# Patient Record
Sex: FEMALE | Race: WHITE | NOT HISPANIC OR LATINO | Employment: OTHER
[De-identification: names, ages, dates, MRNs, and addresses within clinical notes are randomized per-mention and may not be internally consistent; named-entity substitution may affect disease eponyms.]

---

## 2017-08-31 ENCOUNTER — RECORDS - HEALTHEAST (OUTPATIENT)
Dept: ADMINISTRATIVE | Facility: OTHER | Age: 72
End: 2017-08-31

## 2017-09-08 ENCOUNTER — RECORDS - HEALTHEAST (OUTPATIENT)
Dept: ADMINISTRATIVE | Facility: OTHER | Age: 72
End: 2017-09-08

## 2017-10-02 ENCOUNTER — HOSPITAL ENCOUNTER (OUTPATIENT)
Dept: MAMMOGRAPHY | Facility: HOSPITAL | Age: 72
Discharge: HOME | End: 2017-10-02
Attending: INTERNAL MEDICINE

## 2017-10-02 ENCOUNTER — COMMUNICATION - HEALTHEAST (OUTPATIENT)
Dept: TELEHEALTH | Facility: CLINIC | Age: 72
End: 2017-10-02

## 2017-10-02 ENCOUNTER — RECORDS - HEALTHEAST (OUTPATIENT)
Dept: BONE DENSITY | Facility: CLINIC | Age: 72
End: 2017-10-02

## 2017-10-02 ENCOUNTER — RECORDS - HEALTHEAST (OUTPATIENT)
Dept: ADMINISTRATIVE | Facility: OTHER | Age: 72
End: 2017-10-02

## 2017-10-02 DIAGNOSIS — Z78.0 ASYMPTOMATIC MENOPAUSAL STATE: ICD-10-CM

## 2017-10-02 DIAGNOSIS — Z12.31 VISIT FOR SCREENING MAMMOGRAM: ICD-10-CM

## 2017-10-02 DIAGNOSIS — C50.919 MALIGNANT NEOPLASM OF UNSPECIFIED SITE OF UNSPECIFIED FEMALE BREAST (H): ICD-10-CM

## 2018-10-30 ENCOUNTER — HOSPITAL ENCOUNTER (OUTPATIENT)
Dept: MAMMOGRAPHY | Facility: CLINIC | Age: 73
Discharge: HOME | End: 2018-10-30
Attending: INTERNAL MEDICINE

## 2018-10-30 DIAGNOSIS — Z12.31 VISIT FOR SCREENING MAMMOGRAM: ICD-10-CM

## 2019-09-09 ENCOUNTER — COMMUNICATION - HEALTHEAST (OUTPATIENT)
Dept: PHARMACY | Facility: CLINIC | Age: 74
End: 2019-09-09

## 2019-11-05 ENCOUNTER — HOSPITAL ENCOUNTER (OUTPATIENT)
Dept: MAMMOGRAPHY | Facility: CLINIC | Age: 74
Discharge: HOME | End: 2019-11-05
Attending: INTERNAL MEDICINE

## 2019-11-05 ENCOUNTER — COMMUNICATION - HEALTHEAST (OUTPATIENT)
Dept: TELEHEALTH | Facility: CLINIC | Age: 74
End: 2019-11-05

## 2019-11-05 DIAGNOSIS — Z12.31 VISIT FOR SCREENING MAMMOGRAM: ICD-10-CM

## 2020-09-13 ENCOUNTER — COMMUNICATION - HEALTHEAST (OUTPATIENT)
Dept: SCHEDULING | Facility: CLINIC | Age: 75
End: 2020-09-13

## 2020-09-14 ENCOUNTER — SURGERY - HEALTHEAST (OUTPATIENT)
Dept: CARDIOLOGY | Facility: CLINIC | Age: 75
End: 2020-09-14

## 2020-09-14 ASSESSMENT — MIFFLIN-ST. JEOR: SCORE: 1258.81

## 2020-09-15 ENCOUNTER — COMMUNICATION - HEALTHEAST (OUTPATIENT)
Dept: CARDIOLOGY | Facility: CLINIC | Age: 75
End: 2020-09-15

## 2020-09-15 ASSESSMENT — MIFFLIN-ST. JEOR: SCORE: 1260.17

## 2020-09-16 ENCOUNTER — AMBULATORY - HEALTHEAST (OUTPATIENT)
Dept: CARDIOLOGY | Facility: CLINIC | Age: 75
End: 2020-09-16

## 2020-09-16 DIAGNOSIS — I48.0 PAF (PAROXYSMAL ATRIAL FIBRILLATION) (H): ICD-10-CM

## 2020-09-16 ASSESSMENT — MIFFLIN-ST. JEOR: SCORE: 1257.44

## 2020-09-17 LAB
ATRIAL RATE - MUSE: 71 BPM
DIASTOLIC BLOOD PRESSURE - MUSE: NORMAL
INTERPRETATION ECG - MUSE: NORMAL
P AXIS - MUSE: 88 DEGREES
PR INTERVAL - MUSE: 160 MS
QRS DURATION - MUSE: 86 MS
QT - MUSE: 410 MS
QTC - MUSE: 445 MS
R AXIS - MUSE: 69 DEGREES
SYSTOLIC BLOOD PRESSURE - MUSE: NORMAL
T AXIS - MUSE: 45 DEGREES
VENTRICULAR RATE- MUSE: 71 BPM

## 2020-09-21 ENCOUNTER — AMBULATORY - HEALTHEAST (OUTPATIENT)
Dept: CARDIOLOGY | Facility: CLINIC | Age: 75
End: 2020-09-21

## 2020-09-21 DIAGNOSIS — I48.0 PAF (PAROXYSMAL ATRIAL FIBRILLATION) (H): ICD-10-CM

## 2020-09-21 DIAGNOSIS — Z95.0 CARDIAC PACEMAKER IN SITU: ICD-10-CM

## 2020-09-22 ENCOUNTER — OFFICE VISIT - HEALTHEAST (OUTPATIENT)
Dept: CARDIOLOGY | Facility: CLINIC | Age: 75
End: 2020-09-22

## 2020-09-22 DIAGNOSIS — I48.0 PAF (PAROXYSMAL ATRIAL FIBRILLATION) (H): ICD-10-CM

## 2020-09-22 DIAGNOSIS — Z95.0 CARDIAC PACEMAKER IN SITU: ICD-10-CM

## 2020-09-24 LAB
HCC DEVICE COMMENTS: NORMAL
HCC DEVICE IMPLANTING PROVIDER: NORMAL
HCC DEVICE MANUFACTURE: NORMAL
HCC DEVICE MODEL: NORMAL
HCC DEVICE SERIAL NUMBER: NORMAL
HCC DEVICE TYPE: NORMAL

## 2020-10-07 ENCOUNTER — AMBULATORY - HEALTHEAST (OUTPATIENT)
Dept: CARDIOLOGY | Facility: CLINIC | Age: 75
End: 2020-10-07

## 2020-10-07 DIAGNOSIS — I48.0 PAF (PAROXYSMAL ATRIAL FIBRILLATION) (H): ICD-10-CM

## 2020-10-07 DIAGNOSIS — Z95.0 CARDIAC PACEMAKER IN SITU: ICD-10-CM

## 2020-10-08 ENCOUNTER — OFFICE VISIT - HEALTHEAST (OUTPATIENT)
Dept: CARDIOLOGY | Facility: CLINIC | Age: 75
End: 2020-10-08

## 2020-10-08 DIAGNOSIS — I10 BENIGN ESSENTIAL HYPERTENSION: ICD-10-CM

## 2020-10-08 DIAGNOSIS — Z95.0 CARDIAC PACEMAKER IN SITU: ICD-10-CM

## 2020-10-08 DIAGNOSIS — R09.89 LABILE HYPERTENSION: ICD-10-CM

## 2020-10-08 DIAGNOSIS — I48.0 PAF (PAROXYSMAL ATRIAL FIBRILLATION) (H): ICD-10-CM

## 2020-10-08 RX ORDER — SOTALOL HYDROCHLORIDE 80 MG/1
80 TABLET ORAL EVERY 12 HOURS
Qty: 180 TABLET | Refills: 3 | Status: SHIPPED | OUTPATIENT
Start: 2020-10-08 | End: 2021-09-27

## 2020-10-08 RX ORDER — LISINOPRIL 2.5 MG/1
2.5 TABLET ORAL 2 TIMES DAILY
Qty: 180 TABLET | Refills: 3 | Status: SHIPPED | OUTPATIENT
Start: 2020-10-08 | End: 2021-12-23

## 2020-10-21 ENCOUNTER — COMMUNICATION - HEALTHEAST (OUTPATIENT)
Dept: CARDIOLOGY | Facility: CLINIC | Age: 75
End: 2020-10-21

## 2020-10-26 ENCOUNTER — COMMUNICATION - HEALTHEAST (OUTPATIENT)
Dept: CARDIOLOGY | Facility: CLINIC | Age: 75
End: 2020-10-26

## 2020-11-04 ENCOUNTER — COMMUNICATION - HEALTHEAST (OUTPATIENT)
Dept: CARDIOLOGY | Facility: CLINIC | Age: 75
End: 2020-11-04

## 2020-12-15 ENCOUNTER — AMBULATORY - HEALTHEAST (OUTPATIENT)
Dept: CARDIOLOGY | Facility: CLINIC | Age: 75
End: 2020-12-15

## 2020-12-15 DIAGNOSIS — Z95.0 CARDIAC PACEMAKER IN SITU: ICD-10-CM

## 2020-12-15 DIAGNOSIS — I49.5 SICK SINUS SYNDROME (H): ICD-10-CM

## 2020-12-28 ENCOUNTER — COMMUNICATION - HEALTHEAST (OUTPATIENT)
Dept: CARDIOLOGY | Facility: CLINIC | Age: 75
End: 2020-12-28

## 2020-12-28 DIAGNOSIS — E78.5 HYPERLIPIDEMIA: ICD-10-CM

## 2021-03-15 ENCOUNTER — AMBULATORY - HEALTHEAST (OUTPATIENT)
Dept: CARDIOLOGY | Facility: CLINIC | Age: 76
End: 2021-03-15

## 2021-03-15 DIAGNOSIS — I49.5 SICK SINUS SYNDROME (H): ICD-10-CM

## 2021-03-15 DIAGNOSIS — Z95.0 CARDIAC PACEMAKER IN SITU: ICD-10-CM

## 2021-04-19 ENCOUNTER — RECORDS - HEALTHEAST (OUTPATIENT)
Dept: ADMINISTRATIVE | Facility: OTHER | Age: 76
End: 2021-04-19

## 2021-04-22 ENCOUNTER — AMBULATORY - HEALTHEAST (OUTPATIENT)
Dept: CARDIOLOGY | Facility: CLINIC | Age: 76
End: 2021-04-22

## 2021-04-22 DIAGNOSIS — Z95.0 CARDIAC PACEMAKER IN SITU: ICD-10-CM

## 2021-04-22 DIAGNOSIS — I48.0 PAF (PAROXYSMAL ATRIAL FIBRILLATION) (H): ICD-10-CM

## 2021-04-22 DIAGNOSIS — I49.5 SICK SINUS SYNDROME (H): ICD-10-CM

## 2021-04-22 DIAGNOSIS — I10 BENIGN ESSENTIAL HYPERTENSION: ICD-10-CM

## 2021-04-22 ASSESSMENT — MIFFLIN-ST. JEOR: SCORE: 1285.57

## 2021-06-01 ENCOUNTER — RECORDS - HEALTHEAST (OUTPATIENT)
Dept: ADMINISTRATIVE | Facility: CLINIC | Age: 76
End: 2021-06-01

## 2021-06-05 ENCOUNTER — RECORDS - HEALTHEAST (OUTPATIENT)
Dept: SCHEDULING | Facility: CLINIC | Age: 76
End: 2021-06-05

## 2021-06-05 VITALS
HEIGHT: 64 IN | SYSTOLIC BLOOD PRESSURE: 120 MMHG | RESPIRATION RATE: 16 BRPM | HEART RATE: 64 BPM | WEIGHT: 179.8 LBS | DIASTOLIC BLOOD PRESSURE: 82 MMHG | BODY MASS INDEX: 30.7 KG/M2

## 2021-06-05 VITALS — WEIGHT: 174.2 LBS | BODY MASS INDEX: 29.74 KG/M2 | HEIGHT: 64 IN

## 2021-06-05 VITALS
HEART RATE: 81 BPM | RESPIRATION RATE: 18 BRPM | OXYGEN SATURATION: 98 % | BODY MASS INDEX: 30.21 KG/M2 | WEIGHT: 176 LBS | SYSTOLIC BLOOD PRESSURE: 120 MMHG | DIASTOLIC BLOOD PRESSURE: 66 MMHG

## 2021-06-05 VITALS
BODY MASS INDEX: 29.64 KG/M2 | WEIGHT: 173.6 LBS | HEART RATE: 70 BPM | SYSTOLIC BLOOD PRESSURE: 92 MMHG | DIASTOLIC BLOOD PRESSURE: 60 MMHG | HEIGHT: 64 IN | RESPIRATION RATE: 16 BRPM

## 2021-06-08 ENCOUNTER — HOSPITAL ENCOUNTER (OUTPATIENT)
Dept: MAMMOGRAPHY | Facility: CLINIC | Age: 76
Discharge: HOME | End: 2021-06-08
Attending: INTERNAL MEDICINE

## 2021-06-08 DIAGNOSIS — Z12.31 VISIT FOR SCREENING MAMMOGRAM: ICD-10-CM

## 2021-06-11 NOTE — PROGRESS NOTES
Pt comes in to clinic for ekg evaluation after starting Sotalol 80 mg bid during her admission to [facility].    EKG shows paced rhythm @ 71,  QTc 445.    Pt states she is tolerating this medication well.  She is feeling well, no dizziness or lightheadedness, no shortness of breath or chest pain.  She has a few medication questions and these are all answered.  She is scheduled for a follow up with the Device clinic on 9-22-20.  Reviewed contact information.

## 2021-06-11 NOTE — PROGRESS NOTES
DEVICE CLINIC RN POST-OP NOTE    Reason for visit: 1 week post op pacemaker wound and wound check.      Device: Medtronic [id number] [place] XT DR MRI  Procedure date: 9/14/2020  Implant Diagnosis: Sick sinus syndrome, Paroxysmal atrial fibrillation  Implanting Physician: Dr. Cl Massey      Assessment  Subjective: Pt is using tylenol and ice for pain relief. Pain is more prevalent at night.     Incision/device pocket: The steri-strips were removed from the incision. The incision is clean, dry and intact. There are no signs of infection present. The incision is well healed.  There is moderate ecchymosis that is resolving.     Device Findings  Interrogation of device reveals normal sensing and capture thresholds  See the Paceart Report for a full summary of the device information.        Patient Education    [facility]'s Partnership Agreement for Device Patients and Post-operative Checklist were presented and reviewed with patient at today's appointment.    Signs and symptoms of infection, poor wound healing, and device function were reviewed. Range of motion and weight restrictions for the RIGHT side are for four weeks. She was issued a Carelink remote monitor and instructed on its set-up and use for remote monitoring by the [employer] representative prior to hospital discharge. These instructions were reviewed with the patient at today s visit.       Plan  In clinic 1 month on 10/8/2020 in tandem with Mario Alberto Styles RN CNP.    In clinic device check in 3 months on 12/15/2020        Jolanta De La Cruz RN BSN PHN  Device Nurse   [facility]

## 2021-06-11 NOTE — PATIENT INSTRUCTIONS - HE
Pacemaker Post-operative Checklist      The Device Registered Nurse (RN) reviewed the pacemaker function.      The Device RN did a wound assessment and wound care teaching.    Please call the Device Nurses with any signs of infection or questions regarding wound healing. Device Nurse Line: [phone], Option #3      The Device RN demonstrated and displayed the specific remote monitoring system for your pacemaker.      The Device RN reviewed the Partnership Agreement Form.    Patient Instructions    Do not lift your right arm above the shoulder height, perform any vigorous arm movements such as swimming, golfing, washing windows, shoveling show, vacuuming or lifting greater than 10-15lbs with the affected arm for four weeks from the date of implant.  The last day of restrictions is 10/12/2020.    To reduce the risk of infection, try to avoid any dental procedures for the first 6 weeks after your pacemaker implant. If you have an emergent or urgent dental need during this time, contact the device clinic for a prescription for an antibiotic.       You will receive a device identification (ID) card in the mail from the device  within 6 weeks to replace the temporary ID card you were given in the hospital.      You may travel by any mode of transportation; just show your pacemaker ID card. You may be subject to a hand search or use of a handheld wand, but official should not keep the wand over the implant site for greater than 5-10 seconds.      For any surgery, let your doctor know you have a pacemaker.       Your pacemaker is MRI safe       Most household appliances, including a microwave, will not interfere with your pacemaker function. If you suspect interference, simply move away from the source. Cell phones do not cause a problem. Please refer to the device booklet from the  or their website under the section on electromagnetic interference (GREG) for further guidelines on things that may  interfere with your pacemaker.       Device Clinic Contact Information  Device Nurses: [phone], Option #3    Device Remote Specialists: [phone], Option #2. For questions about your Remote Transmission or Transmission Schedule  Device Schedulers: [phone], Option #1

## 2021-06-11 NOTE — TELEPHONE ENCOUNTER
Dr. Massey called EP RN line after implanting pts dual PPM.  Pt was started on Sotolol yesterday, per DND she will need EKG scheduled for tomorrow to check QTc.    Dr. Massey also requests that pt be scheduled to see Mario Alberto Styles NP in Device Clinic in 3 weeks for post op.    Will forward over to Katie to get these appointments scheduled.    Katie- let me know if you have any questions! DND said he ordered the EKG already.    Thank you!    Erika

## 2021-06-12 NOTE — PROGRESS NOTES
Review of systems done with patient over the phone. positive for daytime sleepiness and dizziness. Her medications were wrong and she changed them back and she feels better.

## 2021-06-12 NOTE — PATIENT INSTRUCTIONS - HE
Abiola Hallman,    It was a pleasure to see you today at the [facility].     My recommendations after this visit include:    Please call if symptoms of atrial fibrillation frequent.      To followup with Dr. Massey in 6 months with device check before visit..      My contact information:  Mario Alberto Styles CNP  After Hours or Scheduling  [phone]  My Nurse---Cary Zhang [phone]

## 2021-06-12 NOTE — TELEPHONE ENCOUNTER
Abiola takes hydrochlorothiazide in the morning.  She is convinced that this is making her get up frequently at night to go the bathroom.  Bladder infection has been ruled out in primary clinic.  I discussed that hctz does not work that long.  There is no other medication that is likely causing urinary frequency at night and with bladder infection ruled out, she will do a trial off of hydrochlorothiazide to see if this makes a difference.  To call me next week after she is off the hydrochlorothiazide for about a week and let me know if this has resolved nocturia.  She should take her blood pressure once or twice a day every other day and to report readings when she calls in as she may need increase in lisinopril dose with if she stays off of hydrochlorothiazide.  She has been known to have labile blood pressure in the past.  She is agreeable to this plan.

## 2021-06-12 NOTE — TELEPHONE ENCOUNTER
"    Patient called with complaints of frequent urination through the night. States she is up about 10 times a night and only getting 2-3 hours of sleep, wonders if the hydrochlorothiazide is causing this. States she spoke with Mario Alberto HAJI CNP last week about medications changes, now would like to talk to her  about this medication. States she takes it in the morning and limits her fluid intake after 5 pm, yet is still \"up all night.\"    Marivel Bautista RN    "

## 2021-06-12 NOTE — TELEPHONE ENCOUNTER
"Patient called back today, states she did go see PMD, they took a urine sample and \"ruled out a bladder infection so it is not that.\" States she is still convinced this is medication related and would like to speak to Mario Alberto HAJI CNP about her urinary frequency at night and medications.     Marivel Bautista RN    "

## 2021-06-12 NOTE — TELEPHONE ENCOUNTER
"Ms. Hallman is calling to speak with Mario Alberto Styles RN, CNP regarding how she is feeling \"after a medication change last week.\"  Ms. Styles's nurse is with another patient; therefore, I took a message and Cary Zhang LPN will call Ms. Hallman back.  I thanked her for her call. Routed to Cary Zhang for follow-up.  Oliva Llanos RN, MA  Device Nurse  [facility]    "

## 2021-06-16 PROBLEM — Z95.0 CARDIAC PACEMAKER IN SITU: Status: ACTIVE | Noted: 2020-09-21

## 2021-06-16 PROBLEM — I49.5 SICK SINUS SYNDROME (H): Status: ACTIVE | Noted: 2020-09-15

## 2021-06-16 PROBLEM — G45.8 ACUTE ANTERIOR CIRCULATION TIA: Chronic | Status: ACTIVE | Noted: 2019-09-05

## 2021-06-16 PROBLEM — I10 BENIGN ESSENTIAL HYPERTENSION: Chronic | Status: ACTIVE | Noted: 2019-09-06

## 2021-06-16 PROBLEM — T50.905A: Chronic | Status: ACTIVE | Noted: 2019-09-08

## 2021-06-16 NOTE — PATIENT INSTRUCTIONS - HE
Abiola Hallman    It was a pleasure to see you today for a clinic visit.    You are doing well without further atrial fibrillation  Follow up appointment: Mario Alberto Styles CNP in 1 year    Contact EP Nurse Clinicians at [phone] with questions or concerns.    Cl Massey MD

## 2021-06-17 NOTE — TELEPHONE ENCOUNTER
Telephone Encounter by Marivel Bautista RN at 10/21/2020  1:02 PM     Author: Marivel Bautista RN Service: -- Author Type: Registered Nurse    Filed: 10/21/2020  1:10 PM Encounter Date: 10/21/2020 Status: Signed    : Marivel Bautista RN (Registered Nurse)       Meryl Styles CNP Gebel, Mandy L, RN   Caller: Unspecified (Today, 12:23 PM)             To call primary care provider as needs to have bladder infection ruled out.  Not caused by hydrochlorothiazide.   Mario Alberto      Above noted and reviewed with patient, agrees to see PMD.   Marivel Bautista RN

## 2021-06-17 NOTE — PROGRESS NOTES
In clinic device check with Dr. Massey.  Please see link for full device report.  Patient was informed of results and next follow up during today's visit.

## 2021-06-20 NOTE — LETTER
Letter by Elba Osborn RDCS at      Author: Elba Osborn RDCS Service: -- Author Type: --    Filed:  Encounter Date: 9/21/2020 Status: (Other)         Abiola Hallman  [address] [place] [address]      September 21, 2020      Dear Ms. Hallman,    RE: Remote Results    We are writing to you regarding your recent Remote Pacemaker check from home. Your transmission was received successfully. Battery status is satisfactory at this time.     Your results are showing no significant changes.    Your next device appointment will be a clinic visit on October 8, 2020 at 8:20 at our  [place] location, [address] [place] [address].    To schedule or reschedule, please call [phone] and press 1.    NOTE: If you would like to do an extra transmission, please call [phone] and press 3 to speak to a nurse BEFORE transmitting. This ensures that the Device Clinic staff is aware of the reason you are sending a transmission, and can follow-up with you after it has been reviewed.    We will be checking your implanted device from home (remotely) every three months unless otherwise instructed. We will need to see you in the clinic at least once a year. You may need to be seen in the clinic sooner depending on the results of your check.    Please be aware:    The follow-up schedule is like a Physician prescription.    Your remote monitor is paired to your specific implanted device.      Sincerely,    [facility]

## 2021-06-20 NOTE — LETTER
Letter by Adeline Rodriguez at      Author: Adeline Rodriguez Service: -- Author Type: --    Filed:  Encounter Date: 10/7/2020 Status: (Other)         Abiola Hallman  [address] [place] [address]      October 7, 2020      Dear Ms. Hallman,    RE: Remote Results    We are writing to you regarding your recent Remote Pacemaker check from home. Your transmission was received successfully. Battery status is satisfactory at this time.     Your results are within normal limits.    Your next device appointment will be a clinic visit on December 15, 2020 at 9:20am at our  [facility] location, [address] [place] [address].    To schedule or reschedule, please call [phone] and press 1.    NOTE: If you would like to do an extra transmission, please call [phone] and press 3 to speak to a nurse BEFORE transmitting. This ensures that the Device Clinic staff is aware of the reason you are sending a transmission, and can follow-up with you after it has been reviewed.    We will be checking your implanted device from home (remotely) every three months unless otherwise instructed. We will need to see you in the clinic at least once a year. You may need to be seen in the clinic sooner depending on the results of your check.    Please be aware:    The follow-up schedule is like a Physician prescription.    Your remote monitor is paired to your specific implanted device.      Sincerely,    [facility] Clinic

## 2021-06-23 ENCOUNTER — COMMUNICATION - HEALTHEAST (OUTPATIENT)
Dept: CARDIOLOGY | Facility: CLINIC | Age: 76
End: 2021-06-23

## 2021-06-26 NOTE — TELEPHONE ENCOUNTER
"Mario Alberto-  Pt is reaching out wondering and wondering if it was ok to take Prednisone PO and polymyxin sulfate eye drops for 7-10 days s/p eye procedure she had  \"I take a lot of medication and am very concerned it is ok to take with all my other medications\"  Please advise  Thank you,  So  "

## 2021-06-26 NOTE — TELEPHONE ENCOUNTER
Attempted to contact pt, detailed message discussing the below with contact information was left per pt request.  6/24/2021 9:31 AM  Little Medrano RN

## 2021-06-29 NOTE — PROGRESS NOTES
"Progress Notes by Meryl Styles CNP at 10/8/2020  9:10 AM     Author: Meryl Styles CNP Service: -- Author Type: Nurse Practitioner    Filed: 10/8/2020 12:17 PM Encounter Date: 10/8/2020 Status: Signed    : Meryl Styles CNP (Nurse Practitioner)            The patient has been notified of following:     \"This telephone visit will be conducted via a call between you and your physician/provider. We have found that certain health care needs can be provided without the need for a physical exam.  This service lets us provide the care you need with a phone conversation.  If a prescription is necessary we can send it directly to your pharmacy.  If lab work is needed we can place an order for that and you can then stop by our lab to have the test done at a later time. If during the course of the call the physician/provider feels a telephone visit is not appropriate, you will not be charged for this service.\"         HEART CARE PHONE ENCOUNTER        Appointment is being conducted as a telephone visit, to reduce risk of exposure given the current status of Coronovirus in our community. This telephone visit is being conducted via a call between the patient and physician/provider. Health care needs are being provided without a physical exam.     Assessment/Recommendations   Assessment & PLAN:     Diagnoses and all orders for this visit:    PAF (paroxysmal atrial fibrillation) (H) and unclear if she is symptomatic with atrial fib.  No side effects on sotalol.  Therefore I will continue antiarrhythmic for now.  -     sotaloL (BETAPACE) 80 MG tablet; Take 1 tablet (80 mg total) by mouth every 12 (twelve) hours.  Dispense: 180 tablet; Refill: 3    Labile hypertension  -     Discontinue: lisinopriL (PRINIVIL,ZESTRIL) 2.5 MG tablet; Take 1 tablet (2.5 mg total) by mouth 2 (two) times a day.  Dispense: 180 tablet; Refill: 3  -     lisinopriL (PRINIVIL,ZESTRIL) 2.5 MG tablet; Take 1 tablet (2.5 mg total) " by mouth 2 (two) times a day.  Dispense: 180 tablet; Refill: 3    Cardiac pacemaker in situ and device functioning appropriately.    Benign essential hypertension and no BP for evaluation due to phone visit.  BP meds refilled.  To continue lisinopril 2.5 mg p.o. twice daily.        [id number] score of 6 and on Eliquis which is affordable for her.  Discussed that it will go generic within the next year.  Follow up in clinic with Dr. Massey in 6 months with device check prior to visit.      Total time of call between patient and provider was 22 minutes .  Start [id number] and end time 0942.       History of Present Illness/Subjective    Abiola Hallman is a 74 y.o. female who is being evaluated via a billable telephone visit.    Abiola has a long history of sinus bradycardia and sick sinus syndrome and sinus node dysfunction and got a permanent pacemaker on 9/14/2020 with Dr. Massey.  She denies any complications post pacemaker.  She feels better since her permanent pacemaker but reports that she had significant fatigue for the first 2 weeks until she switched taking her lisinopril from 5 mg in the morning to 2.5 mg p.o. twice daily.  Overnight fatigue resolved.  I discussed that this is not a usual side effect of lisinopril as a good 24-hour medication.  Abiola's medical history is also significant for hypertension, paroxysmal atrial fib and breast cancer with left mastectomy.  The device was inserted on the right side.  She reports that it is well-healed.  Her medical history is also significant for a TIA and  had expressive aphasia for about 10 minutes about a year ago.  It is unclear to me if she knows when she is in atrial fib.  She has had no significant episodes since permanent pacemaker placement.  She denies any cardiac symptoms on questioning.  She is quite talkative.    I have reviewed and updated the patient's Past Medical History, Social History, Family History and Medication List.   Cardiographics reviewed and  prep for this visit are as follows:  Remote device check from yesterday shows 90% atrial pacing and less than 1% RV pacing.  No arrhythmias seen.  Leads stable and battery okay.  Physical Examination not perform given phone encounter Review of Systems                                                Medical History  Surgical History Family History Social History   Past Medical History:   Diagnosis Date   ? Breast cancer (H) 2007   ? Hypertension    ? Stroke (H)     Past Surgical History:   Procedure Laterality Date   ? ABDOMINAL SURGERY     ? BACK SURGERY     ? BREAST BIOPSY Right 2008 2011, 2012   ? BREAST BIOPSY Left 2007   ? BREAST LUMPECTOMY Left 2007   ? EP PACEMAKER INSERT N/A 9/14/2020    Procedure: EP Pacemaker Insertion;  Surgeon: Cl Massey MD;  Location: [facility] Lab;  Service: Cardiology   ? MASTECTOMY Left 2007    Family History   Problem Relation Age of Onset   ? Breast cancer Mother 80    Social History     Socioeconomic History   ? Marital status:      Spouse name: Not on file   ? Number of children: Not on file   ? Years of education: Not on file   ? Highest education level: Not on file   Occupational History   ? Not on file   Social Needs   ? Financial resource strain: Not on file   ? Food insecurity     Worry: Not on file     Inability: Not on file   ? Transportation needs     Medical: Not on file     Non-medical: Not on file   Tobacco Use   ? Smoking status: Never Smoker   ? Smokeless tobacco: Never Used   Substance and Sexual Activity   ? Alcohol use: Never     Frequency: Never   ? Drug use: Never   ? Sexual activity: Not on file   Lifestyle   ? Physical activity     Days per week: Not on file     Minutes per session: Not on file   ? Stress: Not on file   Relationships   ? Social connections     Talks on phone: Not on file     Gets together: Not on file     Attends Rastafarian service: Not on file     Active member of club or organization: Not on file     Attends meetings of  clubs or organizations: Not on file     Relationship status: Not on file   ? Intimate partner violence     Fear of current or ex partner: Not on file     Emotionally abused: Not on file     Physically abused: Not on file     Forced sexual activity: Not on file   Other Topics Concern   ? Not on file   Social History Narrative   ? Not on file          Medications  Allergies   Current Outpatient Medications   Medication Sig Dispense Refill   ? acetaminophen (TYLENOL) 325 MG tablet Take 1 tablet (325 mg total) by mouth every 4 (four) hours as needed.  0   ? apixaban ANTICOAGULANT (ELIQUIS) 5 mg Tab tablet Take 5 mg by mouth 2 (two) times a day.     ? aspirin 81 MG EC tablet Take 1 tablet (81 mg total) by mouth daily.  0   ? atorvastatin (LIPITOR) 10 MG tablet Take 10 mg by mouth at bedtime.     ? famotidine (PEPCID) 20 MG tablet Take 1 tablet (20 mg total) by mouth daily. (for stomach acid)  0   ? hydroCHLOROthiazide (MICROZIDE) 12.5 mg capsule Take 1 capsule (12.5 mg total) by mouth daily. (mild water pill for blood pressure) 30 capsule 0   ? lisinopriL (PRINIVIL,ZESTRIL) 2.5 MG tablet Take 1 tablet (2.5 mg total) by mouth 2 (two) times a day. 180 tablet 3   ? multivitamin Chew Chew 2 tablets daily.      ? sertraline (ZOLOFT) 25 MG tablet Take 25 mg by mouth daily.     ? sotaloL (BETAPACE) 80 MG tablet Take 1 tablet (80 mg total) by mouth every 12 (twelve) hours. 180 tablet 3     No current facility-administered medications for this visit.     Allergies   Allergen Reactions   ? Essential Oils Shortness Of Breath, Cough and Headache   ? Hydralazine Nausea And Vomiting and Headache     Possibly. 12 hrs of Nausea and headache after one 10 mg dose   ? Adhesive Tape-Silicones      Bandaids   ? Codeine    ? Darvon [Propoxyphene]    ? Latex    ? Monistat 1 (Tioconazole) [Tioconazole]    ? Neurontin [Gabapentin]    ? Oxycodone    ? Paxil [Paroxetine Hcl]    ? Vicodin [Hydrocodone-Acetaminophen]    ? Citalopram Anxiety   ?  Paroxetine Anxiety   ? Sertraline Anxiety     States the generic causes anxiety, is able to take Zoloft band name         Lab Results    Chemistry/lipid CBC Cardiac Enzymes/BNP/TSH/INR   Lab Results   Component Value Date    CHOL 171 09/06/2019    HDL 45 (L) 09/06/2019    LDLCALC 96 09/06/2019    TRIG 149 09/06/2019    CREATININE 0.84 09/12/2020    BUN 13 09/12/2020    K 3.7 09/12/2020     09/12/2020     09/12/2020    CO2 26 09/12/2020    Lab Results   Component Value Date    WBC 8.6 09/12/2020    HGB 12.5 09/12/2020    HCT 37.4 09/12/2020    MCV 90 09/12/2020     09/12/2020    Lab Results   Component Value Date    TROPONINI <0.01 09/11/2020    TSH 2.72 09/10/2020    INR 1.05 09/10/2020        Meryl Styles

## 2021-06-30 NOTE — PROGRESS NOTES
Progress Notes by Cl Massey MD at 4/22/2021  2:50 PM     Author: Cl Massey MD Service: -- Author Type: Physician    Filed: 4/22/2021  3:16 PM Encounter Date: 4/22/2021 Status: Signed    : Cl Massey MD (Physician)                ELECTROPHYSIOLOGY NOTE    Today I had the opportunity to see  Abiola Hallman for follow-up evaluation of tachycardia-bradycardia syndrome.      Assessment/Recommendations   Clinic Problem List:  No diagnosis found.    30 minutes spent on this encounter date for chart review, history, physical exam,documentation and activities detailed below.    Assessment:    Paroxysmal atrial fibrillation, completely suppressed with sotalol 80 mg twice daily, would favor continuing Eliquis however with past history of TIA and The patient's [id number] Score for stroke risk is 6  Sick sinus syndrome, normally functioning dual-chamber pacemaker.    Plan:   Continue current medications  Follow up appointment: Mario Alberto Styles CNP in 1 year short     History of Present Illness     Abiola Hallman is a 75 y.o. female presented with atrial fibrillation with rapid ventricular response on 9/10/2020.  She was started on metoprolol 25 mg daily and several days later returned with symptomatic bradycardia with heart rates of 40 bpm. A dual-chamber permanent pacemaker was placed 9/14/2020.  She was subsequently started on sotalol and remained on Eliquis.  Twelve-lead ECG 9/16/2020 showed an atrial paced rhythm rate 70 bpm intact AV conduction and normal QT interval.  Postoperative device checks in October December and March all showed no evidence for atrial fibrillation..  She has felt quite well since permanent pacemaker placement.  She denies palpitations rapid heart beating or excessive bleeding on Eliquis.  She has resumed her activity without limitations  Personally reviewed.  Pacemaker check today shows that she is 90% atrial paced with seldom ventricularly paced rhythm.  There is been no  atrial fibrillation.  Atrial and ventricular pacing and sensing thresholds are excellent.  Estimated battery longevity is 13 years.       Physical Examination Review of Systems   Vitals:    04/22/21 1416   BP: 120/82   Pulse: 64   Resp: 16     Body mass index is 30.86 kg/m .  Wt Readings from Last 3 Encounters:   04/22/21 179 lb 12.8 oz (81.6 kg)   12/15/20 176 lb (79.8 kg)   09/16/20 173 lb 9.6 oz (78.7 kg)        Appearance:   no distress,    HEENT:   no scleral icterus, normal conjunctivae    Neck: no carotid bruits or thyromegaly   Chest/Lungs:   lungs are clear to auscultation, no rales or wheezing, permanent pacemaker well-healed in the left subclavian pocket   Cardiovascular:   Jugular venous pressure 4 cm, Apical pulse is quite regular. Normal S1,S2 with no murmurs or gallops,   Abdomen:  no  Hepatosplenomegaly., nontender,  bowel sounds are present   Extremities: no cyanosis or clubbing, No edema   Skin: no xanthelasma, warm.    Neurologic: No gross focal neurologic deficits   Mood/Affect: Alert, cooperative    General: WNL  Eyes: WNL  Ears/Nose/Throat: WNL  Lungs: WNL  Heart: WNL  Stomach: WNL  Bladder: WNL  Muscle/Joints: WNL  Skin: WNL  Nervous System: WNL  Mental Health: WNL     Blood: WNL     Medical History  Surgical History Family History Social History   Past Medical History:   Diagnosis Date   ? Breast cancer (H) 2007   ? Hypertension    ? Stroke (H)     Past Surgical History:   Procedure Laterality Date   ? ABDOMINAL SURGERY     ? BACK SURGERY     ? BREAST BIOPSY Right 2008 2011, 2012   ? BREAST BIOPSY Left 2007   ? BREAST LUMPECTOMY Left 2007   ? EP PACEMAKER INSERT N/A 9/14/2020    Procedure: EP Pacemaker Insertion;  Surgeon: Cl Massey MD;  Location: [facility] Cath Lab;  Service: Cardiology   ? MASTECTOMY Left 2007    Family History   Problem Relation Age of Onset   ? Breast cancer Mother 80    Social History     Socioeconomic History   ? Marital status:      Spouse name: Not  on file   ? Number of children: Not on file   ? Years of education: Not on file   ? Highest education level: Not on file   Occupational History   ? Not on file   Social Needs   ? Financial resource strain: Not on file   ? Food insecurity     Worry: Not on file     Inability: Not on file   ? Transportation needs     Medical: Not on file     Non-medical: Not on file   Tobacco Use   ? Smoking status: Never Smoker   ? Smokeless tobacco: Never Used   Substance and Sexual Activity   ? Alcohol use: Never     Frequency: Never   ? Drug use: Never   ? Sexual activity: Not on file   Lifestyle   ? Physical activity     Days per week: Not on file     Minutes per session: Not on file   ? Stress: Not on file   Relationships   ? Social connections     Talks on phone: Not on file     Gets together: Not on file     Attends Yazidism service: Not on file     Active member of club or organization: Not on file     Attends meetings of clubs or organizations: Not on file     Relationship status: Not on file   ? Intimate partner violence     Fear of current or ex partner: Not on file     Emotionally abused: Not on file     Physically abused: Not on file     Forced sexual activity: Not on file   Other Topics Concern   ? Not on file   Social History Narrative   ? Not on file          Medications  Allergies   Current Outpatient Medications   Medication Sig Dispense Refill   ? acetaminophen (TYLENOL) 325 MG tablet Take 1 tablet (325 mg total) by mouth every 4 (four) hours as needed.  0   ? apixaban ANTICOAGULANT (ELIQUIS) 5 mg Tab tablet Take 1 tablet (5 mg total) by mouth 2 (two) times a day. 180 tablet 1   ? aspirin 81 MG EC tablet Take 1 tablet (81 mg total) by mouth daily.  0   ? atorvastatin (LIPITOR) 10 MG tablet Take 10 mg by mouth at bedtime.     ? famotidine (PEPCID) 20 MG tablet Take 1 tablet (20 mg total) by mouth daily. (for stomach acid)  0   ? lisinopriL (PRINIVIL,ZESTRIL) 2.5 MG tablet Take 1 tablet (2.5 mg total) by mouth 2  (two) times a day. 180 tablet 3   ? multivitamin Chew Chew 2 tablets daily.      ? sertraline (ZOLOFT) 25 MG tablet Take 25 mg by mouth daily.     ? sotaloL (BETAPACE) 80 MG tablet Take 1 tablet (80 mg total) by mouth every 12 (twelve) hours. 180 tablet 3     No current facility-administered medications for this visit.       Allergies   Allergen Reactions   ? Essential Oils Shortness Of Breath, Cough and Headache   ? Hydralazine Nausea And Vomiting and Headache     Possibly. 12 hrs of Nausea and headache after one 10 mg dose   ? Adhesive Tape-Silicones      Bandaids   ? Codeine    ? Darvon [Propoxyphene]    ? Latex    ? Monistat 1 (Tioconazole) [Tioconazole]    ? Neurontin [Gabapentin]    ? Oxycodone    ? Paxil [Paroxetine Hcl]    ? Vicodin [Hydrocodone-Acetaminophen]    ? Citalopram Anxiety   ? Paroxetine Anxiety

## 2021-07-03 NOTE — ADDENDUM NOTE
Addendum Note by Mreyl Vazquez CNP at 10/26/2020  4:14 PM     Author: Meryl Vazquez CNP Service: -- Author Type: Nurse Practitioner    Filed: 10/26/2020  4:14 PM Encounter Date: 10/21/2020 Status: Signed    : Meryl Vazquez CNP (Nurse Practitioner)    Addended by: MERYL VAZQUEZ on: 10/26/2020 04:14 PM        Modules accepted: Orders

## 2021-07-04 ENCOUNTER — HEALTH MAINTENANCE LETTER (OUTPATIENT)
Age: 76
End: 2021-07-04

## 2021-07-04 NOTE — TELEPHONE ENCOUNTER
Telephone Encounter by Little Medrano RN at 6/24/2021  8:00 AM     Author: Little Medrano RN Service: -- Author Type: Registered Nurse    Filed: 6/24/2021  8:01 AM Encounter Date: 6/23/2021 Status: Signed    : Little Medrano RN (Registered Nurse)       Attempted to contact pt X2 , unable to leave voicemail.   Phone picks up and then hangs up immediately.  6/24/2021 8:00 AM  CORA Malone Janet A., Little Pinedo RN   Caller: Unspecified (Yesterday,  1:37 PM)             Yes -no problem.   Mario Alberto

## 2021-07-14 PROBLEM — R00.1 SYMPTOMATIC BRADYCARDIA: Status: RESOLVED | Noted: 2020-09-12 | Resolved: 2020-10-08

## 2021-07-14 PROBLEM — R00.1 BRADYCARDIA: Status: RESOLVED | Noted: 2020-09-12 | Resolved: 2020-10-08

## 2021-07-14 PROBLEM — I49.5 SINUS NODE DYSFUNCTION (H): Status: RESOLVED | Noted: 2020-09-15 | Resolved: 2020-10-08

## 2021-07-23 ENCOUNTER — ANCILLARY PROCEDURE (OUTPATIENT)
Dept: CARDIOLOGY | Facility: CLINIC | Age: 76
End: 2021-07-23
Attending: INTERNAL MEDICINE
Payer: COMMERCIAL

## 2021-07-23 DIAGNOSIS — Z95.0 CARDIAC PACEMAKER IN SITU: ICD-10-CM

## 2021-07-23 LAB
MDC_IDC_EPISODE_DTM: NORMAL
MDC_IDC_EPISODE_DURATION: 0 S
MDC_IDC_EPISODE_ID: 1
MDC_IDC_EPISODE_TYPE: NORMAL
MDC_IDC_LEAD_IMPLANT_DT: NORMAL
MDC_IDC_LEAD_IMPLANT_DT: NORMAL
MDC_IDC_LEAD_LOCATION: NORMAL
MDC_IDC_LEAD_LOCATION: NORMAL
MDC_IDC_LEAD_LOCATION_DETAIL_1: NORMAL
MDC_IDC_LEAD_LOCATION_DETAIL_1: NORMAL
MDC_IDC_LEAD_MFG: NORMAL
MDC_IDC_LEAD_MFG: NORMAL
MDC_IDC_LEAD_MODEL: NORMAL
MDC_IDC_LEAD_MODEL: NORMAL
MDC_IDC_LEAD_POLARITY_TYPE: NORMAL
MDC_IDC_LEAD_POLARITY_TYPE: NORMAL
MDC_IDC_LEAD_SERIAL: NORMAL
MDC_IDC_LEAD_SERIAL: NORMAL
MDC_IDC_LEAD_SPECIAL_FUNCTION: NORMAL
MDC_IDC_LEAD_SPECIAL_FUNCTION: NORMAL
MDC_IDC_MSMT_BATTERY_DTM: NORMAL
MDC_IDC_MSMT_BATTERY_REMAINING_LONGEVITY: 154 MO
MDC_IDC_MSMT_BATTERY_RRT_TRIGGER: 2.62
MDC_IDC_MSMT_BATTERY_STATUS: NORMAL
MDC_IDC_MSMT_BATTERY_VOLTAGE: 3.09 V
MDC_IDC_MSMT_LEADCHNL_RA_IMPEDANCE_VALUE: 342 OHM
MDC_IDC_MSMT_LEADCHNL_RA_IMPEDANCE_VALUE: 513 OHM
MDC_IDC_MSMT_LEADCHNL_RA_PACING_THRESHOLD_AMPLITUDE: 0.62 V
MDC_IDC_MSMT_LEADCHNL_RA_PACING_THRESHOLD_PULSEWIDTH: 0.4 MS
MDC_IDC_MSMT_LEADCHNL_RA_SENSING_INTR_AMPL: 2.75 MV
MDC_IDC_MSMT_LEADCHNL_RA_SENSING_INTR_AMPL: 2.75 MV
MDC_IDC_MSMT_LEADCHNL_RV_IMPEDANCE_VALUE: 437 OHM
MDC_IDC_MSMT_LEADCHNL_RV_IMPEDANCE_VALUE: 589 OHM
MDC_IDC_MSMT_LEADCHNL_RV_PACING_THRESHOLD_AMPLITUDE: 0.62 V
MDC_IDC_MSMT_LEADCHNL_RV_PACING_THRESHOLD_PULSEWIDTH: 0.4 MS
MDC_IDC_MSMT_LEADCHNL_RV_SENSING_INTR_AMPL: 6.88 MV
MDC_IDC_MSMT_LEADCHNL_RV_SENSING_INTR_AMPL: 6.88 MV
MDC_IDC_PG_IMPLANT_DTM: NORMAL
MDC_IDC_PG_MFG: NORMAL
MDC_IDC_PG_MODEL: NORMAL
MDC_IDC_PG_SERIAL: NORMAL
MDC_IDC_PG_TYPE: NORMAL
MDC_IDC_SESS_CLINIC_NAME: NORMAL
MDC_IDC_SESS_DTM: NORMAL
MDC_IDC_SESS_TYPE: NORMAL
MDC_IDC_SET_BRADY_AT_MODE_SWITCH_RATE: 171 {BEATS}/MIN
MDC_IDC_SET_BRADY_HYSTRATE: NORMAL
MDC_IDC_SET_BRADY_LOWRATE: 70 {BEATS}/MIN
MDC_IDC_SET_BRADY_MAX_SENSOR_RATE: 130 {BEATS}/MIN
MDC_IDC_SET_BRADY_MAX_TRACKING_RATE: 130 {BEATS}/MIN
MDC_IDC_SET_BRADY_MODE: NORMAL
MDC_IDC_SET_BRADY_PAV_DELAY_LOW: 180 MS
MDC_IDC_SET_BRADY_SAV_DELAY_LOW: 150 MS
MDC_IDC_SET_LEADCHNL_RA_PACING_AMPLITUDE: 1.5 V
MDC_IDC_SET_LEADCHNL_RA_PACING_ANODE_ELECTRODE_1: NORMAL
MDC_IDC_SET_LEADCHNL_RA_PACING_ANODE_LOCATION_1: NORMAL
MDC_IDC_SET_LEADCHNL_RA_PACING_CAPTURE_MODE: NORMAL
MDC_IDC_SET_LEADCHNL_RA_PACING_CATHODE_ELECTRODE_1: NORMAL
MDC_IDC_SET_LEADCHNL_RA_PACING_CATHODE_LOCATION_1: NORMAL
MDC_IDC_SET_LEADCHNL_RA_PACING_POLARITY: NORMAL
MDC_IDC_SET_LEADCHNL_RA_PACING_PULSEWIDTH: 0.4 MS
MDC_IDC_SET_LEADCHNL_RA_SENSING_ANODE_ELECTRODE_1: NORMAL
MDC_IDC_SET_LEADCHNL_RA_SENSING_ANODE_LOCATION_1: NORMAL
MDC_IDC_SET_LEADCHNL_RA_SENSING_CATHODE_ELECTRODE_1: NORMAL
MDC_IDC_SET_LEADCHNL_RA_SENSING_CATHODE_LOCATION_1: NORMAL
MDC_IDC_SET_LEADCHNL_RA_SENSING_POLARITY: NORMAL
MDC_IDC_SET_LEADCHNL_RA_SENSING_SENSITIVITY: 0.45 MV
MDC_IDC_SET_LEADCHNL_RV_PACING_AMPLITUDE: 1.5 V
MDC_IDC_SET_LEADCHNL_RV_PACING_ANODE_ELECTRODE_1: NORMAL
MDC_IDC_SET_LEADCHNL_RV_PACING_ANODE_LOCATION_1: NORMAL
MDC_IDC_SET_LEADCHNL_RV_PACING_CAPTURE_MODE: NORMAL
MDC_IDC_SET_LEADCHNL_RV_PACING_CATHODE_ELECTRODE_1: NORMAL
MDC_IDC_SET_LEADCHNL_RV_PACING_CATHODE_LOCATION_1: NORMAL
MDC_IDC_SET_LEADCHNL_RV_PACING_POLARITY: NORMAL
MDC_IDC_SET_LEADCHNL_RV_PACING_PULSEWIDTH: 0.4 MS
MDC_IDC_SET_LEADCHNL_RV_SENSING_ANODE_ELECTRODE_1: NORMAL
MDC_IDC_SET_LEADCHNL_RV_SENSING_ANODE_LOCATION_1: NORMAL
MDC_IDC_SET_LEADCHNL_RV_SENSING_CATHODE_ELECTRODE_1: NORMAL
MDC_IDC_SET_LEADCHNL_RV_SENSING_CATHODE_LOCATION_1: NORMAL
MDC_IDC_SET_LEADCHNL_RV_SENSING_POLARITY: NORMAL
MDC_IDC_SET_LEADCHNL_RV_SENSING_SENSITIVITY: 0.9 MV
MDC_IDC_SET_ZONE_DETECTION_INTERVAL: 200 MS
MDC_IDC_SET_ZONE_DETECTION_INTERVAL: 350 MS
MDC_IDC_SET_ZONE_DETECTION_INTERVAL: 400 MS
MDC_IDC_SET_ZONE_TYPE: NORMAL
MDC_IDC_STAT_BRADY_AP_VP_PERCENT: 0.02 %
MDC_IDC_STAT_BRADY_AP_VS_PERCENT: 90.64 %
MDC_IDC_STAT_BRADY_AS_VP_PERCENT: 0 %
MDC_IDC_STAT_BRADY_AS_VS_PERCENT: 9.34 %
MDC_IDC_STAT_BRADY_DTM_END: NORMAL
MDC_IDC_STAT_BRADY_DTM_START: NORMAL
MDC_IDC_STAT_BRADY_RA_PERCENT_PACED: 89.63 %
MDC_IDC_STAT_BRADY_RV_PERCENT_PACED: 0.03 %
MDC_IDC_STAT_EPISODE_RECENT_COUNT: 0
MDC_IDC_STAT_EPISODE_RECENT_COUNT: 0
MDC_IDC_STAT_EPISODE_RECENT_COUNT: 1
MDC_IDC_STAT_EPISODE_RECENT_COUNT_DTM_END: NORMAL
MDC_IDC_STAT_EPISODE_RECENT_COUNT_DTM_START: NORMAL
MDC_IDC_STAT_EPISODE_TOTAL_COUNT: 0
MDC_IDC_STAT_EPISODE_TOTAL_COUNT: 0
MDC_IDC_STAT_EPISODE_TOTAL_COUNT: 1
MDC_IDC_STAT_EPISODE_TOTAL_COUNT_DTM_END: NORMAL
MDC_IDC_STAT_EPISODE_TOTAL_COUNT_DTM_START: NORMAL
MDC_IDC_STAT_EPISODE_TYPE: NORMAL

## 2021-07-23 PROCEDURE — 93294 REM INTERROG EVL PM/LDLS PM: CPT | Performed by: INTERNAL MEDICINE

## 2021-07-23 PROCEDURE — 93296 REM INTERROG EVL PM/IDS: CPT | Performed by: INTERNAL MEDICINE

## 2021-09-09 DIAGNOSIS — E78.5 HYPERLIPIDEMIA: Primary | ICD-10-CM

## 2021-09-09 RX ORDER — ATORVASTATIN CALCIUM 10 MG/1
10 TABLET, FILM COATED ORAL AT BEDTIME
Qty: 90 TABLET | Refills: 1 | Status: SHIPPED | OUTPATIENT
Start: 2021-09-09 | End: 2021-12-07

## 2021-09-27 DIAGNOSIS — I48.0 PAF (PAROXYSMAL ATRIAL FIBRILLATION) (H): ICD-10-CM

## 2021-09-27 RX ORDER — SOTALOL HYDROCHLORIDE 80 MG/1
TABLET ORAL
Qty: 180 TABLET | Refills: 0 | Status: SHIPPED | OUTPATIENT
Start: 2021-09-27 | End: 2021-12-23

## 2021-10-22 ENCOUNTER — ANCILLARY PROCEDURE (OUTPATIENT)
Dept: CARDIOLOGY | Facility: CLINIC | Age: 76
End: 2021-10-22
Attending: INTERNAL MEDICINE
Payer: COMMERCIAL

## 2021-10-22 DIAGNOSIS — I49.5 SSS (SICK SINUS SYNDROME) (H): ICD-10-CM

## 2021-10-22 DIAGNOSIS — Z95.0 PACEMAKER: ICD-10-CM

## 2021-10-22 PROCEDURE — 93296 REM INTERROG EVL PM/IDS: CPT | Performed by: INTERNAL MEDICINE

## 2021-10-22 PROCEDURE — 93294 REM INTERROG EVL PM/LDLS PM: CPT | Performed by: INTERNAL MEDICINE

## 2021-10-24 ENCOUNTER — HEALTH MAINTENANCE LETTER (OUTPATIENT)
Age: 76
End: 2021-10-24

## 2021-11-01 LAB
MDC_IDC_LEAD_IMPLANT_DT: NORMAL
MDC_IDC_LEAD_IMPLANT_DT: NORMAL
MDC_IDC_LEAD_LOCATION: NORMAL
MDC_IDC_LEAD_LOCATION: NORMAL
MDC_IDC_LEAD_LOCATION_DETAIL_1: NORMAL
MDC_IDC_LEAD_LOCATION_DETAIL_1: NORMAL
MDC_IDC_LEAD_MFG: NORMAL
MDC_IDC_LEAD_MFG: NORMAL
MDC_IDC_LEAD_MODEL: NORMAL
MDC_IDC_LEAD_MODEL: NORMAL
MDC_IDC_LEAD_POLARITY_TYPE: NORMAL
MDC_IDC_LEAD_POLARITY_TYPE: NORMAL
MDC_IDC_LEAD_SERIAL: NORMAL
MDC_IDC_LEAD_SERIAL: NORMAL
MDC_IDC_LEAD_SPECIAL_FUNCTION: NORMAL
MDC_IDC_LEAD_SPECIAL_FUNCTION: NORMAL
MDC_IDC_MSMT_BATTERY_DTM: NORMAL
MDC_IDC_MSMT_BATTERY_REMAINING_LONGEVITY: 152 MO
MDC_IDC_MSMT_BATTERY_RRT_TRIGGER: 2.62
MDC_IDC_MSMT_BATTERY_STATUS: NORMAL
MDC_IDC_MSMT_BATTERY_VOLTAGE: 3.06 V
MDC_IDC_MSMT_LEADCHNL_RA_IMPEDANCE_VALUE: 342 OHM
MDC_IDC_MSMT_LEADCHNL_RA_IMPEDANCE_VALUE: 551 OHM
MDC_IDC_MSMT_LEADCHNL_RA_PACING_THRESHOLD_AMPLITUDE: 0.62 V
MDC_IDC_MSMT_LEADCHNL_RA_PACING_THRESHOLD_PULSEWIDTH: 0.4 MS
MDC_IDC_MSMT_LEADCHNL_RA_SENSING_INTR_AMPL: 2.75 MV
MDC_IDC_MSMT_LEADCHNL_RA_SENSING_INTR_AMPL: 2.75 MV
MDC_IDC_MSMT_LEADCHNL_RV_IMPEDANCE_VALUE: 399 OHM
MDC_IDC_MSMT_LEADCHNL_RV_IMPEDANCE_VALUE: 494 OHM
MDC_IDC_MSMT_LEADCHNL_RV_PACING_THRESHOLD_AMPLITUDE: 0.62 V
MDC_IDC_MSMT_LEADCHNL_RV_PACING_THRESHOLD_PULSEWIDTH: 0.4 MS
MDC_IDC_MSMT_LEADCHNL_RV_SENSING_INTR_AMPL: 6.38 MV
MDC_IDC_MSMT_LEADCHNL_RV_SENSING_INTR_AMPL: 6.38 MV
MDC_IDC_PG_IMPLANT_DTM: NORMAL
MDC_IDC_PG_MFG: NORMAL
MDC_IDC_PG_MODEL: NORMAL
MDC_IDC_PG_SERIAL: NORMAL
MDC_IDC_PG_TYPE: NORMAL
MDC_IDC_SESS_CLINIC_NAME: NORMAL
MDC_IDC_SESS_DTM: NORMAL
MDC_IDC_SESS_TYPE: NORMAL
MDC_IDC_SET_BRADY_AT_MODE_SWITCH_RATE: 171 {BEATS}/MIN
MDC_IDC_SET_BRADY_HYSTRATE: NORMAL
MDC_IDC_SET_BRADY_LOWRATE: 70 {BEATS}/MIN
MDC_IDC_SET_BRADY_MAX_SENSOR_RATE: 130 {BEATS}/MIN
MDC_IDC_SET_BRADY_MAX_TRACKING_RATE: 130 {BEATS}/MIN
MDC_IDC_SET_BRADY_MODE: NORMAL
MDC_IDC_SET_BRADY_PAV_DELAY_LOW: 180 MS
MDC_IDC_SET_BRADY_SAV_DELAY_LOW: 150 MS
MDC_IDC_SET_LEADCHNL_RA_PACING_AMPLITUDE: 1.5 V
MDC_IDC_SET_LEADCHNL_RA_PACING_ANODE_ELECTRODE_1: NORMAL
MDC_IDC_SET_LEADCHNL_RA_PACING_ANODE_LOCATION_1: NORMAL
MDC_IDC_SET_LEADCHNL_RA_PACING_CAPTURE_MODE: NORMAL
MDC_IDC_SET_LEADCHNL_RA_PACING_CATHODE_ELECTRODE_1: NORMAL
MDC_IDC_SET_LEADCHNL_RA_PACING_CATHODE_LOCATION_1: NORMAL
MDC_IDC_SET_LEADCHNL_RA_PACING_POLARITY: NORMAL
MDC_IDC_SET_LEADCHNL_RA_PACING_PULSEWIDTH: 0.4 MS
MDC_IDC_SET_LEADCHNL_RA_SENSING_ANODE_ELECTRODE_1: NORMAL
MDC_IDC_SET_LEADCHNL_RA_SENSING_ANODE_LOCATION_1: NORMAL
MDC_IDC_SET_LEADCHNL_RA_SENSING_CATHODE_ELECTRODE_1: NORMAL
MDC_IDC_SET_LEADCHNL_RA_SENSING_CATHODE_LOCATION_1: NORMAL
MDC_IDC_SET_LEADCHNL_RA_SENSING_POLARITY: NORMAL
MDC_IDC_SET_LEADCHNL_RA_SENSING_SENSITIVITY: 0.45 MV
MDC_IDC_SET_LEADCHNL_RV_PACING_AMPLITUDE: 1.5 V
MDC_IDC_SET_LEADCHNL_RV_PACING_ANODE_ELECTRODE_1: NORMAL
MDC_IDC_SET_LEADCHNL_RV_PACING_ANODE_LOCATION_1: NORMAL
MDC_IDC_SET_LEADCHNL_RV_PACING_CAPTURE_MODE: NORMAL
MDC_IDC_SET_LEADCHNL_RV_PACING_CATHODE_ELECTRODE_1: NORMAL
MDC_IDC_SET_LEADCHNL_RV_PACING_CATHODE_LOCATION_1: NORMAL
MDC_IDC_SET_LEADCHNL_RV_PACING_POLARITY: NORMAL
MDC_IDC_SET_LEADCHNL_RV_PACING_PULSEWIDTH: 0.4 MS
MDC_IDC_SET_LEADCHNL_RV_SENSING_ANODE_ELECTRODE_1: NORMAL
MDC_IDC_SET_LEADCHNL_RV_SENSING_ANODE_LOCATION_1: NORMAL
MDC_IDC_SET_LEADCHNL_RV_SENSING_CATHODE_ELECTRODE_1: NORMAL
MDC_IDC_SET_LEADCHNL_RV_SENSING_CATHODE_LOCATION_1: NORMAL
MDC_IDC_SET_LEADCHNL_RV_SENSING_POLARITY: NORMAL
MDC_IDC_SET_LEADCHNL_RV_SENSING_SENSITIVITY: 0.9 MV
MDC_IDC_SET_ZONE_DETECTION_INTERVAL: 200 MS
MDC_IDC_SET_ZONE_DETECTION_INTERVAL: 350 MS
MDC_IDC_SET_ZONE_DETECTION_INTERVAL: 400 MS
MDC_IDC_SET_ZONE_TYPE: NORMAL
MDC_IDC_STAT_AT_BURDEN_PERCENT: 0 %
MDC_IDC_STAT_AT_DTM_END: NORMAL
MDC_IDC_STAT_AT_DTM_START: NORMAL
MDC_IDC_STAT_BRADY_AP_VP_PERCENT: 0.02 %
MDC_IDC_STAT_BRADY_AP_VS_PERCENT: 88.32 %
MDC_IDC_STAT_BRADY_AS_VP_PERCENT: 0 %
MDC_IDC_STAT_BRADY_AS_VS_PERCENT: 11.65 %
MDC_IDC_STAT_BRADY_DTM_END: NORMAL
MDC_IDC_STAT_BRADY_DTM_START: NORMAL
MDC_IDC_STAT_BRADY_RA_PERCENT_PACED: 87.07 %
MDC_IDC_STAT_BRADY_RV_PERCENT_PACED: 0.03 %
MDC_IDC_STAT_EPISODE_RECENT_COUNT: 0
MDC_IDC_STAT_EPISODE_RECENT_COUNT_DTM_END: NORMAL
MDC_IDC_STAT_EPISODE_RECENT_COUNT_DTM_START: NORMAL
MDC_IDC_STAT_EPISODE_TOTAL_COUNT: 0
MDC_IDC_STAT_EPISODE_TOTAL_COUNT: 1
MDC_IDC_STAT_EPISODE_TOTAL_COUNT_DTM_END: NORMAL
MDC_IDC_STAT_EPISODE_TOTAL_COUNT_DTM_START: NORMAL
MDC_IDC_STAT_EPISODE_TYPE: NORMAL

## 2021-11-02 NOTE — TELEPHONE ENCOUNTER
Pt was called back for Mario Ablerto see note from 10/21 from Mario Alberto  Pt is off hydrochlorothiazide and checking BP and HR morning and zi  11/2 /75 HR 91 zi 120/76 HR 95  11/3 /75 HR 81 zi  132/83 HR 89  11/4 /79 HR 82  Since off the hydrochlorothiazide only up once at night to void and this makes her very happy and is feeling good  Reviewed with Mario Alberto and no changes are being made pt is to call if any changes  Pt agrees to plan and has my direct number for contact    Detail Level: Generalized Instructions: This plan will send the code FBSE to the PM system.  DO NOT or CHANGE the price. Price (Do Not Change): 0.00 Xeljanz Counseling: I discussed with the patient the risks of Xeljanz therapy including increased risk of infection, liver issues, headache, diarrhea, or cold symptoms. Live vaccines should be avoided. They were instructed to call if they have any problems.

## 2021-12-07 DIAGNOSIS — E78.5 HYPERLIPIDEMIA: ICD-10-CM

## 2021-12-07 RX ORDER — ATORVASTATIN CALCIUM 10 MG/1
10 TABLET, FILM COATED ORAL AT BEDTIME
Qty: 90 TABLET | Refills: 2 | Status: SHIPPED | OUTPATIENT
Start: 2021-12-07 | End: 2022-02-28
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
## 2022-01-13 ENCOUNTER — OFFICE VISIT (OUTPATIENT)
Dept: INTERNAL MEDICINE | Facility: CLINIC | Age: 77
End: 2022-01-13
Payer: COMMERCIAL

## 2022-01-13 VITALS
BODY MASS INDEX: 31.41 KG/M2 | HEIGHT: 64 IN | WEIGHT: 184 LBS | HEART RATE: 73 BPM | DIASTOLIC BLOOD PRESSURE: 78 MMHG | OXYGEN SATURATION: 98 % | SYSTOLIC BLOOD PRESSURE: 122 MMHG

## 2022-01-13 DIAGNOSIS — Z95.0 CARDIAC PACEMAKER IN SITU: ICD-10-CM

## 2022-01-13 DIAGNOSIS — M41.9 SCOLIOSIS OF LUMBOSACRAL SPINE, UNSPECIFIED SCOLIOSIS TYPE: ICD-10-CM

## 2022-01-13 DIAGNOSIS — I48.0 PAF (PAROXYSMAL ATRIAL FIBRILLATION) (H): ICD-10-CM

## 2022-01-13 DIAGNOSIS — Z85.3 PERSONAL HISTORY OF MALIGNANT NEOPLASM OF BREAST: ICD-10-CM

## 2022-01-13 DIAGNOSIS — I10 ESSENTIAL HYPERTENSION, BENIGN: ICD-10-CM

## 2022-01-13 DIAGNOSIS — G25.0 BENIGN ESSENTIAL TREMOR: ICD-10-CM

## 2022-01-13 DIAGNOSIS — E78.2 MIXED DYSLIPIDEMIA: Primary | ICD-10-CM

## 2022-01-13 PROBLEM — F32.4 MAJOR DEPRESSIVE DISORDER IN PARTIAL REMISSION (H): Status: ACTIVE | Noted: 2022-01-13

## 2022-01-13 LAB
ALBUMIN SERPL-MCNC: 4 G/DL (ref 3.5–5)
ALP SERPL-CCNC: 68 U/L (ref 45–120)
ALT SERPL W P-5'-P-CCNC: 21 U/L (ref 0–45)
ANION GAP SERPL CALCULATED.3IONS-SCNC: 10 MMOL/L (ref 5–18)
AST SERPL W P-5'-P-CCNC: 27 U/L (ref 0–40)
BILIRUB SERPL-MCNC: 0.5 MG/DL (ref 0–1)
BUN SERPL-MCNC: 17 MG/DL (ref 8–28)
CALCIUM SERPL-MCNC: 9.7 MG/DL (ref 8.5–10.5)
CHLORIDE BLD-SCNC: 101 MMOL/L (ref 98–107)
CO2 SERPL-SCNC: 25 MMOL/L (ref 22–31)
CREAT SERPL-MCNC: 0.86 MG/DL (ref 0.6–1.1)
ERYTHROCYTE [DISTWIDTH] IN BLOOD BY AUTOMATED COUNT: 12.7 % (ref 10–15)
GFR SERPL CREATININE-BSD FRML MDRD: 69 ML/MIN/1.73M2
GLUCOSE BLD-MCNC: 87 MG/DL (ref 70–125)
HCT VFR BLD AUTO: 43.5 % (ref 35–47)
HGB BLD-MCNC: 13.9 G/DL (ref 11.7–15.7)
MCH RBC QN AUTO: 29.7 PG (ref 26.5–33)
MCHC RBC AUTO-ENTMCNC: 32 G/DL (ref 31.5–36.5)
MCV RBC AUTO: 93 FL (ref 78–100)
PLATELET # BLD AUTO: 277 10E3/UL (ref 150–450)
POTASSIUM BLD-SCNC: 4.8 MMOL/L (ref 3.5–5)
PROT SERPL-MCNC: 6.6 G/DL (ref 6–8)
RBC # BLD AUTO: 4.68 10E6/UL (ref 3.8–5.2)
SODIUM SERPL-SCNC: 136 MMOL/L (ref 136–145)
TSH SERPL DL<=0.005 MIU/L-ACNC: 1.09 UIU/ML (ref 0.3–5)
WBC # BLD AUTO: 7.6 10E3/UL (ref 4–11)

## 2022-01-13 PROCEDURE — 80053 COMPREHEN METABOLIC PANEL: CPT | Performed by: INTERNAL MEDICINE

## 2022-01-13 PROCEDURE — 36415 COLL VENOUS BLD VENIPUNCTURE: CPT | Performed by: INTERNAL MEDICINE

## 2022-01-13 PROCEDURE — 99204 OFFICE O/P NEW MOD 45 MIN: CPT | Performed by: INTERNAL MEDICINE

## 2022-01-13 PROCEDURE — 84443 ASSAY THYROID STIM HORMONE: CPT | Performed by: INTERNAL MEDICINE

## 2022-01-13 PROCEDURE — 85027 COMPLETE CBC AUTOMATED: CPT | Performed by: INTERNAL MEDICINE

## 2022-01-13 ASSESSMENT — MIFFLIN-ST. JEOR: SCORE: 1299.62
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
## 2022-02-17 ENCOUNTER — OFFICE VISIT (OUTPATIENT)
Dept: INTERNAL MEDICINE | Facility: CLINIC | Age: 77
End: 2022-02-17
Payer: COMMERCIAL

## 2022-02-17 VITALS
DIASTOLIC BLOOD PRESSURE: 86 MMHG | OXYGEN SATURATION: 99 % | WEIGHT: 185.3 LBS | BODY MASS INDEX: 31.81 KG/M2 | HEART RATE: 79 BPM | SYSTOLIC BLOOD PRESSURE: 120 MMHG | TEMPERATURE: 98.2 F

## 2022-02-17 DIAGNOSIS — F32.4 MAJOR DEPRESSIVE DISORDER IN PARTIAL REMISSION, UNSPECIFIED WHETHER RECURRENT (H): ICD-10-CM

## 2022-02-17 DIAGNOSIS — H25.013 CORTICAL AGE-RELATED CATARACT OF BOTH EYES: ICD-10-CM

## 2022-02-17 DIAGNOSIS — Z01.818 PREOP GENERAL PHYSICAL EXAM: Primary | ICD-10-CM

## 2022-02-17 PROCEDURE — 99214 OFFICE O/P EST MOD 30 MIN: CPT | Performed by: INTERNAL MEDICINE

## 2022-02-17 ASSESSMENT — ANXIETY QUESTIONNAIRES
2. NOT BEING ABLE TO STOP OR CONTROL WORRYING: SEVERAL DAYS
1. FEELING NERVOUS, ANXIOUS, OR ON EDGE: SEVERAL DAYS
5. BEING SO RESTLESS THAT IT IS HARD TO SIT STILL: NOT AT ALL
6. BECOMING EASILY ANNOYED OR IRRITABLE: SEVERAL DAYS
GAD7 TOTAL SCORE: 5
IF YOU CHECKED OFF ANY PROBLEMS ON THIS QUESTIONNAIRE, HOW DIFFICULT HAVE THESE PROBLEMS MADE IT FOR YOU TO DO YOUR WORK, TAKE CARE OF THINGS AT HOME, OR GET ALONG WITH OTHER PEOPLE: SOMEWHAT DIFFICULT
3. WORRYING TOO MUCH ABOUT DIFFERENT THINGS: SEVERAL DAYS
7. FEELING AFRAID AS IF SOMETHING AWFUL MIGHT HAPPEN: NOT AT ALL

## 2022-02-17 ASSESSMENT — PATIENT HEALTH QUESTIONNAIRE - PHQ9
SUM OF ALL RESPONSES TO PHQ QUESTIONS 1-9: 6
5. POOR APPETITE OR OVEREATING: SEVERAL DAYS
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
## 2022-02-18 ASSESSMENT — ANXIETY QUESTIONNAIRES: GAD7 TOTAL SCORE: 5

## 2022-02-22 ENCOUNTER — ANCILLARY PROCEDURE (OUTPATIENT)
Dept: CARDIOLOGY | Facility: CLINIC | Age: 77
End: 2022-02-22
Attending: INTERNAL MEDICINE
Payer: COMMERCIAL

## 2022-02-22 DIAGNOSIS — Z95.0 PACEMAKER: ICD-10-CM

## 2022-02-22 DIAGNOSIS — I49.5 SSS (SICK SINUS SYNDROME) (H): ICD-10-CM

## 2022-02-22 LAB
MDC_IDC_EPISODE_DTM: NORMAL
MDC_IDC_EPISODE_DURATION: 5964 S
MDC_IDC_EPISODE_ID: 2
MDC_IDC_EPISODE_TYPE: NORMAL
MDC_IDC_LEAD_IMPLANT_DT: NORMAL
MDC_IDC_LEAD_IMPLANT_DT: NORMAL
MDC_IDC_LEAD_LOCATION: NORMAL
MDC_IDC_LEAD_LOCATION: NORMAL
MDC_IDC_LEAD_LOCATION_DETAIL_1: NORMAL
MDC_IDC_LEAD_LOCATION_DETAIL_1: NORMAL
MDC_IDC_LEAD_MFG: NORMAL
MDC_IDC_LEAD_MFG: NORMAL
MDC_IDC_LEAD_MODEL: NORMAL
MDC_IDC_LEAD_MODEL: NORMAL
MDC_IDC_LEAD_POLARITY_TYPE: NORMAL
MDC_IDC_LEAD_POLARITY_TYPE: NORMAL
MDC_IDC_LEAD_SERIAL: NORMAL
MDC_IDC_LEAD_SERIAL: NORMAL
MDC_IDC_LEAD_SPECIAL_FUNCTION: NORMAL
MDC_IDC_LEAD_SPECIAL_FUNCTION: NORMAL
MDC_IDC_MSMT_BATTERY_DTM: NORMAL
MDC_IDC_MSMT_BATTERY_REMAINING_LONGEVITY: 147 MO
MDC_IDC_MSMT_BATTERY_RRT_TRIGGER: 2.62
MDC_IDC_MSMT_BATTERY_STATUS: NORMAL
MDC_IDC_MSMT_BATTERY_VOLTAGE: 3.04 V
MDC_IDC_MSMT_LEADCHNL_RA_IMPEDANCE_VALUE: 361 OHM
MDC_IDC_MSMT_LEADCHNL_RA_IMPEDANCE_VALUE: 513 OHM
MDC_IDC_MSMT_LEADCHNL_RA_PACING_THRESHOLD_AMPLITUDE: 0.5 V
MDC_IDC_MSMT_LEADCHNL_RA_PACING_THRESHOLD_PULSEWIDTH: 0.4 MS
MDC_IDC_MSMT_LEADCHNL_RA_SENSING_INTR_AMPL: 2.75 MV
MDC_IDC_MSMT_LEADCHNL_RA_SENSING_INTR_AMPL: 2.75 MV
MDC_IDC_MSMT_LEADCHNL_RV_IMPEDANCE_VALUE: 399 OHM
MDC_IDC_MSMT_LEADCHNL_RV_IMPEDANCE_VALUE: 551 OHM
MDC_IDC_MSMT_LEADCHNL_RV_PACING_THRESHOLD_AMPLITUDE: 0.62 V
MDC_IDC_MSMT_LEADCHNL_RV_PACING_THRESHOLD_PULSEWIDTH: 0.4 MS
MDC_IDC_MSMT_LEADCHNL_RV_SENSING_INTR_AMPL: 5.75 MV
MDC_IDC_MSMT_LEADCHNL_RV_SENSING_INTR_AMPL: 5.75 MV
MDC_IDC_PG_IMPLANT_DTM: NORMAL
MDC_IDC_PG_MFG: NORMAL
MDC_IDC_PG_MODEL: NORMAL
MDC_IDC_PG_SERIAL: NORMAL
MDC_IDC_PG_TYPE: NORMAL
MDC_IDC_SESS_CLINIC_NAME: NORMAL
MDC_IDC_SESS_DTM: NORMAL
MDC_IDC_SESS_TYPE: NORMAL
MDC_IDC_SET_BRADY_AT_MODE_SWITCH_RATE: 171 {BEATS}/MIN
MDC_IDC_SET_BRADY_HYSTRATE: NORMAL
MDC_IDC_SET_BRADY_LOWRATE: 70 {BEATS}/MIN
MDC_IDC_SET_BRADY_MAX_SENSOR_RATE: 130 {BEATS}/MIN
MDC_IDC_SET_BRADY_MAX_TRACKING_RATE: 130 {BEATS}/MIN
MDC_IDC_SET_BRADY_MODE: NORMAL
MDC_IDC_SET_BRADY_PAV_DELAY_LOW: 180 MS
MDC_IDC_SET_BRADY_SAV_DELAY_LOW: 150 MS
MDC_IDC_SET_LEADCHNL_RA_PACING_AMPLITUDE: 1.5 V
MDC_IDC_SET_LEADCHNL_RA_PACING_ANODE_ELECTRODE_1: NORMAL
MDC_IDC_SET_LEADCHNL_RA_PACING_ANODE_LOCATION_1: NORMAL
MDC_IDC_SET_LEADCHNL_RA_PACING_CAPTURE_MODE: NORMAL
MDC_IDC_SET_LEADCHNL_RA_PACING_CATHODE_ELECTRODE_1: NORMAL
MDC_IDC_SET_LEADCHNL_RA_PACING_CATHODE_LOCATION_1: NORMAL
MDC_IDC_SET_LEADCHNL_RA_PACING_POLARITY: NORMAL
MDC_IDC_SET_LEADCHNL_RA_PACING_PULSEWIDTH: 0.4 MS
MDC_IDC_SET_LEADCHNL_RA_SENSING_ANODE_ELECTRODE_1: NORMAL
MDC_IDC_SET_LEADCHNL_RA_SENSING_ANODE_LOCATION_1: NORMAL
MDC_IDC_SET_LEADCHNL_RA_SENSING_CATHODE_ELECTRODE_1: NORMAL
MDC_IDC_SET_LEADCHNL_RA_SENSING_CATHODE_LOCATION_1: NORMAL
MDC_IDC_SET_LEADCHNL_RA_SENSING_POLARITY: NORMAL
MDC_IDC_SET_LEADCHNL_RA_SENSING_SENSITIVITY: 0.45 MV
MDC_IDC_SET_LEADCHNL_RV_PACING_AMPLITUDE: 1.5 V
MDC_IDC_SET_LEADCHNL_RV_PACING_ANODE_ELECTRODE_1: NORMAL
MDC_IDC_SET_LEADCHNL_RV_PACING_ANODE_LOCATION_1: NORMAL
MDC_IDC_SET_LEADCHNL_RV_PACING_CAPTURE_MODE: NORMAL
MDC_IDC_SET_LEADCHNL_RV_PACING_CATHODE_ELECTRODE_1: NORMAL
MDC_IDC_SET_LEADCHNL_RV_PACING_CATHODE_LOCATION_1: NORMAL
MDC_IDC_SET_LEADCHNL_RV_PACING_POLARITY: NORMAL
MDC_IDC_SET_LEADCHNL_RV_PACING_PULSEWIDTH: 0.4 MS
MDC_IDC_SET_LEADCHNL_RV_SENSING_ANODE_ELECTRODE_1: NORMAL
MDC_IDC_SET_LEADCHNL_RV_SENSING_ANODE_LOCATION_1: NORMAL
MDC_IDC_SET_LEADCHNL_RV_SENSING_CATHODE_ELECTRODE_1: NORMAL
MDC_IDC_SET_LEADCHNL_RV_SENSING_CATHODE_LOCATION_1: NORMAL
MDC_IDC_SET_LEADCHNL_RV_SENSING_POLARITY: NORMAL
MDC_IDC_SET_LEADCHNL_RV_SENSING_SENSITIVITY: 0.9 MV
MDC_IDC_SET_ZONE_DETECTION_INTERVAL: 200 MS
MDC_IDC_SET_ZONE_DETECTION_INTERVAL: 350 MS
MDC_IDC_SET_ZONE_DETECTION_INTERVAL: 400 MS
MDC_IDC_SET_ZONE_TYPE: NORMAL
MDC_IDC_STAT_AT_BURDEN_PERCENT: 0.1 %
MDC_IDC_STAT_AT_DTM_END: NORMAL
MDC_IDC_STAT_AT_DTM_START: NORMAL
MDC_IDC_STAT_BRADY_AP_VP_PERCENT: 0.02 %
MDC_IDC_STAT_BRADY_AP_VS_PERCENT: 85.38 %
MDC_IDC_STAT_BRADY_AS_VP_PERCENT: 0.01 %
MDC_IDC_STAT_BRADY_AS_VS_PERCENT: 14.59 %
MDC_IDC_STAT_BRADY_DTM_END: NORMAL
MDC_IDC_STAT_BRADY_DTM_START: NORMAL
MDC_IDC_STAT_BRADY_RA_PERCENT_PACED: 83.83 %
MDC_IDC_STAT_BRADY_RV_PERCENT_PACED: 0.04 %
MDC_IDC_STAT_EPISODE_RECENT_COUNT: 0
MDC_IDC_STAT_EPISODE_RECENT_COUNT: 1
MDC_IDC_STAT_EPISODE_RECENT_COUNT_DTM_END: NORMAL
MDC_IDC_STAT_EPISODE_RECENT_COUNT_DTM_START: NORMAL
MDC_IDC_STAT_EPISODE_TOTAL_COUNT: 0
MDC_IDC_STAT_EPISODE_TOTAL_COUNT: 1
MDC_IDC_STAT_EPISODE_TOTAL_COUNT: 1
MDC_IDC_STAT_EPISODE_TOTAL_COUNT_DTM_END: NORMAL
MDC_IDC_STAT_EPISODE_TOTAL_COUNT_DTM_START: NORMAL
MDC_IDC_STAT_EPISODE_TYPE: NORMAL

## 2022-02-22 PROCEDURE — 93294 REM INTERROG EVL PM/LDLS PM: CPT | Performed by: INTERNAL MEDICINE

## 2022-02-22 PROCEDURE — 93296 REM INTERROG EVL PM/IDS: CPT | Performed by: INTERNAL MEDICINE

## 2022-02-23 ENCOUNTER — TELEPHONE (OUTPATIENT)
Dept: INTERNAL MEDICINE | Facility: CLINIC | Age: 77
End: 2022-02-23
Payer: COMMERCIAL

## 2022-02-23 NOTE — TELEPHONE ENCOUNTER
Patient has been on sertraline X 20 years @ 25 mg  With no problems. She has had a lot of anxiety due to eye surgeries. She increased it to 50mg as you suggested and takes it in the morning. Since this change 5 days ago she has been getting headaches in the morning. Can she try to taking it at around 2pm? What would you suggest. She takes Sotalol, Eliquis, aspirin, lisinopril, and sertraline together in the morning.    Sonja Montiel CMA

## 2022-02-23 NOTE — TELEPHONE ENCOUNTER
Reason for Call:  Other prescription    Detailed comments: Patient called and stated she believes her sertraline is causing her to have a headache. Patient is wondering if she could start taking the sertraline at a different time of the day. Patient is questioning the dosage. Patient would like to talk to PCP/care team about the sertraline. Please call patient back to discuss her questions/concerns.    Phone Number Patient can be reached at: Home number on file [phone] (home)    Best Time: any    Can we leave a detailed message on this number? YES    Call taken on 2/23/2022 at 3:05 PM by Gilma Munroe

## 2022-02-28 DIAGNOSIS — E78.5 HYPERLIPIDEMIA: ICD-10-CM

## 2022-02-28 RX ORDER — ATORVASTATIN CALCIUM 10 MG/1
TABLET, FILM COATED ORAL
Qty: 90 TABLET | Refills: 2 | Status: SHIPPED | OUTPATIENT
Start: 2022-02-28 | End: 2023-01-06

## 2022-04-20 ENCOUNTER — OFFICE VISIT (OUTPATIENT)
Dept: CARDIOLOGY | Facility: CLINIC | Age: 77
End: 2022-04-20
Attending: INTERNAL MEDICINE
Payer: COMMERCIAL

## 2022-04-20 VITALS — WEIGHT: 186.4 LBS | DIASTOLIC BLOOD PRESSURE: 80 MMHG | BODY MASS INDEX: 32 KG/M2 | SYSTOLIC BLOOD PRESSURE: 130 MMHG

## 2022-04-20 DIAGNOSIS — I49.5 SICK SINUS SYNDROME (H): ICD-10-CM

## 2022-04-20 DIAGNOSIS — I48.0 PAF (PAROXYSMAL ATRIAL FIBRILLATION) (H): Primary | ICD-10-CM

## 2022-04-20 DIAGNOSIS — I10 BENIGN ESSENTIAL HYPERTENSION: Chronic | ICD-10-CM

## 2022-04-20 DIAGNOSIS — Z95.0 CARDIAC PACEMAKER IN SITU: ICD-10-CM

## 2022-04-20 LAB
ATRIAL RATE - MUSE: 72 BPM
DIASTOLIC BLOOD PRESSURE - MUSE: NORMAL MMHG
INTERPRETATION ECG - MUSE: NORMAL
MDC_IDC_LEAD_IMPLANT_DT: NORMAL
MDC_IDC_LEAD_IMPLANT_DT: NORMAL
MDC_IDC_LEAD_LOCATION: NORMAL
MDC_IDC_LEAD_LOCATION: NORMAL
MDC_IDC_LEAD_LOCATION_DETAIL_1: NORMAL
MDC_IDC_LEAD_LOCATION_DETAIL_1: NORMAL
MDC_IDC_LEAD_MFG: NORMAL
MDC_IDC_LEAD_MFG: NORMAL
MDC_IDC_LEAD_MODEL: NORMAL
MDC_IDC_LEAD_MODEL: NORMAL
MDC_IDC_LEAD_POLARITY_TYPE: NORMAL
MDC_IDC_LEAD_POLARITY_TYPE: NORMAL
MDC_IDC_LEAD_SERIAL: NORMAL
MDC_IDC_LEAD_SERIAL: NORMAL
MDC_IDC_LEAD_SPECIAL_FUNCTION: NORMAL
MDC_IDC_LEAD_SPECIAL_FUNCTION: NORMAL
MDC_IDC_MSMT_BATTERY_DTM: NORMAL
MDC_IDC_MSMT_BATTERY_REMAINING_LONGEVITY: 146 MO
MDC_IDC_MSMT_BATTERY_RRT_TRIGGER: 2.62
MDC_IDC_MSMT_BATTERY_STATUS: NORMAL
MDC_IDC_MSMT_BATTERY_VOLTAGE: 3.04 V
MDC_IDC_MSMT_LEADCHNL_RA_IMPEDANCE_VALUE: 380 OHM
MDC_IDC_MSMT_LEADCHNL_RA_IMPEDANCE_VALUE: 532 OHM
MDC_IDC_MSMT_LEADCHNL_RA_PACING_THRESHOLD_AMPLITUDE: 0.5 V
MDC_IDC_MSMT_LEADCHNL_RA_PACING_THRESHOLD_AMPLITUDE: 0.62 V
MDC_IDC_MSMT_LEADCHNL_RA_PACING_THRESHOLD_PULSEWIDTH: 0.4 MS
MDC_IDC_MSMT_LEADCHNL_RA_PACING_THRESHOLD_PULSEWIDTH: 0.4 MS
MDC_IDC_MSMT_LEADCHNL_RA_SENSING_INTR_AMPL: 2.75 MV
MDC_IDC_MSMT_LEADCHNL_RA_SENSING_INTR_AMPL: 3.4 MV
MDC_IDC_MSMT_LEADCHNL_RV_IMPEDANCE_VALUE: 456 OHM
MDC_IDC_MSMT_LEADCHNL_RV_IMPEDANCE_VALUE: 551 OHM
MDC_IDC_MSMT_LEADCHNL_RV_PACING_THRESHOLD_AMPLITUDE: 0.5 V
MDC_IDC_MSMT_LEADCHNL_RV_PACING_THRESHOLD_AMPLITUDE: 0.5 V
MDC_IDC_MSMT_LEADCHNL_RV_PACING_THRESHOLD_PULSEWIDTH: 0.4 MS
MDC_IDC_MSMT_LEADCHNL_RV_PACING_THRESHOLD_PULSEWIDTH: 0.4 MS
MDC_IDC_MSMT_LEADCHNL_RV_SENSING_INTR_AMPL: 4.5 MV
MDC_IDC_MSMT_LEADCHNL_RV_SENSING_INTR_AMPL: 6.1 MV
MDC_IDC_PG_IMPLANT_DTM: NORMAL
MDC_IDC_PG_MFG: NORMAL
MDC_IDC_PG_MODEL: NORMAL
MDC_IDC_PG_SERIAL: NORMAL
MDC_IDC_PG_TYPE: NORMAL
MDC_IDC_SESS_CLINIC_NAME: NORMAL
MDC_IDC_SESS_DTM: NORMAL
MDC_IDC_SESS_TYPE: NORMAL
MDC_IDC_SET_BRADY_AT_MODE_SWITCH_RATE: 171 {BEATS}/MIN
MDC_IDC_SET_BRADY_HYSTRATE: NORMAL
MDC_IDC_SET_BRADY_LOWRATE: 70 {BEATS}/MIN
MDC_IDC_SET_BRADY_MAX_SENSOR_RATE: 130 {BEATS}/MIN
MDC_IDC_SET_BRADY_MAX_TRACKING_RATE: 130 {BEATS}/MIN
MDC_IDC_SET_BRADY_MODE: NORMAL
MDC_IDC_SET_BRADY_PAV_DELAY_LOW: 180 MS
MDC_IDC_SET_BRADY_SAV_DELAY_LOW: 150 MS
MDC_IDC_SET_LEADCHNL_RA_PACING_AMPLITUDE: NORMAL
MDC_IDC_SET_LEADCHNL_RA_PACING_ANODE_ELECTRODE_1: NORMAL
MDC_IDC_SET_LEADCHNL_RA_PACING_ANODE_LOCATION_1: NORMAL
MDC_IDC_SET_LEADCHNL_RA_PACING_CAPTURE_MODE: NORMAL
MDC_IDC_SET_LEADCHNL_RA_PACING_CATHODE_ELECTRODE_1: NORMAL
MDC_IDC_SET_LEADCHNL_RA_PACING_CATHODE_LOCATION_1: NORMAL
MDC_IDC_SET_LEADCHNL_RA_PACING_POLARITY: NORMAL
MDC_IDC_SET_LEADCHNL_RA_PACING_PULSEWIDTH: 0.4 MS
MDC_IDC_SET_LEADCHNL_RA_SENSING_ANODE_ELECTRODE_1: NORMAL
MDC_IDC_SET_LEADCHNL_RA_SENSING_ANODE_LOCATION_1: NORMAL
MDC_IDC_SET_LEADCHNL_RA_SENSING_CATHODE_ELECTRODE_1: NORMAL
MDC_IDC_SET_LEADCHNL_RA_SENSING_CATHODE_LOCATION_1: NORMAL
MDC_IDC_SET_LEADCHNL_RA_SENSING_POLARITY: NORMAL
MDC_IDC_SET_LEADCHNL_RA_SENSING_SENSITIVITY: 0.45 MV
MDC_IDC_SET_LEADCHNL_RV_PACING_AMPLITUDE: NORMAL
MDC_IDC_SET_LEADCHNL_RV_PACING_ANODE_ELECTRODE_1: NORMAL
MDC_IDC_SET_LEADCHNL_RV_PACING_ANODE_LOCATION_1: NORMAL
MDC_IDC_SET_LEADCHNL_RV_PACING_CAPTURE_MODE: NORMAL
MDC_IDC_SET_LEADCHNL_RV_PACING_CATHODE_ELECTRODE_1: NORMAL
MDC_IDC_SET_LEADCHNL_RV_PACING_CATHODE_LOCATION_1: NORMAL
MDC_IDC_SET_LEADCHNL_RV_PACING_POLARITY: NORMAL
MDC_IDC_SET_LEADCHNL_RV_PACING_PULSEWIDTH: 0.4 MS
MDC_IDC_SET_LEADCHNL_RV_SENSING_ANODE_ELECTRODE_1: NORMAL
MDC_IDC_SET_LEADCHNL_RV_SENSING_ANODE_LOCATION_1: NORMAL
MDC_IDC_SET_LEADCHNL_RV_SENSING_CATHODE_ELECTRODE_1: NORMAL
MDC_IDC_SET_LEADCHNL_RV_SENSING_CATHODE_LOCATION_1: NORMAL
MDC_IDC_SET_LEADCHNL_RV_SENSING_POLARITY: NORMAL
MDC_IDC_SET_LEADCHNL_RV_SENSING_SENSITIVITY: 0.9 MV
MDC_IDC_SET_ZONE_DETECTION_INTERVAL: 200 MS
MDC_IDC_SET_ZONE_DETECTION_INTERVAL: 350 MS
MDC_IDC_SET_ZONE_DETECTION_INTERVAL: 400 MS
MDC_IDC_SET_ZONE_TYPE: NORMAL
MDC_IDC_STAT_AT_BURDEN_PERCENT: 1 %
MDC_IDC_STAT_AT_DTM_END: NORMAL
MDC_IDC_STAT_AT_DTM_START: NORMAL
MDC_IDC_STAT_AT_MODE_SW_COUNT: 1
MDC_IDC_STAT_BRADY_AP_VP_PERCENT: 0.02 %
MDC_IDC_STAT_BRADY_AP_VS_PERCENT: 87.6 %
MDC_IDC_STAT_BRADY_AS_VP_PERCENT: 0.01 %
MDC_IDC_STAT_BRADY_AS_VS_PERCENT: 12.37 %
MDC_IDC_STAT_BRADY_DTM_END: NORMAL
MDC_IDC_STAT_BRADY_DTM_START: NORMAL
MDC_IDC_STAT_BRADY_RA_PERCENT_PACED: 87.6 %
MDC_IDC_STAT_BRADY_RV_PERCENT_PACED: 0.03 %
MDC_IDC_STAT_EPISODE_RECENT_COUNT: 1
MDC_IDC_STAT_EPISODE_RECENT_COUNT: 1
MDC_IDC_STAT_EPISODE_RECENT_COUNT_DTM_END: NORMAL
MDC_IDC_STAT_EPISODE_RECENT_COUNT_DTM_END: NORMAL
MDC_IDC_STAT_EPISODE_RECENT_COUNT_DTM_START: NORMAL
MDC_IDC_STAT_EPISODE_RECENT_COUNT_DTM_START: NORMAL
MDC_IDC_STAT_EPISODE_TOTAL_COUNT: 0
MDC_IDC_STAT_EPISODE_TOTAL_COUNT: 1
MDC_IDC_STAT_EPISODE_TOTAL_COUNT: 1
MDC_IDC_STAT_EPISODE_TOTAL_COUNT_DTM_END: NORMAL
MDC_IDC_STAT_EPISODE_TOTAL_COUNT_DTM_START: NORMAL
MDC_IDC_STAT_EPISODE_TYPE: NORMAL
P AXIS - MUSE: NORMAL DEGREES
PR INTERVAL - MUSE: NORMAL MS
QRS DURATION - MUSE: 84 MS
QT - MUSE: 388 MS
QTC - MUSE: 427 MS
R AXIS - MUSE: 32 DEGREES
SYSTOLIC BLOOD PRESSURE - MUSE: NORMAL MMHG
T AXIS - MUSE: 40 DEGREES
VENTRICULAR RATE- MUSE: 73 BPM

## 2022-04-20 PROCEDURE — 93280 PM DEVICE PROGR EVAL DUAL: CPT | Performed by: INTERNAL MEDICINE

## 2022-04-20 PROCEDURE — 93000 ELECTROCARDIOGRAM COMPLETE: CPT | Performed by: INTERNAL MEDICINE

## 2022-04-20 PROCEDURE — 99215 OFFICE O/P EST HI 40 MIN: CPT | Mod: 25 | Performed by: NURSE PRACTITIONER

## 2022-04-20 RX ORDER — BROMFENAC SODIUM 0.7 MG/ML
1 SOLUTION/ DROPS OPHTHALMIC DAILY
COMMUNITY
End: 2023-01-06

## 2022-04-20 RX ORDER — PREDNISOLONE ACETATE 10 MG/ML
SUSPENSION/ DROPS OPHTHALMIC
COMMUNITY
Start: 2022-04-11 | End: 2023-01-06

## 2022-04-20 RX ORDER — SOTALOL HYDROCHLORIDE 80 MG/1
80 TABLET ORAL EVERY 12 HOURS
Qty: 180 TABLET | Refills: 3 | Status: SHIPPED | OUTPATIENT
Start: 2022-04-20 | End: 2023-04-20

## 2022-04-20 NOTE — PATIENT INSTRUCTIONS
Abiola Hallman,    It was a pleasure to see you today at the [facility].     My recommendations after this visit include:    Stop aspirin.    Cancel appointment with Dr. Mesa.      To followup with device check in clinic and see me in 1 year.      My contact information:  Mario Alberto Styles CNP  After Hours or Scheduling  [phone]  My Nurse---Cary Zhang [phone]

## 2022-04-20 NOTE — PROGRESS NOTES
Thank you, Dr. Sarkar, for asking the [facility] team to see Ms. Abiola Hallman to evaluate paroxysmal atrial fibrillation.    Assessment/Recommendations     Assessment/Plan:    Diagnoses and all orders for this visit:  PAF (paroxysmal atrial fibrillation) (H) and A. fib burden on device less than 0.1%.  Unclear to me that she has any symptoms with atrial fibrillation but she has A. fib with RVR.   ms on twelve-lead ECG today.  I recommended to continue low-dose sotalol as no side effects on medication and controlling her atrial fibs well.  Patient concurs.  She had TIA prior to knowledge of atrial fibrillation and was not on anticoagulation.  To stop aspirin.  Discussed increased risk of GI bleeding on combination of dual therapy if not needed.  She denies any other reason for aspirin on questioning.  Cardiac pacemaker in situ--dual chamber Medtronic for SND-SSS and device functioning appropriately.  Benign essential hypertension and blood pressure well below goal.      [id number] score of 6 and on Eliquis.  Eliquis is affordable for them.  Answered multiple questions regarding DOAC's.  If Xarelto were cheaper for them okay to switch to this.  She has no interest in switching to warfarin.  Follow up in clinic with device check and see me in 1 year.  To cancel appointment with Dr. Mesa and okay with following with me in place of Dr. Massey.     History of Present Illness/Subjective     Abiola Hallman is a very pleasant 76 year old female who comes in today for EP paroxysmal atrial fibrillation and had device check prior to visit.  Abiola Hallman has a known history of paroxysmal atrial fibrillation and presented with A. fib with RVR and started on metoprolol 25 mg daily and then returned several days later with symptomatic bradycardia with heart rate in the 40s so she got a permanent pacemaker in September 2020.  She was subsequently started on sotalol for A. fib.  Abiola has had a lot of  medical issues throughout her lifetime.  She has scoliosis and had significant back surgery.  She also had breast cancer and in remission.  She is currently had 3 eye surgeries.  She denies any cardiac symptoms on questioning.  Her activity is mainly limited by her diminished vision.  She is accompanied by her .      Cardiographics (reviewed):  September 2019 echo shows:  Narrative & Impression  1. Normal left ventricular size and systolic performance with a visually estimated ejection fraction of 60-65%.   2. No significant valvular heart disease is identified on this study.   3. Normal right ventricular size and systolic performance.   4. Echo contrast examination was performed using agitated NS as contrast agent. The right heart opacity was adequate and the left heart was adequately visualized. There was no evidence of right to left shunting during spontaneous respiration or   following release of Valsalva.   Cardiac testing personally reviewed:  Twelve-lead ECG today shows:  Atrial paced ventricular sensed rhythm with QTC of 427 ms.  Device check shows 88% a paced and less than 0.1% V paced.  A. fib burden less than 0.1% and ventricular response greater than or equal to 120 approximately 35% of the time when in atrial fibrillation.  Some high V rates and EGMs show atrial tachycardia.  Leads stable and battery okay.         Problem List:  Patient Active Problem List   Diagnosis     Acute anterior circulation TIA     Benign essential hypertension     Benign essential tremor     Labile hypertension     Idiosyncratic drug effect     History of stroke     PAF (paroxysmal atrial fibrillation) (H)     Cardiac pacemaker in situ--dual chamber Medtronic for SND-SSS     Major depressive disorder in partial remission (H)     Osteopenia     Personal history of colonic polyps     Scoliosis     Personal history of malignant neoplasm of breast     Revi  e  Physical Examination Review of Systems   w fystems  There were no  vitals taken for this visit.  There is no height or weight on file to calculate BMI.  Wt Readings from Last 3 Encounters:   02/17/22 84.1 kg (185 lb 4.8 oz)   01/13/22 83.5 kg (184 lb)   04/22/21 81.6 kg (179 lb 12.8 oz)     General Appearance:   Alert, well-appearing and in no acute distress.   HEENT: Atraumatic, normocephalic.  No scleral icterus, normal conjunctivae; mucous membranes pink and moist.     Chest: Chest symmetric, spine straight.   Lungs:   Respirations unlabored.   Cardiovascular:    Regular, regular.   Normal JVD, no edema.       Extremities: No cyanosis or clubbing   Musculoskeletal: Moves all extremities   Skin: Warm, dry, intact.    Neurologic: Mood and affect are appropriate, alert and oriented to person, place, time, and situation     ROS: 10 point ROS neg other than the symptoms noted above in the HPI.     Medical History  Surgical History Family History Social History     Past Medical History:   Diagnosis Date     Breast cancer (H) 2007     Hypertension      Stroke (H)     Past Surgical History:   Procedure Laterality Date     ABDOMEN SURGERY       BACK SURGERY       BIOPSY BREAST Right 2008 2011, 2012     BIOPSY BREAST Left 2007     EP PACEMAKER INSERT N/A 9/14/2020    Procedure: EP Pacemaker Insertion;  Surgeon: Cl Massey MD;  Location: [facility];  Service: Cardiology     LUMPECTOMY BREAST Left 2007     MASTECTOMY Left 2007    Family History   Problem Relation Age of Onset     Breast Cancer Mother 80.00    History   Smoking Status     Never Smoker   Smokeless Tobacco     Never Used     Social History    Substance and Sexual Activity      Alcohol use: Never       Medications  Allergies     Current Outpatient Medications   Medication Sig Dispense Refill     acetaminophen (TYLENOL) 325 MG tablet [ACETAMINOPHEN (TYLENOL) 325 MG TABLET] Take 1 tablet (325 mg total) by mouth every 4 (four) hours as needed.  0     aspirin 81 MG EC tablet [ASPIRIN 81 MG EC TABLET] Take 1  tablet (81 mg total) by mouth daily.  0     atorvastatin (LIPITOR) 10 MG tablet TAKE 1 TABLET(10 MG) BY MOUTH AT BEDTIME 90 tablet 2     ELIQUIS ANTICOAGULANT 5 MG tablet TAKE 1 TABLET BY MOUTH TWICE DAILY 180 tablet 1     famotidine (PEPCID) 20 MG tablet [FAMOTIDINE (PEPCID) 20 MG TABLET] Take 1 tablet (20 mg total) by mouth daily. (for stomach acid)  0     lisinopril (ZESTRIL) 2.5 MG tablet TAKE 1 TABLET(2.5 MG) BY MOUTH TWICE DAILY 180 tablet 1     multivitamin Chew [MULTIVITAMIN CHEW] Chew 2 tablets daily.        sertraline (ZOLOFT) 50 MG tablet Take 1 tablet (50 mg) by mouth daily 90 tablet 3     sotalol (BETAPACE) 80 MG tablet TAKE 1 TABLET(80 MG) BY MOUTH EVERY 12 HOURS 180 tablet 1      Allergies   Allergen Reactions     Essential Oils [External Allergen Needs Reconciliation - See Comment] Shortness Of Breath, Cough and Headache     Hydralazine Nausea and Vomiting and Headache     Possibly. 12 hrs of Nausea and headache after one 10 mg dose     Adhesive Tape-Silicones [Adhesive Tape] Unknown     Bandaids     Codeine Unknown     Darvon [Propoxyphene] Unknown     Latex Unknown     Monistat 1 (Tioconazole) [Tioconazole] Unknown     Neurontin [Gabapentin] Unknown     Oxycodone Unknown     Paxil [Paroxetine] Unknown     Vicodin [Hydrocodone-Acetaminophen] Unknown     Citalopram Anxiety     Paroxetine Anxiety      Medical, surgical, family, social history, and medications were all reviewed and updated as necessary.   Lab Results    Chemistry/lipid CBC Cardiac Enzymes/BNP/TSH/INR   Recent Labs   Lab Test 09/06/19  0553   CHOL 171   HDL 45*   LDL 96   TRIG 149     Recent Labs   Lab Test 09/06/19  0553   LDL 96     Recent Labs   Lab Test 01/13/22  1517      POTASSIUM 4.8   CHLORIDE 101   CO2 25   GLC 87   BUN 17   CR 0.86   GFRESTIMATED 69   BECKY 9.7     Recent Labs   Lab Test 01/13/22  1517 09/12/20  0532 09/11/20  2351   CR 0.86 0.84 0.78     Recent Labs   Lab Test 09/06/19  0553   A1C 5.8          Recent Labs    Lab Test 01/13/22  1518   WBC 7.6   HGB 13.9   HCT 43.5   MCV 93        Recent Labs   Lab Test 01/13/22  1518 09/12/20  0532 09/11/20  2351   HGB 13.9 12.5 12.7    Recent Labs   Lab Test 09/11/20  2351 09/10/20  0028   TROPONINI <0.01 <0.01     No results for input(s): BNP, NTBNPI, NTBNP in the last 30521 hours.  Recent Labs   Lab Test 01/13/22  1517   TSH 1.09     Recent Labs   Lab Test 09/10/20  0028 09/05/19  1957   INR 1.05 1.08          Total Time- 40 minutes spent on date of encounter doing chart review, history and exam, documentation and further activities as noted above.  This note has been dictated using voice recognition software. Any grammatical, typographical, or context distortions are unintentional and inherent to the software.

## 2022-04-20 NOTE — LETTER
4/20/2022    RICKEY SARKAR MD  [address] [place] [address]    RE: Abiola Hallman       Dear Colleague,     I had the pleasure of seeing Abiola Hallman in the [facility].    Thank you, Dr. Sarkar, for asking the [facility] team to see Ms. Abiola Hallman to evaluate paroxysmal atrial fibrillation.    Assessment/Recommendations     Assessment/Plan:    Diagnoses and all orders for this visit:  PAF (paroxysmal atrial fibrillation) (H) and A. fib burden on device less than 0.1%.  Unclear to me that she has any symptoms with atrial fibrillation but she has A. fib with RVR.   ms on twelve-lead ECG today.  I recommended to continue low-dose sotalol as no side effects on medication and controlling her atrial fibs well.  Patient concurs.  She had TIA prior to knowledge of atrial fibrillation and was not on anticoagulation.  To stop aspirin.  Discussed increased risk of GI bleeding on combination of dual therapy if not needed.  She denies any other reason for aspirin on questioning.  Cardiac pacemaker in situ--dual chamber Medtronic for SND-SSS and device functioning appropriately.  Benign essential hypertension and blood pressure well below goal.      [id number] score of 6 and on Eliquis.  Eliquis is affordable for them.  Answered multiple questions regarding DOAC's.  If Xarelto were cheaper for them okay to switch to this.  She has no interest in switching to warfarin.  Follow up in clinic with device check and see me in 1 year.  To cancel appointment with Dr. Mesa and okay with following with me in place of Dr. Massey.     History of Present Illness/Subjective     Abiola Hallman is a very pleasant 76 year old female who comes in today for EP paroxysmal atrial fibrillation and had device check prior to visit.  Abiola Hallman has a known history of paroxysmal atrial fibrillation and presented with A. fib with RVR and started on metoprolol 25 mg daily and then returned several  days later with symptomatic bradycardia with heart rate in the 40s so she got a permanent pacemaker in September 2020.  She was subsequently started on sotalol for A. fib.  Abiola has had a lot of medical issues throughout her lifetime.  She has scoliosis and had significant back surgery.  She also had breast cancer and in remission.  She is currently had 3 eye surgeries.  She denies any cardiac symptoms on questioning.  Her activity is mainly limited by her diminished vision.  She is accompanied by her .      Cardiographics (reviewed):  September 2019 echo shows:  Narrative & Impression  1. Normal left ventricular size and systolic performance with a visually estimated ejection fraction of 60-65%.   2. No significant valvular heart disease is identified on this study.   3. Normal right ventricular size and systolic performance.   4. Echo contrast examination was performed using agitated NS as contrast agent. The right heart opacity was adequate and the left heart was adequately visualized. There was no evidence of right to left shunting during spontaneous respiration or   following release of Valsalva.   Cardiac testing personally reviewed:  Twelve-lead ECG today shows:  Atrial paced ventricular sensed rhythm with QTC of 427 ms.  Device check shows 88% a paced and less than 0.1% V paced.  A. fib burden less than 0.1% and ventricular response greater than or equal to 120 approximately 35% of the time when in atrial fibrillation.  Some high V rates and EGMs show atrial tachycardia.  Leads stable and battery okay.         Problem List:  Patient Active Problem List   Diagnosis     Acute anterior circulation TIA     Benign essential hypertension     Benign essential tremor     Labile hypertension     Idiosyncratic drug effect     History of stroke     PAF (paroxysmal atrial fibrillation) (H)     Cardiac pacemaker in situ--dual chamber Medtronic for SND-SSS     Major depressive disorder in partial remission (H)      Osteopenia     Personal history of colonic polyps     Scoliosis     Personal history of malignant neoplasm of breast     Revi  e  Physical Examination Review of Systems   w stems  There were no vitals taken for this visit.  There is no height or weight on file to calculate BMI.  Wt Readings from Last 3 Encounters:   02/17/22 84.1 kg (185 lb 4.8 oz)   01/13/22 83.5 kg (184 lb)   04/22/21 81.6 kg (179 lb 12.8 oz)     General Appearance:   Alert, well-appearing and in no acute distress.   HEENT: Atraumatic, normocephalic.  No scleral icterus, normal conjunctivae; mucous membranes pink and moist.     Chest: Chest symmetric, spine straight.   Lungs:   Respirations unlabored.   Cardiovascular:    Regular, regular.   Normal JVD, no edema.       Extremities: No cyanosis or clubbing   Musculoskeletal: Moves all extremities   Skin: Warm, dry, intact.    Neurologic: Mood and affect are appropriate, alert and oriented to person, place, time, and situation     ROS: 10 point ROS neg other than the symptoms noted above in the HPI.     Medical History  Surgical History Family History Social History     Past Medical History:   Diagnosis Date     Breast cancer (H) 2007     Hypertension      Stroke (H)     Past Surgical History:   Procedure Laterality Date     ABDOMEN SURGERY       BACK SURGERY       BIOPSY BREAST Right 2008 2011, 2012     BIOPSY BREAST Left 2007     EP PACEMAKER INSERT N/A 9/14/2020    Procedure: EP Pacemaker Insertion;  Surgeon: Cl Massey MD;  Location: [facility];  Service: Cardiology     LUMPECTOMY BREAST Left 2007     MASTECTOMY Left 2007    Family History   Problem Relation Age of Onset     Breast Cancer Mother 80.00    History   Smoking Status     Never Smoker   Smokeless Tobacco     Never Used     Social History    Substance and Sexual Activity      Alcohol use: Never       Medications  Allergies     Current Outpatient Medications   Medication Sig Dispense Refill     acetaminophen  (TYLENOL) 325 MG tablet [ACETAMINOPHEN (TYLENOL) 325 MG TABLET] Take 1 tablet (325 mg total) by mouth every 4 (four) hours as needed.  0     aspirin 81 MG EC tablet [ASPIRIN 81 MG EC TABLET] Take 1 tablet (81 mg total) by mouth daily.  0     atorvastatin (LIPITOR) 10 MG tablet TAKE 1 TABLET(10 MG) BY MOUTH AT BEDTIME 90 tablet 2     ELIQUIS ANTICOAGULANT 5 MG tablet TAKE 1 TABLET BY MOUTH TWICE DAILY 180 tablet 1     famotidine (PEPCID) 20 MG tablet [FAMOTIDINE (PEPCID) 20 MG TABLET] Take 1 tablet (20 mg total) by mouth daily. (for stomach acid)  0     lisinopril (ZESTRIL) 2.5 MG tablet TAKE 1 TABLET(2.5 MG) BY MOUTH TWICE DAILY 180 tablet 1     multivitamin Chew [MULTIVITAMIN CHEW] Chew 2 tablets daily.        sertraline (ZOLOFT) 50 MG tablet Take 1 tablet (50 mg) by mouth daily 90 tablet 3     sotalol (BETAPACE) 80 MG tablet TAKE 1 TABLET(80 MG) BY MOUTH EVERY 12 HOURS 180 tablet 1      Allergies   Allergen Reactions     Essential Oils [External Allergen Needs Reconciliation - See Comment] Shortness Of Breath, Cough and Headache     Hydralazine Nausea and Vomiting and Headache     Possibly. 12 hrs of Nausea and headache after one 10 mg dose     Adhesive Tape-Silicones [Adhesive Tape] Unknown     Bandaids     Codeine Unknown     Darvon [Propoxyphene] Unknown     Latex Unknown     Monistat 1 (Tioconazole) [Tioconazole] Unknown     Neurontin [Gabapentin] Unknown     Oxycodone Unknown     Paxil [Paroxetine] Unknown     Vicodin [Hydrocodone-Acetaminophen] Unknown     Citalopram Anxiety     Paroxetine Anxiety      Medical, surgical, family, social history, and medications were all reviewed and updated as necessary.   Lab Results    Chemistry/lipid CBC Cardiac Enzymes/BNP/TSH/INR   Recent Labs   Lab Test 09/06/19  0553   CHOL 171   HDL 45*   LDL 96   TRIG 149     Recent Labs   Lab Test 09/06/19  0553   LDL 96     Recent Labs   Lab Test 01/13/22  1517      POTASSIUM 4.8   CHLORIDE 101   CO2 25   GLC 87   BUN 17   CR  0.86   GFRESTIMATED 69   BECKY 9.7     Recent Labs   Lab Test 01/13/22  1517 09/12/20  0532 09/11/20  2351   CR 0.86 0.84 0.78     Recent Labs   Lab Test 09/06/19  0553   A1C 5.8          Recent Labs   Lab Test 01/13/22  1518   WBC 7.6   HGB 13.9   HCT 43.5   MCV 93        Recent Labs   Lab Test 01/13/22  1518 09/12/20  0532 09/11/20  2351   HGB 13.9 12.5 12.7    Recent Labs   Lab Test 09/11/20  2351 09/10/20  0028   TROPONINI <0.01 <0.01     No results for input(s): BNP, NTBNPI, NTBNP in the last 21626 hours.  Recent Labs   Lab Test 01/13/22  1517   TSH 1.09     Recent Labs   Lab Test 09/10/20  0028 09/05/19  1957   INR 1.05 1.08          Total Time- 40 minutes spent on date of encounter doing chart review, history and exam, documentation and further activities as noted above.  This note has been dictated using voice recognition software. Any grammatical, typographical, or context distortions are unintentional and inherent to the software.      Thank you for allowing me to participate in the care of your patient.      Sincerely,     RUDOLPH Menezes [facility]  cc:   Cl Massey MD  [address] [address]         Rituxan Counseling:  I discussed with the patient the risks of Rituxan infusions. Side effects can include infusion reactions, severe drug rashes including mucocutaneous reactions, reactivation of latent hepatitis and other infections and rarely progressive multifocal leukoencephalopathy.  All of the patient's questions and concerns were addressed.

## 2022-06-24 ENCOUNTER — ANCILLARY PROCEDURE (OUTPATIENT)
Dept: MAMMOGRAPHY | Facility: CLINIC | Age: 77
End: 2022-06-24
Attending: INTERNAL MEDICINE
Payer: COMMERCIAL

## 2022-06-24 DIAGNOSIS — Z12.31 VISIT FOR SCREENING MAMMOGRAM: ICD-10-CM

## 2022-06-24 PROCEDURE — 77063 BREAST TOMOSYNTHESIS BI: CPT | Mod: 52

## 2022-07-14 ENCOUNTER — TRANSFERRED RECORDS (OUTPATIENT)
Dept: HEALTH INFORMATION MANAGEMENT | Facility: CLINIC | Age: 77
End: 2022-07-14

## 2022-07-29 ENCOUNTER — ANCILLARY PROCEDURE (OUTPATIENT)
Dept: CARDIOLOGY | Facility: CLINIC | Age: 77
End: 2022-07-29
Attending: INTERNAL MEDICINE
Payer: COMMERCIAL

## 2022-07-29 DIAGNOSIS — I49.5 SICK SINUS SYNDROME (H): ICD-10-CM

## 2022-07-29 DIAGNOSIS — Z95.0 PACEMAKER: ICD-10-CM

## 2022-07-29 LAB
MDC_IDC_EPISODE_DTM: NORMAL
MDC_IDC_EPISODE_DURATION: NORMAL S
MDC_IDC_EPISODE_ID: 3
MDC_IDC_EPISODE_TYPE: NORMAL
MDC_IDC_LEAD_IMPLANT_DT: NORMAL
MDC_IDC_LEAD_IMPLANT_DT: NORMAL
MDC_IDC_LEAD_LOCATION: NORMAL
MDC_IDC_LEAD_LOCATION: NORMAL
MDC_IDC_LEAD_LOCATION_DETAIL_1: NORMAL
MDC_IDC_LEAD_LOCATION_DETAIL_1: NORMAL
MDC_IDC_LEAD_MFG: NORMAL
MDC_IDC_LEAD_MFG: NORMAL
MDC_IDC_LEAD_MODEL: NORMAL
MDC_IDC_LEAD_MODEL: NORMAL
MDC_IDC_LEAD_POLARITY_TYPE: NORMAL
MDC_IDC_LEAD_POLARITY_TYPE: NORMAL
MDC_IDC_LEAD_SERIAL: NORMAL
MDC_IDC_LEAD_SERIAL: NORMAL
MDC_IDC_LEAD_SPECIAL_FUNCTION: NORMAL
MDC_IDC_LEAD_SPECIAL_FUNCTION: NORMAL
MDC_IDC_MSMT_BATTERY_DTM: NORMAL
MDC_IDC_MSMT_BATTERY_REMAINING_LONGEVITY: 139 MO
MDC_IDC_MSMT_BATTERY_RRT_TRIGGER: 2.62
MDC_IDC_MSMT_BATTERY_STATUS: NORMAL
MDC_IDC_MSMT_BATTERY_VOLTAGE: 3.03 V
MDC_IDC_MSMT_LEADCHNL_RA_IMPEDANCE_VALUE: 361 OHM
MDC_IDC_MSMT_LEADCHNL_RA_IMPEDANCE_VALUE: 437 OHM
MDC_IDC_MSMT_LEADCHNL_RA_PACING_THRESHOLD_AMPLITUDE: 0.5 V
MDC_IDC_MSMT_LEADCHNL_RA_PACING_THRESHOLD_PULSEWIDTH: 0.4 MS
MDC_IDC_MSMT_LEADCHNL_RA_SENSING_INTR_AMPL: 2.62 MV
MDC_IDC_MSMT_LEADCHNL_RA_SENSING_INTR_AMPL: 2.62 MV
MDC_IDC_MSMT_LEADCHNL_RV_IMPEDANCE_VALUE: 399 OHM
MDC_IDC_MSMT_LEADCHNL_RV_IMPEDANCE_VALUE: 475 OHM
MDC_IDC_MSMT_LEADCHNL_RV_PACING_THRESHOLD_AMPLITUDE: 0.5 V
MDC_IDC_MSMT_LEADCHNL_RV_PACING_THRESHOLD_PULSEWIDTH: 0.4 MS
MDC_IDC_MSMT_LEADCHNL_RV_SENSING_INTR_AMPL: 4.75 MV
MDC_IDC_MSMT_LEADCHNL_RV_SENSING_INTR_AMPL: 4.75 MV
MDC_IDC_PG_IMPLANT_DTM: NORMAL
MDC_IDC_PG_MFG: NORMAL
MDC_IDC_PG_MODEL: NORMAL
MDC_IDC_PG_SERIAL: NORMAL
MDC_IDC_PG_TYPE: NORMAL
MDC_IDC_SESS_CLINIC_NAME: NORMAL
MDC_IDC_SESS_DTM: NORMAL
MDC_IDC_SESS_TYPE: NORMAL
MDC_IDC_SET_BRADY_AT_MODE_SWITCH_RATE: 171 {BEATS}/MIN
MDC_IDC_SET_BRADY_HYSTRATE: NORMAL
MDC_IDC_SET_BRADY_LOWRATE: 70 {BEATS}/MIN
MDC_IDC_SET_BRADY_MAX_SENSOR_RATE: 130 {BEATS}/MIN
MDC_IDC_SET_BRADY_MAX_TRACKING_RATE: 130 {BEATS}/MIN
MDC_IDC_SET_BRADY_MODE: NORMAL
MDC_IDC_SET_BRADY_PAV_DELAY_LOW: 180 MS
MDC_IDC_SET_BRADY_SAV_DELAY_LOW: 150 MS
MDC_IDC_SET_LEADCHNL_RA_PACING_AMPLITUDE: 1.5 V
MDC_IDC_SET_LEADCHNL_RA_PACING_ANODE_ELECTRODE_1: NORMAL
MDC_IDC_SET_LEADCHNL_RA_PACING_ANODE_LOCATION_1: NORMAL
MDC_IDC_SET_LEADCHNL_RA_PACING_CAPTURE_MODE: NORMAL
MDC_IDC_SET_LEADCHNL_RA_PACING_CATHODE_ELECTRODE_1: NORMAL
MDC_IDC_SET_LEADCHNL_RA_PACING_CATHODE_LOCATION_1: NORMAL
MDC_IDC_SET_LEADCHNL_RA_PACING_POLARITY: NORMAL
MDC_IDC_SET_LEADCHNL_RA_PACING_PULSEWIDTH: 0.4 MS
MDC_IDC_SET_LEADCHNL_RA_SENSING_ANODE_ELECTRODE_1: NORMAL
MDC_IDC_SET_LEADCHNL_RA_SENSING_ANODE_LOCATION_1: NORMAL
MDC_IDC_SET_LEADCHNL_RA_SENSING_CATHODE_ELECTRODE_1: NORMAL
MDC_IDC_SET_LEADCHNL_RA_SENSING_CATHODE_LOCATION_1: NORMAL
MDC_IDC_SET_LEADCHNL_RA_SENSING_POLARITY: NORMAL
MDC_IDC_SET_LEADCHNL_RA_SENSING_SENSITIVITY: 0.45 MV
MDC_IDC_SET_LEADCHNL_RV_PACING_AMPLITUDE: 1.5 V
MDC_IDC_SET_LEADCHNL_RV_PACING_ANODE_ELECTRODE_1: NORMAL
MDC_IDC_SET_LEADCHNL_RV_PACING_ANODE_LOCATION_1: NORMAL
MDC_IDC_SET_LEADCHNL_RV_PACING_CAPTURE_MODE: NORMAL
MDC_IDC_SET_LEADCHNL_RV_PACING_CATHODE_ELECTRODE_1: NORMAL
MDC_IDC_SET_LEADCHNL_RV_PACING_CATHODE_LOCATION_1: NORMAL
MDC_IDC_SET_LEADCHNL_RV_PACING_POLARITY: NORMAL
MDC_IDC_SET_LEADCHNL_RV_PACING_PULSEWIDTH: 0.4 MS
MDC_IDC_SET_LEADCHNL_RV_SENSING_ANODE_ELECTRODE_1: NORMAL
MDC_IDC_SET_LEADCHNL_RV_SENSING_ANODE_LOCATION_1: NORMAL
MDC_IDC_SET_LEADCHNL_RV_SENSING_CATHODE_ELECTRODE_1: NORMAL
MDC_IDC_SET_LEADCHNL_RV_SENSING_CATHODE_LOCATION_1: NORMAL
MDC_IDC_SET_LEADCHNL_RV_SENSING_POLARITY: NORMAL
MDC_IDC_SET_LEADCHNL_RV_SENSING_SENSITIVITY: 0.9 MV
MDC_IDC_SET_ZONE_DETECTION_INTERVAL: 200 MS
MDC_IDC_SET_ZONE_DETECTION_INTERVAL: 350 MS
MDC_IDC_SET_ZONE_DETECTION_INTERVAL: 400 MS
MDC_IDC_SET_ZONE_TYPE: NORMAL
MDC_IDC_STAT_AT_BURDEN_PERCENT: 0.1 %
MDC_IDC_STAT_AT_DTM_END: NORMAL
MDC_IDC_STAT_AT_DTM_START: NORMAL
MDC_IDC_STAT_BRADY_AP_VP_PERCENT: 0.02 %
MDC_IDC_STAT_BRADY_AP_VS_PERCENT: 88.7 %
MDC_IDC_STAT_BRADY_AS_VP_PERCENT: 0.01 %
MDC_IDC_STAT_BRADY_AS_VS_PERCENT: 11.28 %
MDC_IDC_STAT_BRADY_DTM_END: NORMAL
MDC_IDC_STAT_BRADY_DTM_START: NORMAL
MDC_IDC_STAT_BRADY_RA_PERCENT_PACED: 87.5 %
MDC_IDC_STAT_BRADY_RV_PERCENT_PACED: 0.04 %
MDC_IDC_STAT_EPISODE_RECENT_COUNT: 0
MDC_IDC_STAT_EPISODE_RECENT_COUNT: 1
MDC_IDC_STAT_EPISODE_RECENT_COUNT_DTM_END: NORMAL
MDC_IDC_STAT_EPISODE_RECENT_COUNT_DTM_START: NORMAL
MDC_IDC_STAT_EPISODE_TOTAL_COUNT: 0
MDC_IDC_STAT_EPISODE_TOTAL_COUNT: 1
MDC_IDC_STAT_EPISODE_TOTAL_COUNT: 2
MDC_IDC_STAT_EPISODE_TOTAL_COUNT_DTM_END: NORMAL
MDC_IDC_STAT_EPISODE_TOTAL_COUNT_DTM_START: NORMAL
MDC_IDC_STAT_EPISODE_TYPE: NORMAL

## 2022-07-29 PROCEDURE — 93296 REM INTERROG EVL PM/IDS: CPT | Performed by: INTERNAL MEDICINE

## 2022-07-29 PROCEDURE — 93294 REM INTERROG EVL PM/LDLS PM: CPT | Performed by: INTERNAL MEDICINE

## 2022-07-30 ENCOUNTER — HEALTH MAINTENANCE LETTER (OUTPATIENT)
Age: 77
End: 2022-07-30

## 2022-10-10 ENCOUNTER — HEALTH MAINTENANCE LETTER (OUTPATIENT)
Age: 77
End: 2022-10-10

## 2022-11-04 ENCOUNTER — ANCILLARY PROCEDURE (OUTPATIENT)
Dept: CARDIOLOGY | Facility: CLINIC | Age: 77
End: 2022-11-04
Attending: INTERNAL MEDICINE
Payer: COMMERCIAL

## 2022-11-04 DIAGNOSIS — Z95.0 PACEMAKER: ICD-10-CM

## 2022-11-04 DIAGNOSIS — I49.5 SICK SINUS SYNDROME (H): ICD-10-CM

## 2022-11-04 PROCEDURE — 93294 REM INTERROG EVL PM/LDLS PM: CPT | Performed by: INTERNAL MEDICINE

## 2022-11-04 PROCEDURE — 93296 REM INTERROG EVL PM/IDS: CPT | Performed by: INTERNAL MEDICINE

## 2022-11-26 LAB
MDC_IDC_LEAD_IMPLANT_DT: NORMAL
MDC_IDC_LEAD_IMPLANT_DT: NORMAL
MDC_IDC_LEAD_LOCATION: NORMAL
MDC_IDC_LEAD_LOCATION: NORMAL
MDC_IDC_LEAD_LOCATION_DETAIL_1: NORMAL
MDC_IDC_LEAD_LOCATION_DETAIL_1: NORMAL
MDC_IDC_LEAD_MFG: NORMAL
MDC_IDC_LEAD_MFG: NORMAL
MDC_IDC_LEAD_MODEL: NORMAL
MDC_IDC_LEAD_MODEL: NORMAL
MDC_IDC_LEAD_POLARITY_TYPE: NORMAL
MDC_IDC_LEAD_POLARITY_TYPE: NORMAL
MDC_IDC_LEAD_SERIAL: NORMAL
MDC_IDC_LEAD_SERIAL: NORMAL
MDC_IDC_LEAD_SPECIAL_FUNCTION: NORMAL
MDC_IDC_LEAD_SPECIAL_FUNCTION: NORMAL
MDC_IDC_MSMT_BATTERY_DTM: NORMAL
MDC_IDC_MSMT_BATTERY_REMAINING_LONGEVITY: 138 MO
MDC_IDC_MSMT_BATTERY_RRT_TRIGGER: 2.62
MDC_IDC_MSMT_BATTERY_STATUS: NORMAL
MDC_IDC_MSMT_BATTERY_VOLTAGE: 3.03 V
MDC_IDC_MSMT_LEADCHNL_RA_IMPEDANCE_VALUE: 342 OHM
MDC_IDC_MSMT_LEADCHNL_RA_IMPEDANCE_VALUE: 494 OHM
MDC_IDC_MSMT_LEADCHNL_RA_PACING_THRESHOLD_AMPLITUDE: 0.62 V
MDC_IDC_MSMT_LEADCHNL_RA_PACING_THRESHOLD_PULSEWIDTH: 0.4 MS
MDC_IDC_MSMT_LEADCHNL_RA_SENSING_INTR_AMPL: 2.5 MV
MDC_IDC_MSMT_LEADCHNL_RA_SENSING_INTR_AMPL: 2.5 MV
MDC_IDC_MSMT_LEADCHNL_RV_IMPEDANCE_VALUE: 380 OHM
MDC_IDC_MSMT_LEADCHNL_RV_IMPEDANCE_VALUE: 665 OHM
MDC_IDC_MSMT_LEADCHNL_RV_PACING_THRESHOLD_AMPLITUDE: 0.75 V
MDC_IDC_MSMT_LEADCHNL_RV_PACING_THRESHOLD_PULSEWIDTH: 0.4 MS
MDC_IDC_MSMT_LEADCHNL_RV_SENSING_INTR_AMPL: 8.88 MV
MDC_IDC_MSMT_LEADCHNL_RV_SENSING_INTR_AMPL: 8.88 MV
MDC_IDC_PG_IMPLANT_DTM: NORMAL
MDC_IDC_PG_MFG: NORMAL
MDC_IDC_PG_MODEL: NORMAL
MDC_IDC_PG_SERIAL: NORMAL
MDC_IDC_PG_TYPE: NORMAL
MDC_IDC_SESS_CLINIC_NAME: NORMAL
MDC_IDC_SESS_DTM: NORMAL
MDC_IDC_SESS_TYPE: NORMAL
MDC_IDC_SET_BRADY_AT_MODE_SWITCH_RATE: 171 {BEATS}/MIN
MDC_IDC_SET_BRADY_HYSTRATE: NORMAL
MDC_IDC_SET_BRADY_LOWRATE: 70 {BEATS}/MIN
MDC_IDC_SET_BRADY_MAX_SENSOR_RATE: 130 {BEATS}/MIN
MDC_IDC_SET_BRADY_MAX_TRACKING_RATE: 130 {BEATS}/MIN
MDC_IDC_SET_BRADY_MODE: NORMAL
MDC_IDC_SET_BRADY_PAV_DELAY_LOW: 180 MS
MDC_IDC_SET_BRADY_SAV_DELAY_LOW: 150 MS
MDC_IDC_SET_LEADCHNL_RA_PACING_AMPLITUDE: 1.5 V
MDC_IDC_SET_LEADCHNL_RA_PACING_ANODE_ELECTRODE_1: NORMAL
MDC_IDC_SET_LEADCHNL_RA_PACING_ANODE_LOCATION_1: NORMAL
MDC_IDC_SET_LEADCHNL_RA_PACING_CAPTURE_MODE: NORMAL
MDC_IDC_SET_LEADCHNL_RA_PACING_CATHODE_ELECTRODE_1: NORMAL
MDC_IDC_SET_LEADCHNL_RA_PACING_CATHODE_LOCATION_1: NORMAL
MDC_IDC_SET_LEADCHNL_RA_PACING_POLARITY: NORMAL
MDC_IDC_SET_LEADCHNL_RA_PACING_PULSEWIDTH: 0.4 MS
MDC_IDC_SET_LEADCHNL_RA_SENSING_ANODE_ELECTRODE_1: NORMAL
MDC_IDC_SET_LEADCHNL_RA_SENSING_ANODE_LOCATION_1: NORMAL
MDC_IDC_SET_LEADCHNL_RA_SENSING_CATHODE_ELECTRODE_1: NORMAL
MDC_IDC_SET_LEADCHNL_RA_SENSING_CATHODE_LOCATION_1: NORMAL
MDC_IDC_SET_LEADCHNL_RA_SENSING_POLARITY: NORMAL
MDC_IDC_SET_LEADCHNL_RA_SENSING_SENSITIVITY: 0.45 MV
MDC_IDC_SET_LEADCHNL_RV_PACING_AMPLITUDE: 1.5 V
MDC_IDC_SET_LEADCHNL_RV_PACING_ANODE_ELECTRODE_1: NORMAL
MDC_IDC_SET_LEADCHNL_RV_PACING_ANODE_LOCATION_1: NORMAL
MDC_IDC_SET_LEADCHNL_RV_PACING_CAPTURE_MODE: NORMAL
MDC_IDC_SET_LEADCHNL_RV_PACING_CATHODE_ELECTRODE_1: NORMAL
MDC_IDC_SET_LEADCHNL_RV_PACING_CATHODE_LOCATION_1: NORMAL
MDC_IDC_SET_LEADCHNL_RV_PACING_POLARITY: NORMAL
MDC_IDC_SET_LEADCHNL_RV_PACING_PULSEWIDTH: 0.4 MS
MDC_IDC_SET_LEADCHNL_RV_SENSING_ANODE_ELECTRODE_1: NORMAL
MDC_IDC_SET_LEADCHNL_RV_SENSING_ANODE_LOCATION_1: NORMAL
MDC_IDC_SET_LEADCHNL_RV_SENSING_CATHODE_ELECTRODE_1: NORMAL
MDC_IDC_SET_LEADCHNL_RV_SENSING_CATHODE_LOCATION_1: NORMAL
MDC_IDC_SET_LEADCHNL_RV_SENSING_POLARITY: NORMAL
MDC_IDC_SET_LEADCHNL_RV_SENSING_SENSITIVITY: 0.9 MV
MDC_IDC_SET_ZONE_DETECTION_INTERVAL: 200 MS
MDC_IDC_SET_ZONE_DETECTION_INTERVAL: 350 MS
MDC_IDC_SET_ZONE_DETECTION_INTERVAL: 400 MS
MDC_IDC_SET_ZONE_TYPE: NORMAL
MDC_IDC_STAT_AT_BURDEN_PERCENT: 0 %
MDC_IDC_STAT_AT_DTM_END: NORMAL
MDC_IDC_STAT_AT_DTM_START: NORMAL
MDC_IDC_STAT_BRADY_AP_VP_PERCENT: 0.03 %
MDC_IDC_STAT_BRADY_AP_VS_PERCENT: 90.61 %
MDC_IDC_STAT_BRADY_AS_VP_PERCENT: 0 %
MDC_IDC_STAT_BRADY_AS_VS_PERCENT: 9.36 %
MDC_IDC_STAT_BRADY_DTM_END: NORMAL
MDC_IDC_STAT_BRADY_DTM_START: NORMAL
MDC_IDC_STAT_BRADY_RA_PERCENT_PACED: 89.73 %
MDC_IDC_STAT_BRADY_RV_PERCENT_PACED: 0.03 %
MDC_IDC_STAT_EPISODE_RECENT_COUNT: 0
MDC_IDC_STAT_EPISODE_RECENT_COUNT_DTM_END: NORMAL
MDC_IDC_STAT_EPISODE_RECENT_COUNT_DTM_START: NORMAL
MDC_IDC_STAT_EPISODE_TOTAL_COUNT: 0
MDC_IDC_STAT_EPISODE_TOTAL_COUNT: 1
MDC_IDC_STAT_EPISODE_TOTAL_COUNT: 2
MDC_IDC_STAT_EPISODE_TOTAL_COUNT_DTM_END: NORMAL
MDC_IDC_STAT_EPISODE_TOTAL_COUNT_DTM_START: NORMAL
MDC_IDC_STAT_EPISODE_TYPE: NORMAL

## 2022-12-16 DIAGNOSIS — R09.89 LABILE HYPERTENSION: ICD-10-CM

## 2022-12-16 RX ORDER — LISINOPRIL 2.5 MG/1
TABLET ORAL
Qty: 180 TABLET | Refills: 1 | Status: SHIPPED | OUTPATIENT
Start: 2022-12-16 | End: 2023-06-08

## 2023-01-06 ENCOUNTER — OFFICE VISIT (OUTPATIENT)
Dept: INTERNAL MEDICINE | Facility: CLINIC | Age: 78
End: 2023-01-06
Payer: COMMERCIAL

## 2023-01-06 VITALS
TEMPERATURE: 98.1 F | SYSTOLIC BLOOD PRESSURE: 126 MMHG | RESPIRATION RATE: 16 BRPM | HEIGHT: 64 IN | WEIGHT: 181 LBS | OXYGEN SATURATION: 99 % | HEART RATE: 81 BPM | BODY MASS INDEX: 30.9 KG/M2 | DIASTOLIC BLOOD PRESSURE: 68 MMHG

## 2023-01-06 DIAGNOSIS — E78.5 HYPERLIPIDEMIA LDL GOAL <70: ICD-10-CM

## 2023-01-06 DIAGNOSIS — Z23 HIGH PRIORITY FOR COVID-19 VACCINATION: ICD-10-CM

## 2023-01-06 DIAGNOSIS — M21.70 LEG LENGTH DIFFERENCE, ACQUIRED: ICD-10-CM

## 2023-01-06 DIAGNOSIS — M41.9 SCOLIOSIS OF LUMBOSACRAL SPINE, UNSPECIFIED SCOLIOSIS TYPE: ICD-10-CM

## 2023-01-06 DIAGNOSIS — Z95.0 CARDIAC PACEMAKER IN SITU: ICD-10-CM

## 2023-01-06 DIAGNOSIS — G25.0 BENIGN ESSENTIAL TREMOR: ICD-10-CM

## 2023-01-06 DIAGNOSIS — I48.0 PAF (PAROXYSMAL ATRIAL FIBRILLATION) (H): ICD-10-CM

## 2023-01-06 DIAGNOSIS — Z00.00 ROUTINE GENERAL MEDICAL EXAMINATION AT A HEALTH CARE FACILITY: Primary | ICD-10-CM

## 2023-01-06 DIAGNOSIS — F32.4 MAJOR DEPRESSIVE DISORDER IN PARTIAL REMISSION, UNSPECIFIED WHETHER RECURRENT (H): ICD-10-CM

## 2023-01-06 DIAGNOSIS — I10 BENIGN ESSENTIAL HYPERTENSION: Chronic | ICD-10-CM

## 2023-01-06 PROCEDURE — 99397 PER PM REEVAL EST PAT 65+ YR: CPT | Performed by: INTERNAL MEDICINE

## 2023-01-06 PROCEDURE — 99213 OFFICE O/P EST LOW 20 MIN: CPT | Mod: 25 | Performed by: INTERNAL MEDICINE

## 2023-01-06 PROCEDURE — 91312 COVID-19 VACCINE BIVALENT BOOSTER 12+ (PFIZER): CPT | Performed by: INTERNAL MEDICINE

## 2023-01-06 PROCEDURE — 0124A COVID-19 VACCINE BIVALENT BOOSTER 12+ (PFIZER): CPT | Performed by: INTERNAL MEDICINE

## 2023-01-06 RX ORDER — PRIMIDONE 50 MG/1
50 TABLET ORAL AT BEDTIME
Qty: 30 TABLET | Refills: 11 | Status: SHIPPED | OUTPATIENT
Start: 2023-01-06 | End: 2023-01-13 | Stop reason: ALTCHOICE

## 2023-01-06 RX ORDER — ATORVASTATIN CALCIUM 10 MG/1
10 TABLET, FILM COATED ORAL AT BEDTIME
Qty: 90 TABLET | Refills: 3 | Status: SHIPPED | OUTPATIENT
Start: 2023-01-06 | End: 2024-02-06

## 2023-01-06 RX ORDER — DIPHENHYDRAMINE HCL 25 MG
25 TABLET ORAL
COMMUNITY
End: 2023-04-20

## 2023-01-06 ASSESSMENT — ENCOUNTER SYMPTOMS
DYSURIA: 0
COUGH: 0
BREAST MASS: 0
CHILLS: 0
HEMATOCHEZIA: 0
FEVER: 0
ARTHRALGIAS: 0
MYALGIAS: 1
WEAKNESS: 0
FREQUENCY: 0
CONSTIPATION: 0
HEMATURIA: 0
NERVOUS/ANXIOUS: 0
SHORTNESS OF BREATH: 0
DIZZINESS: 0
HEARTBURN: 0
DIARRHEA: 0
JOINT SWELLING: 0
NAUSEA: 0
PALPITATIONS: 0
SORE THROAT: 0
PARESTHESIAS: 0
HEADACHES: 0
EYE PAIN: 0
ABDOMINAL PAIN: 0

## 2023-01-06 ASSESSMENT — ACTIVITIES OF DAILY LIVING (ADL): CURRENT_FUNCTION: NO ASSISTANCE NEEDED

## 2023-01-06 ASSESSMENT — PATIENT HEALTH QUESTIONNAIRE - PHQ9
10. IF YOU CHECKED OFF ANY PROBLEMS, HOW DIFFICULT HAVE THESE PROBLEMS MADE IT FOR YOU TO DO YOUR WORK, TAKE CARE OF THINGS AT HOME, OR GET ALONG WITH OTHER PEOPLE: NOT DIFFICULT AT ALL
SUM OF ALL RESPONSES TO PHQ QUESTIONS 1-9: 0
SUM OF ALL RESPONSES TO PHQ QUESTIONS 1-9: 0
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
## 2023-01-09 ENCOUNTER — TELEPHONE (OUTPATIENT)
Dept: CARDIOLOGY | Facility: CLINIC | Age: 78
End: 2023-01-09

## 2023-01-09 DIAGNOSIS — G25.0 BENIGN ESSENTIAL TREMOR: Primary | ICD-10-CM

## 2023-01-09 NOTE — Clinical Note
[facility] Tell her I sent a consultation request to neurology to see what other options there might be for essential tremor   But tell her that neurology is booking out possibly multiple months, so it may be a while before she gets to meet with them.

## 2023-01-11 NOTE — TELEPHONE ENCOUNTER
Angel Glez,    Do you have any information regarding the significance of interaction between mysoline and Eliquis?  Thanks,  Silvia

## 2023-01-11 NOTE — PROGRESS NOTES
Primidone is a strong CY inducer and may decrease serum concentration levels of Eliquis ([employer] drug interaction database).  While it is not an absolute contraindication with Eliquis since it is not a duel gp and strong CY inducer; limited clinical data is available and patient's thrombotic risk should be weighted when considering combined use  ( FORUM rapid resource: (DOAC) Drug-Drug Interaction Guidance-2022-09-23-640696.pdf ([facility]cellence.org)))         [id number] for Abiola is: 6 . If clinical need for primadone is not high, consider avoiding combination if possible. If primidone use is desired, suggest either obtaining one time Eliquis Anti-Xa level to evaluate interaction ([facility] pharmacist can help) or switching anticoagulant to warfarin..     Tenisha Hong (pharmacy student)  -Amaris Sorenson (PharmD)

## 2023-01-12 ENCOUNTER — LAB (OUTPATIENT)
Dept: LAB | Facility: CLINIC | Age: 78
End: 2023-01-12
Payer: COMMERCIAL

## 2023-01-12 DIAGNOSIS — Z00.00 ROUTINE GENERAL MEDICAL EXAMINATION AT A HEALTH CARE FACILITY: ICD-10-CM

## 2023-01-12 DIAGNOSIS — E78.5 HYPERLIPIDEMIA LDL GOAL <70: ICD-10-CM

## 2023-01-12 LAB
ALBUMIN SERPL BCG-MCNC: 4.3 G/DL (ref 3.5–5.2)
ALP SERPL-CCNC: 65 U/L (ref 35–104)
ALT SERPL W P-5'-P-CCNC: 18 U/L (ref 10–35)
ANION GAP SERPL CALCULATED.3IONS-SCNC: 12 MMOL/L (ref 7–15)
AST SERPL W P-5'-P-CCNC: 28 U/L (ref 10–35)
BILIRUB SERPL-MCNC: 0.3 MG/DL
BUN SERPL-MCNC: 18 MG/DL (ref 8–23)
CALCIUM SERPL-MCNC: 9.8 MG/DL (ref 8.8–10.2)
CHLORIDE SERPL-SCNC: 105 MMOL/L (ref 98–107)
CHOLEST SERPL-MCNC: 151 MG/DL
CREAT SERPL-MCNC: 0.92 MG/DL (ref 0.51–0.95)
DEPRECATED HCO3 PLAS-SCNC: 24 MMOL/L (ref 22–29)
ERYTHROCYTE [DISTWIDTH] IN BLOOD BY AUTOMATED COUNT: 13.1 % (ref 10–15)
GFR SERPL CREATININE-BSD FRML MDRD: 63 ML/MIN/1.73M2
GLUCOSE SERPL-MCNC: 99 MG/DL (ref 70–99)
HCT VFR BLD AUTO: 43.6 % (ref 35–47)
HDLC SERPL-MCNC: 58 MG/DL
HGB BLD-MCNC: 13.8 G/DL (ref 11.7–15.7)
LDLC SERPL CALC-MCNC: 72 MG/DL
MCH RBC QN AUTO: 29.5 PG (ref 26.5–33)
MCHC RBC AUTO-ENTMCNC: 31.7 G/DL (ref 31.5–36.5)
MCV RBC AUTO: 93 FL (ref 78–100)
NONHDLC SERPL-MCNC: 93 MG/DL
PLATELET # BLD AUTO: 269 10E3/UL (ref 150–450)
POTASSIUM SERPL-SCNC: 4.4 MMOL/L (ref 3.4–5.3)
PROT SERPL-MCNC: 6.9 G/DL (ref 6.4–8.3)
RBC # BLD AUTO: 4.68 10E6/UL (ref 3.8–5.2)
SODIUM SERPL-SCNC: 141 MMOL/L (ref 136–145)
TRIGL SERPL-MCNC: 106 MG/DL
TSH SERPL DL<=0.005 MIU/L-ACNC: 1.71 UIU/ML (ref 0.3–4.2)
WBC # BLD AUTO: 6.4 10E3/UL (ref 4–11)

## 2023-01-12 PROCEDURE — 80053 COMPREHEN METABOLIC PANEL: CPT

## 2023-01-12 PROCEDURE — 36415 COLL VENOUS BLD VENIPUNCTURE: CPT

## 2023-01-12 PROCEDURE — 84443 ASSAY THYROID STIM HORMONE: CPT

## 2023-01-12 PROCEDURE — 80061 LIPID PANEL: CPT

## 2023-01-12 PROCEDURE — 85027 COMPLETE CBC AUTOMATED: CPT

## 2023-01-13 NOTE — PROGRESS NOTES
Lets drop the primidone idea.  I took it off her medication list.    [facility] team:  Tell her I sent a consultation request to neurology to see what other options there might be for essential tremor    But tell her that neurology is booking out possibly multiple months, so it may be a while before she gets to meet with them.

## 2023-01-18 ENCOUNTER — OFFICE VISIT (OUTPATIENT)
Dept: PODIATRY | Facility: CLINIC | Age: 78
End: 2023-01-18
Attending: INTERNAL MEDICINE
Payer: COMMERCIAL

## 2023-01-18 VITALS — HEART RATE: 83 BPM | HEIGHT: 64 IN | OXYGEN SATURATION: 96 % | WEIGHT: 181 LBS | BODY MASS INDEX: 30.9 KG/M2

## 2023-01-18 DIAGNOSIS — M21.70 ACQUIRED LEG LENGTH DISCREPANCY: Primary | ICD-10-CM

## 2023-01-18 PROCEDURE — 99203 OFFICE O/P NEW LOW 30 MIN: CPT | Performed by: PODIATRIST

## 2023-01-18 ASSESSMENT — PAIN SCALES - GENERAL: PAINLEVEL: NO PAIN (0)

## 2023-01-18 NOTE — PROGRESS NOTES
FOOT AND ANKLE SURGERY/PODIATRY CONSULT NOTE         ASSESSMENT:   Limb length discrepancy      TREATMENT:  I have recommended orthotics with a heel lift for the right side.  The patient is to return to clinic as needed.        HPI: Abiola Hallman presented to the clinic today complaining of a long standing limb length discrepancy history of her right lower limb.  She stated that this developed after she had hip surgery.  She has an old pair of orthotics with a heel lift which works quite well and keeps her pelvis is balanced.  She has very little discomfort of her lower back.  She has had surgery to correct her scoliosis.  She is able to weight-bear and ambulate without any discomfort to her lower extremities.  She has not had any recent trauma to the lower extremities.     Past Medical History:   Diagnosis Date     Breast cancer (H) 2007     Hypertension      Stroke (H)        Social History     Socioeconomic History     Marital status:      Spouse name: Not on file     Number of children: Not on file     Years of education: Not on file     Highest education level: Not on file   Occupational History     Not on file   Tobacco Use     Smoking status: Never     Smokeless tobacco: Never   Substance and Sexual Activity     Alcohol use: Never     Drug use: Never     Sexual activity: Not on file   Other Topics Concern     Not on file   Social History Narrative     Not on file     Social Determinants of Health     Financial Resource Strain: Not on file   Food Insecurity: Not on file   Transportation Needs: Not on file   Physical Activity: Not on file   Stress: Not on file   Social Connections: Not on file   Intimate Partner Violence: Not on file   Housing Stability: Not on file          Allergies   Allergen Reactions     Essential Oils [External Allergen Needs Reconciliation - See Comment] Shortness Of Breath, Cough and Headache     Hydralazine Nausea and Vomiting and Headache     Possibly. 12 hrs of Nausea and  headache after one 10 mg dose     Adhesive Tape-Silicones [Adhesive Tape] Unknown     Bandaids     Codeine Unknown     Darvon [Propoxyphene] Unknown     Latex Unknown     Monistat 1 (Tioconazole) [Tioconazole] Unknown     Neurontin [Gabapentin] Unknown     Oxycodone Unknown     Paxil [Paroxetine] Unknown     Vicodin [Hydrocodone-Acetaminophen] Unknown     Citalopram Anxiety     Paroxetine Anxiety          Current Outpatient Medications:      acetaminophen (TYLENOL) 325 MG tablet, [ACETAMINOPHEN (TYLENOL) 325 MG TABLET] Take 1 tablet (325 mg total) by mouth every 4 (four) hours as needed., Disp: , Rfl: 0     apixaban ANTICOAGULANT (ELIQUIS ANTICOAGULANT) 5 MG tablet, Take 1 tablet (5 mg) by mouth every 12 hours, Disp: 60 tablet, Rfl: 11     atorvastatin (LIPITOR) 10 MG tablet, Take 1 tablet (10 mg) by mouth At Bedtime, Disp: 90 tablet, Rfl: 3     diphenhydrAMINE (BENADRYL) 25 MG tablet, Take 25 mg by mouth nightly as needed for itching or allergies, Disp: , Rfl:      famotidine (PEPCID) 20 MG tablet, [FAMOTIDINE (PEPCID) 20 MG TABLET] Take 1 tablet (20 mg total) by mouth daily. (for stomach acid), Disp: , Rfl: 0     lisinopril (ZESTRIL) 2.5 MG tablet, TAKE 1 TABLET(2.5 MG) BY MOUTH TWICE DAILY, Disp: 180 tablet, Rfl: 1     multivitamin Chew, [MULTIVITAMIN CHEW] Chew 2 tablets daily. , Disp: , Rfl:      sertraline (ZOLOFT) 50 MG tablet, Take 1 tablet (50 mg) by mouth daily, Disp: 90 tablet, Rfl: 3     sotalol (BETAPACE) 80 MG tablet, Take 1 tablet (80 mg) by mouth every 12 hours, Disp: 180 tablet, Rfl: 3     Family History   Problem Relation Age of Onset     Breast Cancer Mother 80.00        Social History     Socioeconomic History     Marital status:      Spouse name: Not on file     Number of children: Not on file     Years of education: Not on file     Highest education level: Not on file   Occupational History     Not on file   Tobacco Use     Smoking status: Never     Smokeless tobacco: Never   Substance  "and Sexual Activity     Alcohol use: Never     Drug use: Never     Sexual activity: Not on file   Other Topics Concern     Not on file   Social History Narrative     Not on file     Social Determinants of Health     Financial Resource Strain: Not on file   Food Insecurity: Not on file   Transportation Needs: Not on file   Physical Activity: Not on file   Stress: Not on file   Social Connections: Not on file   Intimate Partner Violence: Not on file   Housing Stability: Not on file        Review of Systems - Patient denies fever, chills, rash, wound, stiffness, limping, numbness, weakness, heart burn, blood in stool, chest pain with activity, calf pain when walking, shortness of breath with activity, chronic cough, easy bleeding/bruising, swelling of ankles, excessive thirst, fatigue, depression, anxiety.  Patient admits to limb length discrepancy right lower extremity.      OBJECTIVE:  Appearance: alert, well appearing, and in no distress.    Ht 1.626 m (5' 4\")   Wt 82.1 kg (181 lb)   BMI 31.07 kg/m       Body mass index is 31.07 kg/m .     General appearance: Patient is alert and fully cooperative with history & exam.  No sign of distress is noted during the visit.  Psychiatric: Affect is pleasant & appropriate.  Patient appears motivated to improve health.  Respiratory: Breathing is regular & unlabored while sitting.  HEENT: Hearing is intact to spoken word.  Speech is clear.  No gross evidence of visual impairment that would impact ambulation.    Vascular: Dorsalis pedis and posterior tibial pulses are palpable. There is no pedal hair growth bilaterally.  CFT < 3 sec from anterior tibial surface to distal digits bilaterally. There is no appreciable edema noted.  Dermatologic: Turgor and texture are within normal limits. No coloration or temperature changes. No primary or secondary lesions noted.  Neurologic: All epicritic and proprioceptive sensations are grossly intact bilaterally.  Musculoskeletal: All active " and passive ankle, subtalar, midtarsal, and 1st MPJ range of motion are grossly intact without pain or crepitus, with the exception of none. Manual muscle strength is within normal limits bilaterally. All dorsiflexors, plantarflexors, invertors, evertors are intact bilaterally.  No tenderness present to both lower extremities on palpation.  No tenderness to bilateral feet or ankles with range of motion. Calf is soft/non-tender without warmth/induration    Imaging:       No images are attached to the encounter or orders placed in the encounter.     No results found.   No results found.         Oliver Mendoza DPM  [facility] Foot & Ankle Surgery/Podiatry

## 2023-01-18 NOTE — PATIENT INSTRUCTIONS
What are Prescription Custom Orthotics?  Custom orthotics are specially-made devices designed to support and comfort your feet. Prescription orthotics are crafted for you and no one else. They match the contours of your feet precisely and are designed for the way you move. Orthotics are only manufactured after a podiatrist has conducted a complete evaluation of your feet, ankles, and legs, so the orthotic can accommodate your unique foot structure and pathology.  Prescription orthotics are divided into two categories:  Functional orthotics are designed to control abnormal motion. They may be used to treat foot pain caused by abnormal motion; they can also be used to treat injuries such as shin splints or tendinitis. Functional orthotics are usually crafted of a semi-rigid material such as plastic or graphite.  Accommodative orthotics are softer and meant to provide additional cushioning and support. They can be used to treat diabetic foot ulcers, painful calluses on the bottom of the foot, and other uncomfortable conditions.  Podiatrists use orthotics to treat foot problems such as plantar fasciitis, bursitis, tendinitis, diabetic foot ulcers, and foot, ankle, and heel pain. Clinical research studies have shown that podiatrist-prescribed foot orthotics decrease foot pain and improve function.  Orthotics typically cost more than shoe inserts purchased in a retail store, but the additional cost is usually well worth it. Unlike shoe inserts, orthotics are molded to fit each individual foot, so you can be sure that your orthotics fit and do what they're supposed to do. Prescription orthotics are also made of top-notch materials and last many years when cared for properly. Insurance often helps pay for prescription orthotics.  What are Shoe Inserts?   You've seen them at the grocery store and at the mall. You've probably even seen them on TV and online. Shoe inserts are any kind of non-prescription foot support designed  to be worn inside a shoe. Pre-packaged, mass produced, arch supports are shoe inserts. So are the  custom-made  insoles and foot supports that you can order online or at retail stores. Unless the device has been prescribed by a doctor and crafted for your specific foot, it's a shoe insert, not a custom orthotic device--despite what the ads might say.  Shoe inserts can be very helpful for a variety of foot ailments, including flat arches and foot and leg pain. They can cushion your feet, provide comfort, and support your arches, but they can't correct biomechanical foot problems or cure long-standing foot issues.  The most common types of shoe inserts are:  Arch supports: Some people have high arches. Others have low arches or flat feet. Arch supports generally have a  bumped-up  appearance and are designed to support the foot's natural arch.   Insoles: Insoles slip into your shoe to provide extra cushioning and support. Insoles are often made of gel, foam, or plastic.   Heel liners: Heel liners, sometimes called heel pads or heel cups, provide extra cushioning in the heel region. They may be especially useful for patients who have foot pain caused by age-related thinning of the heels' natural fat pads.   Foot cushions: Do your shoes rub against your heel or your toes? Foot cushions come in many different shapes and sizes and can be used as a barrier between you and your shoe.  Choosing an Over-the-Counter Shoe Insert  Selecting a shoe insert from the wide variety of devices on the market can be overwhelming. Here are some podiatrist-tested tips to help you find the insert that best meets your needs:  Consider your health. Do you have diabetes? Problems with circulation? An over-the-counter insert may not be your best bet. Diabetes and poor circulation increase your risk of foot ulcers and infections, so schedule an appointment with a podiatrist. He or she can help you select a solution that won't cause additional  health problems.   Think about the purpose. Are you planning to run a marathon, or do you just need a little arch support in your work shoes? Look for a product that fits your planned level of activity.   Bring your shoes. For the insert to be effective, it has to fit into your shoes. So bring your sneakers, dress shoes, or work boots--whatever you plan to wear with your insert. Look for an insert that will fit the contours of your shoe.   Try them on. If all possible, slip the insert into your shoe and try it out. Walk around a little. How does it feel? Don't assume that feelings of pressure will go away with continued wear. (If you can't try the inserts at the store, ask about the store's return policy and hold on to your receipt.)    Please call one of the [facility] locations below to schedule an appointment. If you received a prescription please bring it with you to your appointment. Some locations are limited to what they carry.    Office Locations    [facility]  [address] [place] [address]  Home Medical Equipment, [address]   Phone: [phone]   Orthotics and Prosthetics, [address]   Phone: [phone]    [facility]  [address]  [address]   [place] [address]   Phone: [phone]    [facility]  [address]  [place] [address]  Phone: [phone]    [facility] Medical [facility]   [address]  [place] [address]  Phone: [phone]    [facility]  [address]  [place] [address]  Phone: [phone]    [facility]  [address]  [place] [address]  Phone: [phone]    [place]   [address] [place] [address]   Phone: [phone]    WEARING YOUR CUSTOM FOOT ORTHOTICS   Most  insurance plans cover one pair of orthotics per year. You must check with your   insurance plan to see what your payment responsibility will be. Please call your   insurance company by calling the number on the back of your insurance card.   Orthotic's are non-refundable and non-returnable.   Orthotics are made of various designs. Some orthotics are covered with material that extends beyond your toes. If your orthotic is of this design, you will likely need to trim the toe end to get a proper fit. The insole from your shoe can be used as a template. Simply overlay the shoe insert on top of the custom orthotic. Align the heel end while tracing the length of the insert onto the custom orthotic. Use a large scissor to trim the toe end until you get a proper fit in the shoe.   The orthotic needs to be pushed as far back in the shoe as possible. The heel portion should not ride forward so as not to irritate your heel.   Orthotics are designed to work with socks. Excessive perspiration will shorten the life span of the orthotics. Remove the orthotic from the shoe frequently for proper drying.   The break-in period lasts for weeks. People new to orthotics will likely experience new aches and pains. The orthotic is forcing your foot into a new position. Arch, foot and leg muscle aches and fatigue are common during these weeks. Minor discomfort can be considered normal break in phenomenon. Start wearing your orthotic around your home your first day. Limited activity for one to two hours is recommended. You can increase one or two additional hours each day provided the aches and pains are subsiding. The degree of discomfort, fatigue and problems will dictate the speed of break in. You may require multiple weeks to work up to full time use.   Do not continue wearing your orthotics if they are creating problems such as blisters or sores. Do not hesitate to call the clinic to speak with a nurse regarding orthotic   break in,  fit, trimming, etc. You may also need to see the doctor if the orthotics are   simply not working out. Adjustments are sometimes made to improve orthotic   function.     Orthotics will only work in certain styles and types of shoes. Orthotics rarely work in dress shoes. Slip-ons, clogs, sandals and heels are particularly troublesome. Specially designed orthotics may be necessary for these types of shoes. Your custom orthotic was designed for activities that require appropriate walking or running shoes. Lace up athletic shoes, walking shoes or work boots should work appropriately. You may need a wider or longer shoe. Shoes with a removable  or insert work best. In general, you want to remove an insert from the shoe before placing the orthotic into the shoe. Shoes without a removable liner may not work as well.     When purchasing new shoes, bring your orthotics along to get a proper fit. Shop at stores that are familiar with orthotics.   Frequent washing of the orthotic may shorten the life span of the top cover. The top cover can be replaced but will generally last one to five years depending on use and foot perspiration.

## 2023-01-18 NOTE — LETTER
1/18/2023         RE: Abiola Hallman  [address] [place] [address]        Dear Colleague,    Thank you for referring your patient, Abiola Hallman, to the [facility]. Please see a copy of my visit note below.    FOOT AND ANKLE SURGERY/PODIATRY CONSULT NOTE         ASSESSMENT:   Limb length discrepancy      TREATMENT:  I have recommended orthotics with a heel lift for the right side.  The patient is to return to clinic as needed.        HPI: Abiola Hallman presented to the clinic today complaining of a long standing limb length discrepancy history of her right lower limb.  She stated that this developed after she had hip surgery.  She has an old pair of orthotics with a heel lift which works quite well and keeps her pelvis is balanced.  She has very little discomfort of her lower back.  She has had surgery to correct her scoliosis.  She is able to weight-bear and ambulate without any discomfort to her lower extremities.  She has not had any recent trauma to the lower extremities.     Past Medical History:   Diagnosis Date     Breast cancer (H) 2007     Hypertension      Stroke (H)        Social History     Socioeconomic History     Marital status:      Spouse name: Not on file     Number of children: Not on file     Years of education: Not on file     Highest education level: Not on file   Occupational History     Not on file   Tobacco Use     Smoking status: Never     Smokeless tobacco: Never   Substance and Sexual Activity     Alcohol use: Never     Drug use: Never     Sexual activity: Not on file   Other Topics Concern     Not on file   Social History Narrative     Not on file     Social Determinants of Health     Financial Resource Strain: Not on file   Food Insecurity: Not on file   Transportation Needs: Not on file   Physical Activity: Not on file   Stress: Not on file   Social Connections: Not on file   Intimate Partner Violence: Not on file   Housing  Stability: Not on file          Allergies   Allergen Reactions     Essential Oils [External Allergen Needs Reconciliation - See Comment] Shortness Of Breath, Cough and Headache     Hydralazine Nausea and Vomiting and Headache     Possibly. 12 hrs of Nausea and headache after one 10 mg dose     Adhesive Tape-Silicones [Adhesive Tape] Unknown     Bandaids     Codeine Unknown     Darvon [Propoxyphene] Unknown     Latex Unknown     Monistat 1 (Tioconazole) [Tioconazole] Unknown     Neurontin [Gabapentin] Unknown     Oxycodone Unknown     Paxil [Paroxetine] Unknown     Vicodin [Hydrocodone-Acetaminophen] Unknown     Citalopram Anxiety     Paroxetine Anxiety          Current Outpatient Medications:      acetaminophen (TYLENOL) 325 MG tablet, [ACETAMINOPHEN (TYLENOL) 325 MG TABLET] Take 1 tablet (325 mg total) by mouth every 4 (four) hours as needed., Disp: , Rfl: 0     apixaban ANTICOAGULANT (ELIQUIS ANTICOAGULANT) 5 MG tablet, Take 1 tablet (5 mg) by mouth every 12 hours, Disp: 60 tablet, Rfl: 11     atorvastatin (LIPITOR) 10 MG tablet, Take 1 tablet (10 mg) by mouth At Bedtime, Disp: 90 tablet, Rfl: 3     diphenhydrAMINE (BENADRYL) 25 MG tablet, Take 25 mg by mouth nightly as needed for itching or allergies, Disp: , Rfl:      famotidine (PEPCID) 20 MG tablet, [FAMOTIDINE (PEPCID) 20 MG TABLET] Take 1 tablet (20 mg total) by mouth daily. (for stomach acid), Disp: , Rfl: 0     lisinopril (ZESTRIL) 2.5 MG tablet, TAKE 1 TABLET(2.5 MG) BY MOUTH TWICE DAILY, Disp: 180 tablet, Rfl: 1     multivitamin Chew, [MULTIVITAMIN CHEW] Chew 2 tablets daily. , Disp: , Rfl:      sertraline (ZOLOFT) 50 MG tablet, Take 1 tablet (50 mg) by mouth daily, Disp: 90 tablet, Rfl: 3     sotalol (BETAPACE) 80 MG tablet, Take 1 tablet (80 mg) by mouth every 12 hours, Disp: 180 tablet, Rfl: 3     Family History   Problem Relation Age of Onset     Breast Cancer Mother 80.00        Social History     Socioeconomic History     Marital status:      " Spouse name: Not on file     Number of children: Not on file     Years of education: Not on file     Highest education level: Not on file   Occupational History     Not on file   Tobacco Use     Smoking status: Never     Smokeless tobacco: Never   Substance and Sexual Activity     Alcohol use: Never     Drug use: Never     Sexual activity: Not on file   Other Topics Concern     Not on file   Social History Narrative     Not on file     Social Determinants of Health     Financial Resource Strain: Not on file   Food Insecurity: Not on file   Transportation Needs: Not on file   Physical Activity: Not on file   Stress: Not on file   Social Connections: Not on file   Intimate Partner Violence: Not on file   Housing Stability: Not on file        Review of Systems - Patient denies fever, chills, rash, wound, stiffness, limping, numbness, weakness, heart burn, blood in stool, chest pain with activity, calf pain when walking, shortness of breath with activity, chronic cough, easy bleeding/bruising, swelling of ankles, excessive thirst, fatigue, depression, anxiety.  Patient admits to limb length discrepancy right lower extremity.      OBJECTIVE:  Appearance: alert, well appearing, and in no distress.    Ht 1.626 m (5' 4\")   Wt 82.1 kg (181 lb)   BMI 31.07 kg/m       Body mass index is 31.07 kg/m .     General appearance: Patient is alert and fully cooperative with history & exam.  No sign of distress is noted during the visit.  Psychiatric: Affect is pleasant & appropriate.  Patient appears motivated to improve health.  Respiratory: Breathing is regular & unlabored while sitting.  HEENT: Hearing is intact to spoken word.  Speech is clear.  No gross evidence of visual impairment that would impact ambulation.    Vascular: Dorsalis pedis and posterior tibial pulses are palpable. There is no pedal hair growth bilaterally.  CFT < 3 sec from anterior tibial surface to distal digits bilaterally. There is no appreciable edema " noted.  Dermatologic: Turgor and texture are within normal limits. No coloration or temperature changes. No primary or secondary lesions noted.  Neurologic: All epicritic and proprioceptive sensations are grossly intact bilaterally.  Musculoskeletal: All active and passive ankle, subtalar, midtarsal, and 1st MPJ range of motion are grossly intact without pain or crepitus, with the exception of none. Manual muscle strength is within normal limits bilaterally. All dorsiflexors, plantarflexors, invertors, evertors are intact bilaterally.  No tenderness present to both lower extremities on palpation.  No tenderness to bilateral feet or ankles with range of motion. Calf is soft/non-tender without warmth/induration    Imaging:       No images are attached to the encounter or orders placed in the encounter.     No results found.   No results found.         Oliver Mendoza DPM  [facility] Surgery/Podiatry         Again, thank you for allowing me to participate in the care of your patient.        Sincerely,        Oliver Alfaro DPM

## 2023-02-14 ENCOUNTER — ANCILLARY PROCEDURE (OUTPATIENT)
Dept: CARDIOLOGY | Facility: CLINIC | Age: 78
End: 2023-02-14
Attending: INTERNAL MEDICINE
Payer: COMMERCIAL

## 2023-02-14 DIAGNOSIS — I49.5 SICK SINUS SYNDROME (H): ICD-10-CM

## 2023-02-14 DIAGNOSIS — Z95.0 PACEMAKER: ICD-10-CM

## 2023-02-14 LAB
MDC_IDC_LEAD_IMPLANT_DT: NORMAL
MDC_IDC_LEAD_IMPLANT_DT: NORMAL
MDC_IDC_LEAD_LOCATION: NORMAL
MDC_IDC_LEAD_LOCATION: NORMAL
MDC_IDC_LEAD_LOCATION_DETAIL_1: NORMAL
MDC_IDC_LEAD_LOCATION_DETAIL_1: NORMAL
MDC_IDC_LEAD_MFG: NORMAL
MDC_IDC_LEAD_MFG: NORMAL
MDC_IDC_LEAD_MODEL: NORMAL
MDC_IDC_LEAD_MODEL: NORMAL
MDC_IDC_LEAD_POLARITY_TYPE: NORMAL
MDC_IDC_LEAD_POLARITY_TYPE: NORMAL
MDC_IDC_LEAD_SERIAL: NORMAL
MDC_IDC_LEAD_SERIAL: NORMAL
MDC_IDC_LEAD_SPECIAL_FUNCTION: NORMAL
MDC_IDC_LEAD_SPECIAL_FUNCTION: NORMAL
MDC_IDC_MSMT_BATTERY_DTM: NORMAL
MDC_IDC_MSMT_BATTERY_REMAINING_LONGEVITY: 135 MO
MDC_IDC_MSMT_BATTERY_RRT_TRIGGER: 2.62
MDC_IDC_MSMT_BATTERY_STATUS: NORMAL
MDC_IDC_MSMT_BATTERY_VOLTAGE: 3.02 V
MDC_IDC_MSMT_LEADCHNL_RA_IMPEDANCE_VALUE: 361 OHM
MDC_IDC_MSMT_LEADCHNL_RA_IMPEDANCE_VALUE: 513 OHM
MDC_IDC_MSMT_LEADCHNL_RA_PACING_THRESHOLD_AMPLITUDE: 0.62 V
MDC_IDC_MSMT_LEADCHNL_RA_PACING_THRESHOLD_PULSEWIDTH: 0.4 MS
MDC_IDC_MSMT_LEADCHNL_RA_SENSING_INTR_AMPL: 2.62 MV
MDC_IDC_MSMT_LEADCHNL_RA_SENSING_INTR_AMPL: 2.62 MV
MDC_IDC_MSMT_LEADCHNL_RV_IMPEDANCE_VALUE: 380 OHM
MDC_IDC_MSMT_LEADCHNL_RV_IMPEDANCE_VALUE: 513 OHM
MDC_IDC_MSMT_LEADCHNL_RV_PACING_THRESHOLD_AMPLITUDE: 0.62 V
MDC_IDC_MSMT_LEADCHNL_RV_PACING_THRESHOLD_PULSEWIDTH: 0.4 MS
MDC_IDC_MSMT_LEADCHNL_RV_SENSING_INTR_AMPL: 5.25 MV
MDC_IDC_MSMT_LEADCHNL_RV_SENSING_INTR_AMPL: 5.25 MV
MDC_IDC_PG_IMPLANT_DTM: NORMAL
MDC_IDC_PG_MFG: NORMAL
MDC_IDC_PG_MODEL: NORMAL
MDC_IDC_PG_SERIAL: NORMAL
MDC_IDC_PG_TYPE: NORMAL
MDC_IDC_SESS_CLINIC_NAME: NORMAL
MDC_IDC_SESS_DTM: NORMAL
MDC_IDC_SESS_TYPE: NORMAL
MDC_IDC_SET_BRADY_AT_MODE_SWITCH_RATE: 171 {BEATS}/MIN
MDC_IDC_SET_BRADY_HYSTRATE: NORMAL
MDC_IDC_SET_BRADY_LOWRATE: 70 {BEATS}/MIN
MDC_IDC_SET_BRADY_MAX_SENSOR_RATE: 130 {BEATS}/MIN
MDC_IDC_SET_BRADY_MAX_TRACKING_RATE: 130 {BEATS}/MIN
MDC_IDC_SET_BRADY_MODE: NORMAL
MDC_IDC_SET_BRADY_PAV_DELAY_LOW: 180 MS
MDC_IDC_SET_BRADY_SAV_DELAY_LOW: 150 MS
MDC_IDC_SET_LEADCHNL_RA_PACING_AMPLITUDE: 1.5 V
MDC_IDC_SET_LEADCHNL_RA_PACING_ANODE_ELECTRODE_1: NORMAL
MDC_IDC_SET_LEADCHNL_RA_PACING_ANODE_LOCATION_1: NORMAL
MDC_IDC_SET_LEADCHNL_RA_PACING_CAPTURE_MODE: NORMAL
MDC_IDC_SET_LEADCHNL_RA_PACING_CATHODE_ELECTRODE_1: NORMAL
MDC_IDC_SET_LEADCHNL_RA_PACING_CATHODE_LOCATION_1: NORMAL
MDC_IDC_SET_LEADCHNL_RA_PACING_POLARITY: NORMAL
MDC_IDC_SET_LEADCHNL_RA_PACING_PULSEWIDTH: 0.4 MS
MDC_IDC_SET_LEADCHNL_RA_SENSING_ANODE_ELECTRODE_1: NORMAL
MDC_IDC_SET_LEADCHNL_RA_SENSING_ANODE_LOCATION_1: NORMAL
MDC_IDC_SET_LEADCHNL_RA_SENSING_CATHODE_ELECTRODE_1: NORMAL
MDC_IDC_SET_LEADCHNL_RA_SENSING_CATHODE_LOCATION_1: NORMAL
MDC_IDC_SET_LEADCHNL_RA_SENSING_POLARITY: NORMAL
MDC_IDC_SET_LEADCHNL_RA_SENSING_SENSITIVITY: 0.45 MV
MDC_IDC_SET_LEADCHNL_RV_PACING_AMPLITUDE: 1.5 V
MDC_IDC_SET_LEADCHNL_RV_PACING_ANODE_ELECTRODE_1: NORMAL
MDC_IDC_SET_LEADCHNL_RV_PACING_ANODE_LOCATION_1: NORMAL
MDC_IDC_SET_LEADCHNL_RV_PACING_CAPTURE_MODE: NORMAL
MDC_IDC_SET_LEADCHNL_RV_PACING_CATHODE_ELECTRODE_1: NORMAL
MDC_IDC_SET_LEADCHNL_RV_PACING_CATHODE_LOCATION_1: NORMAL
MDC_IDC_SET_LEADCHNL_RV_PACING_POLARITY: NORMAL
MDC_IDC_SET_LEADCHNL_RV_PACING_PULSEWIDTH: 0.4 MS
MDC_IDC_SET_LEADCHNL_RV_SENSING_ANODE_ELECTRODE_1: NORMAL
MDC_IDC_SET_LEADCHNL_RV_SENSING_ANODE_LOCATION_1: NORMAL
MDC_IDC_SET_LEADCHNL_RV_SENSING_CATHODE_ELECTRODE_1: NORMAL
MDC_IDC_SET_LEADCHNL_RV_SENSING_CATHODE_LOCATION_1: NORMAL
MDC_IDC_SET_LEADCHNL_RV_SENSING_POLARITY: NORMAL
MDC_IDC_SET_LEADCHNL_RV_SENSING_SENSITIVITY: 0.9 MV
MDC_IDC_SET_ZONE_DETECTION_INTERVAL: 200 MS
MDC_IDC_SET_ZONE_DETECTION_INTERVAL: 350 MS
MDC_IDC_SET_ZONE_DETECTION_INTERVAL: 400 MS
MDC_IDC_SET_ZONE_TYPE: NORMAL
MDC_IDC_STAT_AT_BURDEN_PERCENT: 0 %
MDC_IDC_STAT_AT_DTM_END: NORMAL
MDC_IDC_STAT_AT_DTM_START: NORMAL
MDC_IDC_STAT_BRADY_AP_VP_PERCENT: 0.03 %
MDC_IDC_STAT_BRADY_AP_VS_PERCENT: 88.31 %
MDC_IDC_STAT_BRADY_AS_VP_PERCENT: 0.01 %
MDC_IDC_STAT_BRADY_AS_VS_PERCENT: 11.66 %
MDC_IDC_STAT_BRADY_DTM_END: NORMAL
MDC_IDC_STAT_BRADY_DTM_START: NORMAL
MDC_IDC_STAT_BRADY_RA_PERCENT_PACED: 87.22 %
MDC_IDC_STAT_BRADY_RV_PERCENT_PACED: 0.03 %
MDC_IDC_STAT_EPISODE_RECENT_COUNT: 0
MDC_IDC_STAT_EPISODE_RECENT_COUNT_DTM_END: NORMAL
MDC_IDC_STAT_EPISODE_RECENT_COUNT_DTM_START: NORMAL
MDC_IDC_STAT_EPISODE_TOTAL_COUNT: 0
MDC_IDC_STAT_EPISODE_TOTAL_COUNT: 1
MDC_IDC_STAT_EPISODE_TOTAL_COUNT: 2
MDC_IDC_STAT_EPISODE_TOTAL_COUNT_DTM_END: NORMAL
MDC_IDC_STAT_EPISODE_TOTAL_COUNT_DTM_START: NORMAL
MDC_IDC_STAT_EPISODE_TYPE: NORMAL

## 2023-02-14 PROCEDURE — 93296 REM INTERROG EVL PM/IDS: CPT | Performed by: INTERNAL MEDICINE

## 2023-02-14 PROCEDURE — 93294 REM INTERROG EVL PM/LDLS PM: CPT | Performed by: INTERNAL MEDICINE

## 2023-03-20 ENCOUNTER — TELEPHONE (OUTPATIENT)
Dept: NEUROLOGY | Facility: CLINIC | Age: 78
End: 2023-03-20

## 2023-03-20 ENCOUNTER — OFFICE VISIT (OUTPATIENT)
Dept: NEUROLOGY | Facility: CLINIC | Age: 78
End: 2023-03-20
Attending: INTERNAL MEDICINE
Payer: COMMERCIAL

## 2023-03-20 VITALS — SYSTOLIC BLOOD PRESSURE: 134 MMHG | HEART RATE: 83 BPM | DIASTOLIC BLOOD PRESSURE: 85 MMHG | OXYGEN SATURATION: 96 %

## 2023-03-20 DIAGNOSIS — G25.0 BENIGN ESSENTIAL TREMOR: ICD-10-CM

## 2023-03-20 PROCEDURE — 99205 OFFICE O/P NEW HI 60 MIN: CPT | Performed by: PSYCHIATRY & NEUROLOGY

## 2023-03-20 ASSESSMENT — PAIN SCALES - GENERAL: PAINLEVEL: MILD PAIN (3)
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
## 2023-03-20 NOTE — NURSING NOTE
"Abiola Hallman is a 77 year old female who presents for:  Chief Complaint   Patient presents with     Tremors     Benign essential tremor Ref Olvin Sarkar MD scheduled by patient records in [facility]        Initial Vitals:  /85   Pulse 83   SpO2 96%  Estimated body mass index is 31.07 kg/m  as calculated from the following:    Height as of 1/18/23: 1.626 m (5' 4\").    Weight as of 1/18/23: 82.1 kg (181 lb).. There is no height or weight on file to calculate BSA. BP completed using cuff size: regular  Mild Pain (3)    Nursing Comments:     Viki Torres MA  "
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
## 2023-04-12 DIAGNOSIS — I48.0 PAROXYSMAL ATRIAL FIBRILLATION (H): Primary | ICD-10-CM

## 2023-04-20 ENCOUNTER — OFFICE VISIT (OUTPATIENT)
Dept: CARDIOLOGY | Facility: CLINIC | Age: 78
End: 2023-04-20
Payer: COMMERCIAL

## 2023-04-20 ENCOUNTER — ANCILLARY PROCEDURE (OUTPATIENT)
Dept: CARDIOLOGY | Facility: CLINIC | Age: 78
End: 2023-04-20
Payer: COMMERCIAL

## 2023-04-20 VITALS
SYSTOLIC BLOOD PRESSURE: 144 MMHG | RESPIRATION RATE: 16 BRPM | DIASTOLIC BLOOD PRESSURE: 92 MMHG | HEIGHT: 64 IN | WEIGHT: 181 LBS | HEART RATE: 76 BPM | BODY MASS INDEX: 30.9 KG/M2

## 2023-04-20 DIAGNOSIS — G25.0 ESSENTIAL TREMOR: ICD-10-CM

## 2023-04-20 DIAGNOSIS — Z95.0 CARDIAC PACEMAKER IN SITU: Primary | ICD-10-CM

## 2023-04-20 DIAGNOSIS — G25.0 BENIGN ESSENTIAL TREMOR: ICD-10-CM

## 2023-04-20 DIAGNOSIS — I48.0 PAF (PAROXYSMAL ATRIAL FIBRILLATION) (H): Primary | ICD-10-CM

## 2023-04-20 DIAGNOSIS — I49.5 SICK SINUS SYNDROME (H): ICD-10-CM

## 2023-04-20 DIAGNOSIS — I10 BENIGN ESSENTIAL HYPERTENSION: Chronic | ICD-10-CM

## 2023-04-20 DIAGNOSIS — Z95.0 CARDIAC PACEMAKER IN SITU: ICD-10-CM

## 2023-04-20 LAB
MDC_IDC_LEAD_IMPLANT_DT: NORMAL
MDC_IDC_LEAD_IMPLANT_DT: NORMAL
MDC_IDC_LEAD_LOCATION: NORMAL
MDC_IDC_LEAD_LOCATION: NORMAL
MDC_IDC_LEAD_LOCATION_DETAIL_1: NORMAL
MDC_IDC_LEAD_LOCATION_DETAIL_1: NORMAL
MDC_IDC_LEAD_MFG: NORMAL
MDC_IDC_LEAD_MFG: NORMAL
MDC_IDC_LEAD_MODEL: NORMAL
MDC_IDC_LEAD_MODEL: NORMAL
MDC_IDC_LEAD_POLARITY_TYPE: NORMAL
MDC_IDC_LEAD_POLARITY_TYPE: NORMAL
MDC_IDC_LEAD_SERIAL: NORMAL
MDC_IDC_LEAD_SERIAL: NORMAL
MDC_IDC_LEAD_SPECIAL_FUNCTION: NORMAL
MDC_IDC_LEAD_SPECIAL_FUNCTION: NORMAL
MDC_IDC_MSMT_BATTERY_DTM: NORMAL
MDC_IDC_MSMT_BATTERY_REMAINING_LONGEVITY: 134 MO
MDC_IDC_MSMT_BATTERY_RRT_TRIGGER: 2.62
MDC_IDC_MSMT_BATTERY_STATUS: NORMAL
MDC_IDC_MSMT_BATTERY_VOLTAGE: 3.02 V
MDC_IDC_MSMT_LEADCHNL_RA_IMPEDANCE_VALUE: 361 OHM
MDC_IDC_MSMT_LEADCHNL_RA_IMPEDANCE_VALUE: 551 OHM
MDC_IDC_MSMT_LEADCHNL_RA_IMPEDANCE_VALUE: 551 OHM
MDC_IDC_MSMT_LEADCHNL_RA_PACING_THRESHOLD_AMPLITUDE: 0.5 V
MDC_IDC_MSMT_LEADCHNL_RA_PACING_THRESHOLD_AMPLITUDE: 0.62 V
MDC_IDC_MSMT_LEADCHNL_RA_PACING_THRESHOLD_PULSEWIDTH: 0.4 MS
MDC_IDC_MSMT_LEADCHNL_RA_PACING_THRESHOLD_PULSEWIDTH: 0.4 MS
MDC_IDC_MSMT_LEADCHNL_RA_SENSING_INTR_AMPL: 2.6 MV
MDC_IDC_MSMT_LEADCHNL_RA_SENSING_INTR_AMPL: 2.62 MV
MDC_IDC_MSMT_LEADCHNL_RA_SENSING_INTR_AMPL: 2.75 MV
MDC_IDC_MSMT_LEADCHNL_RV_IMPEDANCE_VALUE: 399 OHM
MDC_IDC_MSMT_LEADCHNL_RV_IMPEDANCE_VALUE: 608 OHM
MDC_IDC_MSMT_LEADCHNL_RV_IMPEDANCE_VALUE: 608 OHM
MDC_IDC_MSMT_LEADCHNL_RV_PACING_THRESHOLD_AMPLITUDE: 0.62 V
MDC_IDC_MSMT_LEADCHNL_RV_PACING_THRESHOLD_AMPLITUDE: 0.75 V
MDC_IDC_MSMT_LEADCHNL_RV_PACING_THRESHOLD_PULSEWIDTH: 0.4 MS
MDC_IDC_MSMT_LEADCHNL_RV_PACING_THRESHOLD_PULSEWIDTH: 0.4 MS
MDC_IDC_MSMT_LEADCHNL_RV_SENSING_INTR_AMPL: 10.5 MV
MDC_IDC_MSMT_LEADCHNL_RV_SENSING_INTR_AMPL: 4.5 MV
MDC_IDC_MSMT_LEADCHNL_RV_SENSING_INTR_AMPL: 4.5 MV
MDC_IDC_PG_IMPLANT_DTM: NORMAL
MDC_IDC_PG_MFG: NORMAL
MDC_IDC_PG_MODEL: NORMAL
MDC_IDC_PG_SERIAL: NORMAL
MDC_IDC_PG_TYPE: NORMAL
MDC_IDC_SESS_CLINIC_NAME: NORMAL
MDC_IDC_SESS_DTM: NORMAL
MDC_IDC_SESS_TYPE: NORMAL
MDC_IDC_SET_BRADY_AT_MODE_SWITCH_RATE: 171 {BEATS}/MIN
MDC_IDC_SET_BRADY_HYSTRATE: NORMAL
MDC_IDC_SET_BRADY_LOWRATE: 70 {BEATS}/MIN
MDC_IDC_SET_BRADY_MAX_SENSOR_RATE: 130 {BEATS}/MIN
MDC_IDC_SET_BRADY_MAX_TRACKING_RATE: 130 {BEATS}/MIN
MDC_IDC_SET_BRADY_MODE: NORMAL
MDC_IDC_SET_BRADY_PAV_DELAY_LOW: 180 MS
MDC_IDC_SET_BRADY_SAV_DELAY_LOW: 150 MS
MDC_IDC_SET_LEADCHNL_RA_PACING_AMPLITUDE: 1.5 V
MDC_IDC_SET_LEADCHNL_RA_PACING_ANODE_ELECTRODE_1: NORMAL
MDC_IDC_SET_LEADCHNL_RA_PACING_ANODE_LOCATION_1: NORMAL
MDC_IDC_SET_LEADCHNL_RA_PACING_CAPTURE_MODE: NORMAL
MDC_IDC_SET_LEADCHNL_RA_PACING_CATHODE_ELECTRODE_1: NORMAL
MDC_IDC_SET_LEADCHNL_RA_PACING_CATHODE_LOCATION_1: NORMAL
MDC_IDC_SET_LEADCHNL_RA_PACING_POLARITY: NORMAL
MDC_IDC_SET_LEADCHNL_RA_PACING_PULSEWIDTH: 0.4 MS
MDC_IDC_SET_LEADCHNL_RA_SENSING_ANODE_ELECTRODE_1: NORMAL
MDC_IDC_SET_LEADCHNL_RA_SENSING_ANODE_LOCATION_1: NORMAL
MDC_IDC_SET_LEADCHNL_RA_SENSING_CATHODE_ELECTRODE_1: NORMAL
MDC_IDC_SET_LEADCHNL_RA_SENSING_CATHODE_LOCATION_1: NORMAL
MDC_IDC_SET_LEADCHNL_RA_SENSING_POLARITY: NORMAL
MDC_IDC_SET_LEADCHNL_RA_SENSING_SENSITIVITY: 0.45 MV
MDC_IDC_SET_LEADCHNL_RV_PACING_AMPLITUDE: 1.5 V
MDC_IDC_SET_LEADCHNL_RV_PACING_ANODE_ELECTRODE_1: NORMAL
MDC_IDC_SET_LEADCHNL_RV_PACING_ANODE_LOCATION_1: NORMAL
MDC_IDC_SET_LEADCHNL_RV_PACING_CAPTURE_MODE: NORMAL
MDC_IDC_SET_LEADCHNL_RV_PACING_CATHODE_ELECTRODE_1: NORMAL
MDC_IDC_SET_LEADCHNL_RV_PACING_CATHODE_LOCATION_1: NORMAL
MDC_IDC_SET_LEADCHNL_RV_PACING_POLARITY: NORMAL
MDC_IDC_SET_LEADCHNL_RV_PACING_PULSEWIDTH: 0.4 MS
MDC_IDC_SET_LEADCHNL_RV_SENSING_ANODE_ELECTRODE_1: NORMAL
MDC_IDC_SET_LEADCHNL_RV_SENSING_ANODE_LOCATION_1: NORMAL
MDC_IDC_SET_LEADCHNL_RV_SENSING_CATHODE_ELECTRODE_1: NORMAL
MDC_IDC_SET_LEADCHNL_RV_SENSING_CATHODE_LOCATION_1: NORMAL
MDC_IDC_SET_LEADCHNL_RV_SENSING_POLARITY: NORMAL
MDC_IDC_SET_LEADCHNL_RV_SENSING_SENSITIVITY: 0.9 MV
MDC_IDC_SET_ZONE_DETECTION_INTERVAL: 200 MS
MDC_IDC_SET_ZONE_DETECTION_INTERVAL: 350 MS
MDC_IDC_SET_ZONE_DETECTION_INTERVAL: 400 MS
MDC_IDC_SET_ZONE_TYPE: NORMAL
MDC_IDC_STAT_AT_BURDEN_PERCENT: 0 %
MDC_IDC_STAT_AT_DTM_END: NORMAL
MDC_IDC_STAT_AT_DTM_START: NORMAL
MDC_IDC_STAT_BRADY_AP_VP_PERCENT: 0.03 %
MDC_IDC_STAT_BRADY_AP_VS_PERCENT: 88.87 %
MDC_IDC_STAT_BRADY_AS_VP_PERCENT: 0.01 %
MDC_IDC_STAT_BRADY_AS_VS_PERCENT: 11.1 %
MDC_IDC_STAT_BRADY_DTM_END: NORMAL
MDC_IDC_STAT_BRADY_DTM_START: NORMAL
MDC_IDC_STAT_BRADY_RA_PERCENT_PACED: 87.79 %
MDC_IDC_STAT_BRADY_RV_PERCENT_PACED: 0.04 %
MDC_IDC_STAT_EPISODE_RECENT_COUNT: 0
MDC_IDC_STAT_EPISODE_RECENT_COUNT: 0
MDC_IDC_STAT_EPISODE_RECENT_COUNT: 1
MDC_IDC_STAT_EPISODE_RECENT_COUNT_DTM_END: NORMAL
MDC_IDC_STAT_EPISODE_RECENT_COUNT_DTM_START: NORMAL
MDC_IDC_STAT_EPISODE_TOTAL_COUNT: 0
MDC_IDC_STAT_EPISODE_TOTAL_COUNT: 1
MDC_IDC_STAT_EPISODE_TOTAL_COUNT: 2
MDC_IDC_STAT_EPISODE_TOTAL_COUNT_DTM_END: NORMAL
MDC_IDC_STAT_EPISODE_TOTAL_COUNT_DTM_START: NORMAL
MDC_IDC_STAT_EPISODE_TYPE: NORMAL

## 2023-04-20 PROCEDURE — 93280 PM DEVICE PROGR EVAL DUAL: CPT | Performed by: INTERNAL MEDICINE

## 2023-04-20 PROCEDURE — 93000 ELECTROCARDIOGRAM COMPLETE: CPT | Performed by: INTERNAL MEDICINE

## 2023-04-20 PROCEDURE — 99214 OFFICE O/P EST MOD 30 MIN: CPT | Performed by: NURSE PRACTITIONER

## 2023-04-20 RX ORDER — PROPRANOLOL HCL 60 MG
60 CAPSULE, EXTENDED RELEASE 24HR ORAL DAILY
Qty: 30 CAPSULE | Refills: 3 | Status: SHIPPED | OUTPATIENT
Start: 2023-04-20 | End: 2023-07-06

## 2023-04-20 RX ORDER — SOTALOL HYDROCHLORIDE 80 MG/1
80 TABLET ORAL EVERY 12 HOURS
Qty: 180 TABLET | Refills: 3 | Status: SHIPPED | OUTPATIENT
Start: 2023-04-20 | End: 2024-04-09

## 2023-04-20 NOTE — LETTER
4/20/2023    RICKEY LEWIS MD  [address] [place] [address]    RE: Abiola Hallman       Dear Colleague,     I had the pleasure of seeing Abiola Hallman in the [facility].    Thank you, Dr. Lewis, for asking the [facility] team to see Ms. Abiola Hallman to evaluate paroxysmal atrial fibrillation.    Assessment/Recommendations     Assessment/Plan:    Diagnoses and all orders for this visit:  PAF (paroxysmal atrial fibrillation) (H) and no breakthrough of A-fib on low-dose sotalol of 80 mg p.o. every 12 hours over the last year.  I am hesitant to take her off of sotalol altogether as she could have breakthrough.  I certainly could try to decrease her to 60 mg p.o. every 12 hours if fatigue on combination of propranolol and sotalol.  I will await feedback from Dr. Lewis regarding this or he can adjust.  If sotalol dose is adjusted I would recommend remote device check in 1 month to evaluate A-fib burden.  Sotalol and Eliquis renewed.  -     ECG 12-Lead with MUSE - SJN,SJO,WWH  Cardiac pacemaker in situ--dual chamber Medtronic for SND-SSS and device functioning appropriately and battery life greater than 11 years.  Adequate tissue over pacemaker in right pocket.  Benign essential hypertension and systolic and diastolic hypertension seen today.  Adding propranolol which should help her blood pressure as well.  Essential tremor and QTc normal and has permanent pacemaker so dual beta-blocker okay.  Discussed she will need to see if she tolerates it from the energy level standpoint and I put a note in our Paceart system to watch for increased V pacing though I do not think the latter  will be an issue.  To start on propranolol long-acting 60 mg orally 1 tab every day.  To follow-up with Dr. Lewis regarding essential tremor.  Dr. Lewis to message me if he wants to switch her to another medication as we will need to explore other DOAC.    [id number] score of 6 and on Eliquis.  Follow up  with in clinic device check and see A-fib NP in 1 year.     History of Present Illness/Subjective     Abiola Hallman is a very pleasant 77 year old female who comes in today for yearly EP follow-up for paroxysmal atrial fibrillation and had device check prior to visit.  Abiola Hallman has a known history of paroxysmal atrial fibrillation and presented with A. fib with RVR and started on metoprolol 25 mg daily and then returned several days later with symptomatic bradycardia with heart rate in the 40s so she got a permanent pacemaker in September 2020.  She was subsequently started on sotalol for A. fib.  Abiola has had a lot of medical issues throughout her lifetime.  She has scoliosis and had significant back surgery.    She also had breast cancer and in remission.  She had eye surgery and tells me that they injured her neck during surgery.  She has had increased essential tremor which is interfering with ADLs.  She denies any cardiac symptoms.  Her energy level as well.  She does dancing once a week with her  and is really enjoying this.  She is active and able to do ADLs, housework and cooking.     Cardiographics (reviewed):  September 2019 echo shows:  Narrative & Impression  1. Normal left ventricular size and systolic performance with a visually estimated ejection fraction of 60-65%.   2. No significant valvular heart disease is identified on this study.   3. Normal right ventricular size and systolic performance.   4. Echo contrast examination was performed using agitated NS as contrast agent. The right heart opacity was adequate and the left heart was adequately visualized. There was no evidence of right to left shunting during spontaneous respiration or   following release of Valsalva.   Cardiac testing personally reviewed:  Twelve-lead ECG today shows: A paced V sensed rhythm of 76 and QTc of 434 ms  Device check today shows leads stable and battery ok.  Device functioning appropriately.   Atrial pacing  88% and Ventricular pacing less than 1%.  AT/AFib burden and none over the last year. No ventr arrhythmias.           Problem List:  Patient Active Problem List   Diagnosis    Acute anterior circulation TIA    Benign essential hypertension    Benign essential tremor    Labile hypertension    Idiosyncratic drug effect    History of stroke    PAF (paroxysmal atrial fibrillation) (H)    Cardiac pacemaker in situ--dual chamber Medtronic for SND-SSS    Major depressive disorder in partial remission (H)    Osteopenia    Personal history of colonic polyps    Scoliosis    Personal history of malignant neoplasm of breast    Hyperlipidemia LDL goal <70     Revi  e  Physical Examination Review of Systems   w [facility]  There were no vitals taken for this visit.  There is no height or weight on file to calculate BMI.  Wt Readings from Last 3 Encounters:   01/18/23 82.1 kg (181 lb)   01/06/23 82.1 kg (181 lb)   04/20/22 84.6 kg (186 lb 6.4 oz)     General Appearance:   Alert, well-appearing and in no acute distress.   HEENT: Atraumatic, normocephalic.  No scleral icterus, normal conjunctivae.     Chest: Chest symmetric, spine straight.   Lungs:   Respirations unlabored.   Cardiovascular:   Normal first and second heart sounds with no murmurs, rubs, or gallops.  Slightly irregular with occasional PAC.   Normal JVD, no edema.       Extremities: No cyanosis or clubbing   Musculoskeletal: Moves all extremities   Skin: Warm, dry, intact.    Neurologic: Mood and affect are appropriate, alert and oriented to person, place, time, and situation     ROS: 10 point ROS neg other than the symptoms noted above in the HPI.     Medical History  Surgical History Family History Social History     Past Medical History:   Diagnosis Date    Breast cancer (H) 2007    Hypertension     Stroke (H)     Past Surgical History:   Procedure Laterality Date    ABDOMEN SURGERY      BACK SURGERY      BIOPSY BREAST Right 2008 2011, 2012    BIOPSY BREAST Left  2007    EP PACEMAKER INSERT N/A 9/14/2020    Procedure: EP Pacemaker Insertion;  Surgeon: Cl Massey MD;  Location: [facility];  Service: Cardiology    LUMPECTOMY BREAST Left 2007    MASTECTOMY Left 2007    Family History   Problem Relation Age of Onset    Breast Cancer Mother 80.00    History   Smoking Status    Never   Smokeless Tobacco    Never     Social History    Substance and Sexual Activity      Alcohol use: Never       Medications  Allergies     Current Outpatient Medications   Medication Sig Dispense Refill    acetaminophen (TYLENOL) 325 MG tablet [ACETAMINOPHEN (TYLENOL) 325 MG TABLET] Take 1 tablet (325 mg total) by mouth every 4 (four) hours as needed.  0    apixaban ANTICOAGULANT (ELIQUIS ANTICOAGULANT) 5 MG tablet Take 1 tablet (5 mg) by mouth every 12 hours 60 tablet 11    atorvastatin (LIPITOR) 10 MG tablet Take 1 tablet (10 mg) by mouth At Bedtime 90 tablet 3    diphenhydrAMINE (BENADRYL) 25 MG tablet Take 25 mg by mouth nightly as needed for itching or allergies (Patient not taking: Reported on 3/20/2023)      famotidine (PEPCID) 20 MG tablet [FAMOTIDINE (PEPCID) 20 MG TABLET] Take 1 tablet (20 mg total) by mouth daily. (for stomach acid)  0    lisinopril (ZESTRIL) 2.5 MG tablet TAKE 1 TABLET(2.5 MG) BY MOUTH TWICE DAILY 180 tablet 1    multivitamin Chew [MULTIVITAMIN CHEW] Chew 2 tablets daily.       sertraline (ZOLOFT) 50 MG tablet Take 1 tablet (50 mg) by mouth daily 90 tablet 3    sotalol (BETAPACE) 80 MG tablet Take 1 tablet (80 mg) by mouth every 12 hours 180 tablet 3      Allergies   Allergen Reactions    Essential Oils [External Allergen Needs Reconciliation - See Comment] Shortness Of Breath, Cough and Headache    Hydralazine Nausea and Vomiting and Headache     Possibly. 12 hrs of Nausea and headache after one 10 mg dose    Adhesive Tape-Silicones [Adhesive Tape] Unknown     Bandaids    Codeine Unknown    Darvon [Propoxyphene] Unknown    Latex Unknown    Monistat 1  (Tioconazole) [Tioconazole] Unknown    Neurontin [Gabapentin] Unknown    Oxycodone Unknown    Paxil [Paroxetine] Unknown    Vicodin [Hydrocodone-Acetaminophen] Unknown    Citalopram Anxiety    Paroxetine Anxiety      Medical, surgical, family, social history, and medications were all reviewed and updated as necessary.   Lab Results    Chemistry/lipid CBC Cardiac Enzymes/BNP/TSH/INR   Recent Labs   Lab Test 01/12/23  1125   CHOL 151   HDL 58   LDL 72   TRIG 106     Recent Labs   Lab Test 01/12/23  1125 09/06/19  0553   LDL 72 96     Recent Labs   Lab Test 01/12/23  1125      POTASSIUM 4.4   CHLORIDE 105   CO2 24   GLC 99   BUN 18.0   CR 0.92   GFRESTIMATED 63   BECKY 9.8     Recent Labs   Lab Test 01/12/23  1125 01/13/22  1517 09/12/20  0532   CR 0.92 0.86 0.84     Recent Labs   Lab Test 09/06/19  0553   A1C 5.8          Recent Labs   Lab Test 01/12/23  1125   WBC 6.4   HGB 13.8   HCT 43.6   MCV 93        Recent Labs   Lab Test 01/12/23  1125 01/13/22  1518 09/12/20  0532   HGB 13.8 13.9 12.5    Recent Labs   Lab Test 09/11/20  2351 09/10/20  0028   TROPONINI <0.01 <0.01     No results for input(s): BNP, NTBNPI, NTBNP in the last 48329 hours.  Recent Labs   Lab Test 01/12/23  1125   TSH 1.71     Recent Labs   Lab Test 09/10/20  0028 09/05/19  1957   INR 1.05 1.08          Total Time- 35 minutes spent on date of encounter doing chart review, history and exam, documentation and further activities as noted above.  This note has been dictated using voice recognition software. Any grammatical, typographical, or context distortions are unintentional and inherent to the software.                      Thank you for allowing me to participate in the care of your patient.      Sincerely,     RUDOLPH Menezes [facility]  cc:   Deni Kemp MD  No address on file

## 2023-04-20 NOTE — PATIENT INSTRUCTIONS
Abiola Hallman,    It was a pleasure to see you today at the [facility].     My recommendations after this visit include:    Start on propanolol LA 60 mg 1 tab orally every am.  See Dr. Sarkar regarding essential tremor.      Stay on sotalol.      To followup with in clinic device in 1 year and see Afib NP.      My contact information:  Mario Alberto Styles, DOMO  After Hours or Scheduling  [phone]  My Nurse---Cary Zhang [phone]
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
## 2023-04-21 LAB
ATRIAL RATE - MUSE: 70 BPM
DIASTOLIC BLOOD PRESSURE - MUSE: NORMAL MMHG
INTERPRETATION ECG - MUSE: NORMAL
P AXIS - MUSE: NORMAL DEGREES
PR INTERVAL - MUSE: NORMAL MS
QRS DURATION - MUSE: 84 MS
QT - MUSE: 386 MS
QTC - MUSE: 434 MS
R AXIS - MUSE: 45 DEGREES
SYSTOLIC BLOOD PRESSURE - MUSE: NORMAL MMHG
T AXIS - MUSE: 41 DEGREES
VENTRICULAR RATE- MUSE: 76 BPM

## 2023-05-03 ENCOUNTER — OFFICE VISIT (OUTPATIENT)
Dept: INTERNAL MEDICINE | Facility: CLINIC | Age: 78
End: 2023-05-03
Payer: COMMERCIAL

## 2023-05-03 VITALS
HEART RATE: 80 BPM | DIASTOLIC BLOOD PRESSURE: 64 MMHG | TEMPERATURE: 98.1 F | RESPIRATION RATE: 16 BRPM | HEIGHT: 64 IN | WEIGHT: 179 LBS | OXYGEN SATURATION: 97 % | SYSTOLIC BLOOD PRESSURE: 126 MMHG | BODY MASS INDEX: 30.56 KG/M2

## 2023-05-03 DIAGNOSIS — G25.0 BENIGN ESSENTIAL TREMOR: Primary | ICD-10-CM

## 2023-05-03 PROCEDURE — 99213 OFFICE O/P EST LOW 20 MIN: CPT | Performed by: INTERNAL MEDICINE
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
## 2023-06-06 ENCOUNTER — TELEPHONE (OUTPATIENT)
Dept: CARDIOLOGY | Facility: CLINIC | Age: 78
End: 2023-06-06
Payer: COMMERCIAL

## 2023-06-06 NOTE — TELEPHONE ENCOUNTER
Pt calling to see who she can see when Mario Alberto retires reviewed the team with pt  Also has device questions and device number given for pt to call

## 2023-06-06 NOTE — TELEPHONE ENCOUNTER
[facility] Call Center    Phone Message    May a detailed message be left on voicemail: yes     Reason for Call: Other: Pt would like to speak with care team to discuss doing to the dentist and her options for a new provider. Please reach out to pt to further assist.     Action Taken: Other: Cardiology    Travel Screening: Not Applicable     Thank you!  [facility]

## 2023-06-07 DIAGNOSIS — R09.89 LABILE HYPERTENSION: ICD-10-CM

## 2023-06-08 RX ORDER — LISINOPRIL 2.5 MG/1
TABLET ORAL
Qty: 180 TABLET | Refills: 3 | Status: SHIPPED | OUTPATIENT
Start: 2023-06-08 | End: 2024-04-09

## 2023-06-29 ENCOUNTER — ANCILLARY PROCEDURE (OUTPATIENT)
Dept: MAMMOGRAPHY | Facility: CLINIC | Age: 78
End: 2023-06-29
Attending: INTERNAL MEDICINE
Payer: COMMERCIAL

## 2023-06-29 DIAGNOSIS — Z12.31 VISIT FOR SCREENING MAMMOGRAM: ICD-10-CM

## 2023-06-29 PROCEDURE — 77067 SCR MAMMO BI INCL CAD: CPT | Mod: 52

## 2023-07-03 ENCOUNTER — OFFICE VISIT (OUTPATIENT)
Dept: OPHTHALMOLOGY | Facility: CLINIC | Age: 78
End: 2023-07-03
Payer: COMMERCIAL

## 2023-07-03 DIAGNOSIS — H52.4 PRESBYOPIA: ICD-10-CM

## 2023-07-03 DIAGNOSIS — Z96.1 PSEUDOPHAKIA OF BOTH EYES: ICD-10-CM

## 2023-07-03 DIAGNOSIS — Z98.890 HISTORY OF VITRECTOMY: ICD-10-CM

## 2023-07-03 DIAGNOSIS — D31.32 CHOROIDAL NEVUS OF LEFT EYE: ICD-10-CM

## 2023-07-03 DIAGNOSIS — Z01.01 ENCOUNTER FOR EXAMINATION OF EYES AND VISION WITH ABNORMAL FINDINGS: Primary | ICD-10-CM

## 2023-07-03 DIAGNOSIS — L30.9 PERIOCULAR DERMATITIS: ICD-10-CM

## 2023-07-03 DIAGNOSIS — H43.812 POSTERIOR VITREOUS DETACHMENT, LEFT EYE: ICD-10-CM

## 2023-07-03 PROCEDURE — 92004 COMPRE OPH EXAM NEW PT 1/>: CPT | Performed by: OPHTHALMOLOGY

## 2023-07-03 PROCEDURE — 92015 DETERMINE REFRACTIVE STATE: CPT | Performed by: OPHTHALMOLOGY

## 2023-07-03 ASSESSMENT — CONF VISUAL FIELD
OS_NORMAL: 1
OD_INFERIOR_NASAL_RESTRICTION: 0
OS_SUPERIOR_NASAL_RESTRICTION: 0
OS_INFERIOR_TEMPORAL_RESTRICTION: 0
OD_INFERIOR_TEMPORAL_RESTRICTION: 0
OD_SUPERIOR_NASAL_RESTRICTION: 0
OS_SUPERIOR_TEMPORAL_RESTRICTION: 0
OD_SUPERIOR_TEMPORAL_RESTRICTION: 0
OS_INFERIOR_NASAL_RESTRICTION: 0
OD_NORMAL: 1

## 2023-07-03 ASSESSMENT — REFRACTION_MANIFEST
OS_SPHERE: -0.50
OS_AXIS: 178
OD_SPHERE: PLANO
OD_ADD: +2.75
OS_CYLINDER: +1.25
OS_ADD: +2.75
OD_CYLINDER: +0.75
OD_AXIS: 007

## 2023-07-03 ASSESSMENT — REFRACTION_WEARINGRX
OD_AXIS: 008
OS_AXIS: 002
OD_SPHERE: +0.75
OS_ADD: +2.75
SPECS_TYPE: BIFOCAL
OS_SPHERE: +0.50
OD_ADD: +2.75
OD_CYLINDER: +0.25
OS_CYLINDER: +1.00

## 2023-07-03 ASSESSMENT — SLIT LAMP EXAM - LIDS
COMMENTS: 2+ MEIBOMIAN GLAND DYSFUNCTION
COMMENTS: 2+ MEIBOMIAN GLAND DYSFUNCTION

## 2023-07-03 ASSESSMENT — TONOMETRY
OS_IOP_MMHG: 18
OD_IOP_MMHG: 17
IOP_METHOD: APPLANATION

## 2023-07-03 ASSESSMENT — VISUAL ACUITY
OS_CC: 20/30
CORRECTION_TYPE: GLASSES
METHOD: SNELLEN - LINEAR
OD_CC: 20/30
OS_CC+: -2
OD_CC+: -2

## 2023-07-03 ASSESSMENT — CUP TO DISC RATIO
OD_RATIO: 0.5
OS_RATIO: 0.5

## 2023-07-03 NOTE — LETTER
7/3/2023         RE: Abiola Hallman  [address]  [place] [address]        Dear Colleague,    Thank you for referring your patient, Abiola Hallman, to the [facility]. Please see a copy of my visit note below.     Current Eye Medications:  none     Subjective: here for complete eye exam.  Had a traumatic surgery with Dr. Travis on the right eye, then Dr. Gerard did the cataract surgery. Wearing older glasses as they are more clear than the newer glasses. Had used refresh and Systane but it caused redness and scaling lids.    Epiretinal membrane kendall w Dr. Travis?      Objective:  See Ophthalmology Exam.       Assessment:  Baseline eye exam in patient who is pseudophakic.      ICD-10-CM    1. Encounter for examination of eyes and vision with abnormal findings  Z01.01       2. Presbyopia  H52.4       3. Pseudophakia of both eyes  Z96.1       4. History of vitrectomy, od  Z98.890       5. 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 Periocular dermatitis  L30.9       7. Posterior vitreous detachment, left eye  H43.812            Plan:  Can use Refresh Plus as needed for dryness.   Start Francisco/Dex ointment- apply sparingly to upper lids.   Possible clouding of posterior capsule both eyes discussed.   Glasses Rx given - optional   Call in March 2024 for an appointment in July 2024 for Complete Exam    Dr. Contreras [phone]          Again, thank you for allowing me to participate in the care of your patient.        Sincerely,        Larry Contreras MD

## 2023-07-03 NOTE — PATIENT INSTRUCTIONS
Can use Refresh Plus as needed for dryness.   Start Francisco/Dex ointment- apply sparingly to upper lids.   Possible clouding of posterior capsule both eyes discussed.   Glasses Rx given - optional   Call in March 2024 for an appointment in July 2024 for Complete Exam    Dr. Contreras [phone]

## 2023-07-03 NOTE — PROGRESS NOTES
Current Eye Medications:  none     Subjective: here for complete eye exam.  Had a traumatic surgery with Dr. Travis on the right eye, then Dr. Gerard did the cataract surgery. Wearing older glasses as they are more clear than the newer glasses. Had used refresh and Systane but it caused redness and scaling lids.    Epiretinal membrane kendall Travis?      Objective:  See Ophthalmology Exam.       Assessment:  Baseline eye exam in patient who is pseudophakic.      ICD-10-CM    1. Encounter for examination of eyes and vision with abnormal findings  Z01.01       2. Presbyopia  H52.4       3. Pseudophakia of both eyes  Z96.1       4. History of vitrectomy, od  Z98.890       5. 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 Periocular dermatitis  L30.9       7. Posterior vitreous detachment, left eye  H43.812            Plan:  Can use Refresh Plus as needed for dryness.   Start Francisco/Dex ointment- apply sparingly to upper lids.   Possible clouding of posterior capsule both eyes discussed.   Glasses Rx given - optional   Call in March 2024 for an appointment in July 2024 for Complete Exam    Dr. Contreras [phone]

## 2023-07-04 PROBLEM — Z96.1 PSEUDOPHAKIA OF BOTH EYES: Status: ACTIVE | Noted: 2023-07-04

## 2023-07-04 PROBLEM — H43.812 POSTERIOR VITREOUS DETACHMENT, LEFT EYE: Status: ACTIVE | Noted: 2023-07-04

## 2023-07-04 PROBLEM — D31.32 CHOROIDAL NEVUS OF LEFT EYE: Status: ACTIVE | Noted: 2023-07-04

## 2023-07-04 PROBLEM — L30.9 PERIOCULAR DERMATITIS: Status: ACTIVE | Noted: 2023-07-04

## 2023-07-04 PROBLEM — Z98.890 HISTORY OF VITRECTOMY: Status: ACTIVE | Noted: 2023-07-04

## 2023-07-04 RX ORDER — NEOMYCIN SULFATE, POLYMYXIN B SULFATE, AND DEXAMETHASONE 3.5; 10000; 1 MG/G; [USP'U]/G; MG/G
OINTMENT OPHTHALMIC
Qty: 3.5 G | Refills: 3 | Status: SHIPPED | OUTPATIENT
Start: 2023-07-04 | End: 2023-09-19

## 2023-07-06 DIAGNOSIS — G25.0 ESSENTIAL TREMOR: ICD-10-CM

## 2023-07-06 RX ORDER — PROPRANOLOL HCL 60 MG
60 CAPSULE, EXTENDED RELEASE 24HR ORAL DAILY
Qty: 30 CAPSULE | Refills: 11 | Status: SHIPPED | OUTPATIENT
Start: 2023-07-06 | End: 2024-06-18

## 2023-07-06 NOTE — TELEPHONE ENCOUNTER
"Last Written Prescription Date:  4/20/2023  Last Fill Quantity: 30,  # refills: 3   Last office visit provider:  5/3/2023     Requested Prescriptions   Pending Prescriptions Disp Refills     propranolol ER (INDERAL LA) 60 MG 24 hr capsule 30 capsule 3     Sig: Take 1 capsule (60 mg) by mouth daily       Beta-Blockers Protocol Passed - 7/6/2023 12:47 PM        Passed - Blood pressure under 140/90 in past 12 months     BP Readings from Last 3 Encounters:   05/03/23 126/64   04/20/23 (!) 144/92   03/20/23 134/85                 Passed - Patient is age 6 or older        Passed - Recent (12 mo) or future (30 days) visit within the authorizing provider's specialty     Patient has had an office visit with the authorizing provider or a provider within the authorizing providers department within the previous 12 mos or has a future within next 30 days. See \"Patient Info\" tab in inbasket, or \"Choose Columns\" in Meds & Orders section of the refill encounter.              Passed - Medication is active on med list             Kayla Navarrete RN 07/06/23 3:32 PM  "

## 2023-07-19 ENCOUNTER — TRANSFERRED RECORDS (OUTPATIENT)
Dept: HEALTH INFORMATION MANAGEMENT | Facility: CLINIC | Age: 78
End: 2023-07-19
Payer: COMMERCIAL

## 2023-07-24 ENCOUNTER — ANCILLARY PROCEDURE (OUTPATIENT)
Dept: CARDIOLOGY | Facility: CLINIC | Age: 78
End: 2023-07-24
Attending: INTERNAL MEDICINE
Payer: COMMERCIAL

## 2023-07-24 DIAGNOSIS — Z95.0 PACEMAKER: ICD-10-CM

## 2023-07-24 DIAGNOSIS — I49.5 SICK SINUS SYNDROME (H): ICD-10-CM

## 2023-07-24 LAB
MDC_IDC_LEAD_IMPLANT_DT: NORMAL
MDC_IDC_LEAD_IMPLANT_DT: NORMAL
MDC_IDC_LEAD_LOCATION: NORMAL
MDC_IDC_LEAD_LOCATION: NORMAL
MDC_IDC_LEAD_LOCATION_DETAIL_1: NORMAL
MDC_IDC_LEAD_LOCATION_DETAIL_1: NORMAL
MDC_IDC_LEAD_MFG: NORMAL
MDC_IDC_LEAD_MFG: NORMAL
MDC_IDC_LEAD_MODEL: NORMAL
MDC_IDC_LEAD_MODEL: NORMAL
MDC_IDC_LEAD_POLARITY_TYPE: NORMAL
MDC_IDC_LEAD_POLARITY_TYPE: NORMAL
MDC_IDC_LEAD_SERIAL: NORMAL
MDC_IDC_LEAD_SERIAL: NORMAL
MDC_IDC_LEAD_SPECIAL_FUNCTION: NORMAL
MDC_IDC_LEAD_SPECIAL_FUNCTION: NORMAL
MDC_IDC_MSMT_BATTERY_DTM: NORMAL
MDC_IDC_MSMT_BATTERY_REMAINING_LONGEVITY: 127 MO
MDC_IDC_MSMT_BATTERY_RRT_TRIGGER: 2.62
MDC_IDC_MSMT_BATTERY_STATUS: NORMAL
MDC_IDC_MSMT_BATTERY_VOLTAGE: 3.02 V
MDC_IDC_MSMT_LEADCHNL_RA_IMPEDANCE_VALUE: 342 OHM
MDC_IDC_MSMT_LEADCHNL_RA_IMPEDANCE_VALUE: 437 OHM
MDC_IDC_MSMT_LEADCHNL_RA_PACING_THRESHOLD_AMPLITUDE: 0.5 V
MDC_IDC_MSMT_LEADCHNL_RA_PACING_THRESHOLD_PULSEWIDTH: 0.4 MS
MDC_IDC_MSMT_LEADCHNL_RA_SENSING_INTR_AMPL: 3.5 MV
MDC_IDC_MSMT_LEADCHNL_RA_SENSING_INTR_AMPL: 3.5 MV
MDC_IDC_MSMT_LEADCHNL_RV_IMPEDANCE_VALUE: 361 OHM
MDC_IDC_MSMT_LEADCHNL_RV_IMPEDANCE_VALUE: 475 OHM
MDC_IDC_MSMT_LEADCHNL_RV_PACING_THRESHOLD_AMPLITUDE: 0.62 V
MDC_IDC_MSMT_LEADCHNL_RV_PACING_THRESHOLD_PULSEWIDTH: 0.4 MS
MDC_IDC_MSMT_LEADCHNL_RV_SENSING_INTR_AMPL: 6.62 MV
MDC_IDC_MSMT_LEADCHNL_RV_SENSING_INTR_AMPL: 6.62 MV
MDC_IDC_PG_IMPLANT_DTM: NORMAL
MDC_IDC_PG_MFG: NORMAL
MDC_IDC_PG_MODEL: NORMAL
MDC_IDC_PG_SERIAL: NORMAL
MDC_IDC_PG_TYPE: NORMAL
MDC_IDC_SESS_CLINIC_NAME: NORMAL
MDC_IDC_SESS_DTM: NORMAL
MDC_IDC_SESS_TYPE: NORMAL
MDC_IDC_SET_BRADY_AT_MODE_SWITCH_RATE: 171 {BEATS}/MIN
MDC_IDC_SET_BRADY_HYSTRATE: NORMAL
MDC_IDC_SET_BRADY_LOWRATE: 70 {BEATS}/MIN
MDC_IDC_SET_BRADY_MAX_SENSOR_RATE: 130 {BEATS}/MIN
MDC_IDC_SET_BRADY_MAX_TRACKING_RATE: 130 {BEATS}/MIN
MDC_IDC_SET_BRADY_MODE: NORMAL
MDC_IDC_SET_BRADY_PAV_DELAY_LOW: 180 MS
MDC_IDC_SET_BRADY_SAV_DELAY_LOW: 150 MS
MDC_IDC_SET_LEADCHNL_RA_PACING_AMPLITUDE: 1.5 V
MDC_IDC_SET_LEADCHNL_RA_PACING_ANODE_ELECTRODE_1: NORMAL
MDC_IDC_SET_LEADCHNL_RA_PACING_ANODE_LOCATION_1: NORMAL
MDC_IDC_SET_LEADCHNL_RA_PACING_CAPTURE_MODE: NORMAL
MDC_IDC_SET_LEADCHNL_RA_PACING_CATHODE_ELECTRODE_1: NORMAL
MDC_IDC_SET_LEADCHNL_RA_PACING_CATHODE_LOCATION_1: NORMAL
MDC_IDC_SET_LEADCHNL_RA_PACING_POLARITY: NORMAL
MDC_IDC_SET_LEADCHNL_RA_PACING_PULSEWIDTH: 0.4 MS
MDC_IDC_SET_LEADCHNL_RA_SENSING_ANODE_ELECTRODE_1: NORMAL
MDC_IDC_SET_LEADCHNL_RA_SENSING_ANODE_LOCATION_1: NORMAL
MDC_IDC_SET_LEADCHNL_RA_SENSING_CATHODE_ELECTRODE_1: NORMAL
MDC_IDC_SET_LEADCHNL_RA_SENSING_CATHODE_LOCATION_1: NORMAL
MDC_IDC_SET_LEADCHNL_RA_SENSING_POLARITY: NORMAL
MDC_IDC_SET_LEADCHNL_RA_SENSING_SENSITIVITY: 0.45 MV
MDC_IDC_SET_LEADCHNL_RV_PACING_AMPLITUDE: 1.5 V
MDC_IDC_SET_LEADCHNL_RV_PACING_ANODE_ELECTRODE_1: NORMAL
MDC_IDC_SET_LEADCHNL_RV_PACING_ANODE_LOCATION_1: NORMAL
MDC_IDC_SET_LEADCHNL_RV_PACING_CAPTURE_MODE: NORMAL
MDC_IDC_SET_LEADCHNL_RV_PACING_CATHODE_ELECTRODE_1: NORMAL
MDC_IDC_SET_LEADCHNL_RV_PACING_CATHODE_LOCATION_1: NORMAL
MDC_IDC_SET_LEADCHNL_RV_PACING_POLARITY: NORMAL
MDC_IDC_SET_LEADCHNL_RV_PACING_PULSEWIDTH: 0.4 MS
MDC_IDC_SET_LEADCHNL_RV_SENSING_ANODE_ELECTRODE_1: NORMAL
MDC_IDC_SET_LEADCHNL_RV_SENSING_ANODE_LOCATION_1: NORMAL
MDC_IDC_SET_LEADCHNL_RV_SENSING_CATHODE_ELECTRODE_1: NORMAL
MDC_IDC_SET_LEADCHNL_RV_SENSING_CATHODE_LOCATION_1: NORMAL
MDC_IDC_SET_LEADCHNL_RV_SENSING_POLARITY: NORMAL
MDC_IDC_SET_LEADCHNL_RV_SENSING_SENSITIVITY: 0.9 MV
MDC_IDC_SET_ZONE_DETECTION_INTERVAL: 200 MS
MDC_IDC_SET_ZONE_DETECTION_INTERVAL: 350 MS
MDC_IDC_SET_ZONE_DETECTION_INTERVAL: 400 MS
MDC_IDC_SET_ZONE_TYPE: NORMAL
MDC_IDC_STAT_AT_BURDEN_PERCENT: 0 %
MDC_IDC_STAT_AT_DTM_END: NORMAL
MDC_IDC_STAT_AT_DTM_START: NORMAL
MDC_IDC_STAT_BRADY_AP_VP_PERCENT: 0.03 %
MDC_IDC_STAT_BRADY_AP_VS_PERCENT: 91.64 %
MDC_IDC_STAT_BRADY_AS_VP_PERCENT: 0.01 %
MDC_IDC_STAT_BRADY_AS_VS_PERCENT: 8.32 %
MDC_IDC_STAT_BRADY_DTM_END: NORMAL
MDC_IDC_STAT_BRADY_DTM_START: NORMAL
MDC_IDC_STAT_BRADY_RA_PERCENT_PACED: 90.79 %
MDC_IDC_STAT_BRADY_RV_PERCENT_PACED: 0.03 %
MDC_IDC_STAT_EPISODE_RECENT_COUNT: 0
MDC_IDC_STAT_EPISODE_RECENT_COUNT_DTM_END: NORMAL
MDC_IDC_STAT_EPISODE_RECENT_COUNT_DTM_START: NORMAL
MDC_IDC_STAT_EPISODE_TOTAL_COUNT: 0
MDC_IDC_STAT_EPISODE_TOTAL_COUNT: 1
MDC_IDC_STAT_EPISODE_TOTAL_COUNT: 2
MDC_IDC_STAT_EPISODE_TOTAL_COUNT_DTM_END: NORMAL
MDC_IDC_STAT_EPISODE_TOTAL_COUNT_DTM_START: NORMAL
MDC_IDC_STAT_EPISODE_TYPE: NORMAL

## 2023-07-24 PROCEDURE — 93296 REM INTERROG EVL PM/IDS: CPT | Performed by: INTERNAL MEDICINE

## 2023-07-24 PROCEDURE — 93294 REM INTERROG EVL PM/LDLS PM: CPT | Performed by: INTERNAL MEDICINE

## 2023-09-19 ENCOUNTER — OFFICE VISIT (OUTPATIENT)
Dept: NEUROLOGY | Facility: CLINIC | Age: 78
End: 2023-09-19
Payer: COMMERCIAL

## 2023-09-19 VITALS — OXYGEN SATURATION: 97 % | DIASTOLIC BLOOD PRESSURE: 76 MMHG | HEART RATE: 67 BPM | SYSTOLIC BLOOD PRESSURE: 115 MMHG

## 2023-09-19 DIAGNOSIS — G25.0 BENIGN ESSENTIAL TREMOR: Primary | ICD-10-CM

## 2023-09-19 PROCEDURE — 99214 OFFICE O/P EST MOD 30 MIN: CPT | Performed by: PSYCHIATRY & NEUROLOGY

## 2023-09-19 NOTE — LETTER
2023         RE: Abiola Hallman  [address] [place] [address]        Dear Colleague,    Thank you for referring your patient, Abiola Hallman, to the [facility]. Please see a copy of my visit note below.    Department of Neurology  Movement Disorders Division   Return Patient Visit    Patient: Abiola Hallman   MRN: [id number]   : 1945   Date of Visit: 2023  PCP: RICKEY LEWIS MD   Referring provider: No ref. provider found    CC:  Essential tremor, return to clinic    Interval history:  Ms. Hallman is a 77 year old right-handed female with history significant for significant cardiovascular disease, follows regularly with cardiologist, status post initial consultation with me back in March of this year with concerns for untreated essential tremor who presents to movement disorder clinic for regular follow-up. Last visit was 2023, with a plan to bahman with her cardiologist regarding #1 switching her off Eliquis and to warfarin, which with a goal is to start on primidone, versus #2 try her back on propranolol.    Overall she endorses a significant clinical improvement on a very low-dose of propranolol.  There was started with the clearance of a cardiologist after our visit last time and some discussions via epic messaging.  She feels that overall she is very independent in performing her activities of daily living.  She denies any tremors now while she is eating or drinking from a cup.  She is able to put her make-up on, get dressed, do buttoning and just perform overall self-care and personal hygiene.    If her daughter notices that her head does not shake anymore she has no issues while eating.    She denies any clinical symptoms concerning for symptomatic hypotension or bradycardia.  She denies any changes in mood.    She does not fusion needs any changes at this point she feels pretty optimized.      ROS:  All others negative except as  listed above. Pertinent movement disorders-specific ROS listed above.    Past Medical History:   Diagnosis Date     Breast cancer (H) 01/01/2007     Hypertension      Retinal detachment      Scoliosis     sx at age 53     Stroke (H)      Tremor         Past Surgical History:   Procedure Laterality Date     ABDOMEN SURGERY       BACK SURGERY       BIOPSY BREAST Right 01/01/2008 2011, 2012     BIOPSY BREAST Left 01/01/2007     CATARACT IOL, RT/LT Bilateral      EP PACEMAKER INSERT N/A 09/14/2020    Procedure: EP Pacemaker Insertion;  Surgeon: Cl Massey MD;  Location: [facility];  Service: Cardiology     LUMPECTOMY BREAST Left 01/01/2007     MASTECTOMY Left 01/01/2007     RETINAL REATTACHMENT Right           Current Outpatient Medications:      acetaminophen (TYLENOL) 325 MG tablet, [ACETAMINOPHEN (TYLENOL) 325 MG TABLET] Take 1 tablet (325 mg total) by mouth every 4 (four) hours as needed., Disp: , Rfl: 0     apixaban ANTICOAGULANT (ELIQUIS ANTICOAGULANT) 5 MG tablet, Take 1 tablet (5 mg) by mouth every 12 hours, Disp: 180 tablet, Rfl: 3     atorvastatin (LIPITOR) 10 MG tablet, Take 1 tablet (10 mg) by mouth At Bedtime, Disp: 90 tablet, Rfl: 3     famotidine (PEPCID) 20 MG tablet, [FAMOTIDINE (PEPCID) 20 MG TABLET] Take 1 tablet (20 mg total) by mouth daily. (for stomach acid), Disp: , Rfl: 0     lisinopril (ZESTRIL) 2.5 MG tablet, Take 1 tablet by mouth twice daily, Disp: 180 tablet, Rfl: 3     multivitamin Chew, [MULTIVITAMIN CHEW] Chew 2 tablets daily. , Disp: , Rfl:      propranolol ER (INDERAL LA) 60 MG 24 hr capsule, Take 1 capsule (60 mg) by mouth daily, Disp: 30 capsule, Rfl: 11     sertraline (ZOLOFT) 50 MG tablet, Take 1 tablet (50 mg) by mouth daily, Disp: 90 tablet, Rfl: 3     sotalol (BETAPACE) 80 MG tablet, Take 1 tablet (80 mg) by mouth every 12 hours, Disp: 180 tablet, Rfl: 3     Allergies   Allergen Reactions     Essential Oils [External Allergen Needs Reconciliation - See Comment]  Shortness Of Breath, Cough and Headache     Hydralazine Nausea and Vomiting and Headache     Possibly. 12 hrs of Nausea and headache after one 10 mg dose     Adhesive Tape-Silicones [Adhesive Tape] Unknown     Bandaids     Codeine Unknown     Darvon [Propoxyphene] Unknown     Latex Unknown     Monistat 1 (Tioconazole) [Tioconazole] Unknown     Neurontin [Gabapentin] Unknown     Oxycodone Unknown     Paxil [Paroxetine] Unknown     Vicodin [Hydrocodone-Acetaminophen] Unknown     Citalopram Anxiety     Paroxetine Anxiety        Family History   Problem Relation Age of Onset     Breast Cancer Mother 80.00        Social History:  She  reports that she has never smoked. She has never used smokeless tobacco. She reports that she does not drink alcohol and does not use drugs.     PHYSICAL EXAM:  /76   Pulse 67   SpO2 97%      NEURO:      9/19/2023    12:00 PM   Tremor Motor Scale   Assessment Time 12:12   Medication On   DBS - Right Brain None   DBS - Left Brain None   Head 1.5   Face & Jaw 0   Voice 0   Outstretched - RIGHT 2   Outstretched - LEFT 2   Wingbeating - RIGHT 0   Wingbeating - LEFT 0   Kinetic - RIGHT 1.5   Kinetic - LEFT 1.5   Lower Limb - RIGHT 0   Lower Limb - LEFT 0   Lower Limb (Max) 0   Spiral - RIGHT 2   Spiral - LEFT 2   Handwriting 1   Dot approx - RIGHT 1   Dot approx - LEFT 1   Trunk (Standing) 0   Total Right 6.5   Total Left 6.5   Axial 1.5   TOTAL 15.5         DATA REVIEWED:  Reviewed pertinent data available in [facility]    ASSESSMENT:  77-year-old right-handed female with extensive cardiac history history of essential tremor, now status post reinitiation of propranolol.  Doing very well, she still has some residual tremors but they are not disabling level at this point.  She denies any side effects to the current dose of propranolol.    Physical examination today does show objective signs of improvement in tremors.  See TETRAS evaluation and spirals uploaded in media tab.    PLAN:  -Reviewed  impression pending patient    - We will continue supportive care for now    - Continue same dose of propranolol since she is tolerating well with providing good clinical benefit.    - In checking her vitals today she is pretty stable which is consistent with a history of no cardiovascular side effects from initiation of propranolol which is reassuring.  Should we need to make any adjustments in the future we can proceed accordingly tach teaming with her cardiologist to monitor from a cardiac standpoint.    - Otherwise we will plan on rechecking back in clinic in about 6 months.  Sooner if needed.  She was encouraged to contact us back in the meantime with any questions, concerns, any other issues in the meantime.      There are no Patient Instructions on file for this visit.      Tommie Gaytan MD (Leo) (he/him/his)   of Neurology  [facility]  Department of Neurology      Thank you for referring Abiola Hallman to our [facility] Movement Disorders Program, we appreciate the opportunity of being your partner in healthcare. Please feel free to reach out to us at any point if any questions or concerns about this visit.    Attending attestation: Today I spent a total 30 minutes on this case, with greater than 50% of the time spent in face-to-face, examining, obtaining history, providing education and coordination of care. Additional time was spent in chart review, ancillary data, and documentation as delineated above.      Again, thank you for allowing me to participate in the care of your patient.        Sincerely,        Tommie Perez MD

## 2023-09-19 NOTE — PROGRESS NOTES
Department of Neurology  Movement Disorders Division   Return Patient Visit    Patient: Abiola Hallman   MRN: [id number]   : 1945   Date of Visit: 2023  PCP: RICKEY LEWIS MD   Referring provider: No ref. provider found    CC:  Essential tremor, return to clinic    Interval history:  Ms. Hallman is a 77 year old right-handed female with history significant for significant cardiovascular disease, follows regularly with cardiologist, status post initial consultation with me back in March of this year with concerns for untreated essential tremor who presents to movement disorder clinic for regular follow-up. Last visit was 2023, with a plan to bahman with her cardiologist regarding #1 switching her off Eliquis and to warfarin, which with a goal is to start on primidone, versus #2 try her back on propranolol.    Overall she endorses a significant clinical improvement on a very low-dose of propranolol.  There was started with the clearance of a cardiologist after our visit last time and some discussions via epic messaging.  She feels that overall she is very independent in performing her activities of daily living.  She denies any tremors now while she is eating or drinking from a cup.  She is able to put her make-up on, get dressed, do buttoning and just perform overall self-care and personal hygiene.    If her daughter notices that her head does not shake anymore she has no issues while eating.    She denies any clinical symptoms concerning for symptomatic hypotension or bradycardia.  She denies any changes in mood.    She does not fusion needs any changes at this point she feels pretty optimized.      ROS:  All others negative except as listed above. Pertinent movement disorders-specific ROS listed above.    Past Medical History:   Diagnosis Date    Breast cancer (H) 2007    Hypertension     Retinal detachment     Scoliosis     sx at age 53    Stroke (H)     Tremor         Past Surgical  History:   Procedure Laterality Date    ABDOMEN SURGERY      BACK SURGERY      BIOPSY BREAST Right 01/01/2008 2011, 2012    BIOPSY BREAST Left 01/01/2007    CATARACT IOL, RT/LT Bilateral     EP PACEMAKER INSERT N/A 09/14/2020    Procedure: EP Pacemaker Insertion;  Surgeon: Cl Massey MD;  Location: [facility];  Service: Cardiology    LUMPECTOMY BREAST Left 01/01/2007    MASTECTOMY Left 01/01/2007    RETINAL REATTACHMENT Right           Current Outpatient Medications:     acetaminophen (TYLENOL) 325 MG tablet, [ACETAMINOPHEN (TYLENOL) 325 MG TABLET] Take 1 tablet (325 mg total) by mouth every 4 (four) hours as needed., Disp: , Rfl: 0    apixaban ANTICOAGULANT (ELIQUIS ANTICOAGULANT) 5 MG tablet, Take 1 tablet (5 mg) by mouth every 12 hours, Disp: 180 tablet, Rfl: 3    atorvastatin (LIPITOR) 10 MG tablet, Take 1 tablet (10 mg) by mouth At Bedtime, Disp: 90 tablet, Rfl: 3    famotidine (PEPCID) 20 MG tablet, [FAMOTIDINE (PEPCID) 20 MG TABLET] Take 1 tablet (20 mg total) by mouth daily. (for stomach acid), Disp: , Rfl: 0    lisinopril (ZESTRIL) 2.5 MG tablet, Take 1 tablet by mouth twice daily, Disp: 180 tablet, Rfl: 3    multivitamin Chew, [MULTIVITAMIN CHEW] Chew 2 tablets daily. , Disp: , Rfl:     propranolol ER (INDERAL LA) 60 MG 24 hr capsule, Take 1 capsule (60 mg) by mouth daily, Disp: 30 capsule, Rfl: 11    sertraline (ZOLOFT) 50 MG tablet, Take 1 tablet (50 mg) by mouth daily, Disp: 90 tablet, Rfl: 3    sotalol (BETAPACE) 80 MG tablet, Take 1 tablet (80 mg) by mouth every 12 hours, Disp: 180 tablet, Rfl: 3     Allergies   Allergen Reactions    Essential Oils [External Allergen Needs Reconciliation - See Comment] Shortness Of Breath, Cough and Headache    Hydralazine Nausea and Vomiting and Headache     Possibly. 12 hrs of Nausea and headache after one 10 mg dose    Adhesive Tape-Silicones [Adhesive Tape] Unknown     Bandaids    Codeine Unknown    Darvon [Propoxyphene] Unknown    Latex Unknown     Monistat 1 (Tioconazole) [Tioconazole] Unknown    Neurontin [Gabapentin] Unknown    Oxycodone Unknown    Paxil [Paroxetine] Unknown    Vicodin [Hydrocodone-Acetaminophen] Unknown    Citalopram Anxiety    Paroxetine Anxiety        Family History   Problem Relation Age of Onset    Breast Cancer Mother 80.00        Social History:  She  reports that she has never smoked. She has never used smokeless tobacco. She reports that she does not drink alcohol and does not use drugs.     PHYSICAL EXAM:  /76   Pulse 67   SpO2 97%      NEURO:      9/19/2023    12:00 PM   Tremor Motor Scale   Assessment Time 12:12   Medication On   DBS - Right Brain None   DBS - Left Brain None   Head 1.5   Face & Jaw 0   Voice 0   Outstretched - RIGHT 2   Outstretched - LEFT 2   Wingbeating - RIGHT 0   Wingbeating - LEFT 0   Kinetic - RIGHT 1.5   Kinetic - LEFT 1.5   Lower Limb - RIGHT 0   Lower Limb - LEFT 0   Lower Limb (Max) 0   Spiral - RIGHT 2   Spiral - LEFT 2   Handwriting 1   Dot approx - RIGHT 1   Dot approx - LEFT 1   Trunk (Standing) 0   Total Right 6.5   Total Left 6.5   Axial 1.5   TOTAL 15.5         DATA REVIEWED:  Reviewed pertinent data available in [facility]    ASSESSMENT:  77-year-old right-handed female with extensive cardiac history history of essential tremor, now status post reinitiation of propranolol.  Doing very well, she still has some residual tremors but they are not disabling level at this point.  She denies any side effects to the current dose of propranolol.    Physical examination today does show objective signs of improvement in tremors.  See TETRAS evaluation and spirals uploaded in media tab.    PLAN:  -Reviewed impression pending patient    - We will continue supportive care for now    - Continue same dose of propranolol since she is tolerating well with providing good clinical benefit.    - In checking her vitals today she is pretty stable which is consistent with a history of no cardiovascular side  effects from initiation of propranolol which is reassuring.  Should we need to make any adjustments in the future we can proceed accordingly tach teaming with her cardiologist to monitor from a cardiac standpoint.    - Otherwise we will plan on rechecking back in clinic in about 6 months.  Sooner if needed.  She was encouraged to contact us back in the meantime with any questions, concerns, any other issues in the meantime.      There are no Patient Instructions on file for this visit.      Tommie Gaytan MD (Leo) (he/him/his)   of Neurology  [facility]  Department of Neurology      Thank you for referring Abiola Hallman to our [facility] Movement Disorders Program, we appreciate the opportunity of being your partner in healthcare. Please feel free to reach out to us at any point if any questions or concerns about this visit.    Attending attestation: Today I spent a total 30 minutes on this case, with greater than 50% of the time spent in face-to-face, examining, obtaining history, providing education and coordination of care. Additional time was spent in chart review, ancillary data, and documentation as delineated above.

## 2023-09-19 NOTE — NURSING NOTE
"Abiola Hallman is a 77 year old female who presents for:  Chief Complaint   Patient presents with    Parkinson     Follow Up 6 Months        Initial Vitals:  /76   Pulse 67   SpO2 97%  Estimated body mass index is 30.73 kg/m  as calculated from the following:    Height as of 5/3/23: 1.626 m (5' 4\").    Weight as of 5/3/23: 81.2 kg (179 lb).. There is no height or weight on file to calculate BSA. BP completed using cuff size: regular  Data Unavailable    Nursing Comments:     Viki Torres MA   "

## 2023-10-25 ENCOUNTER — ANCILLARY PROCEDURE (OUTPATIENT)
Dept: CARDIOLOGY | Facility: CLINIC | Age: 78
End: 2023-10-25
Attending: INTERNAL MEDICINE
Payer: COMMERCIAL

## 2023-10-25 DIAGNOSIS — I49.5 SICK SINUS SYNDROME (H): ICD-10-CM

## 2023-10-25 DIAGNOSIS — Z95.0 PACEMAKER: ICD-10-CM

## 2023-10-25 PROCEDURE — 93296 REM INTERROG EVL PM/IDS: CPT | Performed by: INTERNAL MEDICINE

## 2023-10-25 PROCEDURE — 93294 REM INTERROG EVL PM/LDLS PM: CPT | Performed by: INTERNAL MEDICINE

## 2023-10-27 LAB
MDC_IDC_EPISODE_DTM: NORMAL
MDC_IDC_EPISODE_DURATION: 0 S
MDC_IDC_EPISODE_ID: 4
MDC_IDC_EPISODE_TYPE: NORMAL
MDC_IDC_LEAD_CONNECTION_STATUS: NORMAL
MDC_IDC_LEAD_CONNECTION_STATUS: NORMAL
MDC_IDC_LEAD_IMPLANT_DT: NORMAL
MDC_IDC_LEAD_IMPLANT_DT: NORMAL
MDC_IDC_LEAD_LOCATION: NORMAL
MDC_IDC_LEAD_LOCATION: NORMAL
MDC_IDC_LEAD_LOCATION_DETAIL_1: NORMAL
MDC_IDC_LEAD_LOCATION_DETAIL_1: NORMAL
MDC_IDC_LEAD_MFG: NORMAL
MDC_IDC_LEAD_MFG: NORMAL
MDC_IDC_LEAD_MODEL: NORMAL
MDC_IDC_LEAD_MODEL: NORMAL
MDC_IDC_LEAD_POLARITY_TYPE: NORMAL
MDC_IDC_LEAD_POLARITY_TYPE: NORMAL
MDC_IDC_LEAD_SERIAL: NORMAL
MDC_IDC_LEAD_SERIAL: NORMAL
MDC_IDC_LEAD_SPECIAL_FUNCTION: NORMAL
MDC_IDC_LEAD_SPECIAL_FUNCTION: NORMAL
MDC_IDC_MSMT_BATTERY_DTM: NORMAL
MDC_IDC_MSMT_BATTERY_REMAINING_LONGEVITY: 127 MO
MDC_IDC_MSMT_BATTERY_RRT_TRIGGER: 2.62
MDC_IDC_MSMT_BATTERY_STATUS: NORMAL
MDC_IDC_MSMT_BATTERY_VOLTAGE: 3.02 V
MDC_IDC_MSMT_LEADCHNL_RA_IMPEDANCE_VALUE: 342 OHM
MDC_IDC_MSMT_LEADCHNL_RA_IMPEDANCE_VALUE: 532 OHM
MDC_IDC_MSMT_LEADCHNL_RA_PACING_THRESHOLD_AMPLITUDE: 0.62 V
MDC_IDC_MSMT_LEADCHNL_RA_PACING_THRESHOLD_PULSEWIDTH: 0.4 MS
MDC_IDC_MSMT_LEADCHNL_RA_SENSING_INTR_AMPL: 2.5 MV
MDC_IDC_MSMT_LEADCHNL_RA_SENSING_INTR_AMPL: 2.5 MV
MDC_IDC_MSMT_LEADCHNL_RV_IMPEDANCE_VALUE: 361 OHM
MDC_IDC_MSMT_LEADCHNL_RV_IMPEDANCE_VALUE: 494 OHM
MDC_IDC_MSMT_LEADCHNL_RV_PACING_THRESHOLD_AMPLITUDE: 0.75 V
MDC_IDC_MSMT_LEADCHNL_RV_PACING_THRESHOLD_PULSEWIDTH: 0.4 MS
MDC_IDC_MSMT_LEADCHNL_RV_SENSING_INTR_AMPL: 8.62 MV
MDC_IDC_MSMT_LEADCHNL_RV_SENSING_INTR_AMPL: 8.62 MV
MDC_IDC_PG_IMPLANT_DTM: NORMAL
MDC_IDC_PG_MFG: NORMAL
MDC_IDC_PG_MODEL: NORMAL
MDC_IDC_PG_SERIAL: NORMAL
MDC_IDC_PG_TYPE: NORMAL
MDC_IDC_SESS_CLINIC_NAME: NORMAL
MDC_IDC_SESS_DTM: NORMAL
MDC_IDC_SESS_TYPE: NORMAL
MDC_IDC_SET_BRADY_AT_MODE_SWITCH_RATE: 171 {BEATS}/MIN
MDC_IDC_SET_BRADY_HYSTRATE: NORMAL
MDC_IDC_SET_BRADY_LOWRATE: 70 {BEATS}/MIN
MDC_IDC_SET_BRADY_MAX_SENSOR_RATE: 130 {BEATS}/MIN
MDC_IDC_SET_BRADY_MAX_TRACKING_RATE: 130 {BEATS}/MIN
MDC_IDC_SET_BRADY_MODE: NORMAL
MDC_IDC_SET_BRADY_PAV_DELAY_LOW: 180 MS
MDC_IDC_SET_BRADY_SAV_DELAY_LOW: 150 MS
MDC_IDC_SET_LEADCHNL_RA_PACING_AMPLITUDE: 1.5 V
MDC_IDC_SET_LEADCHNL_RA_PACING_ANODE_ELECTRODE_1: NORMAL
MDC_IDC_SET_LEADCHNL_RA_PACING_ANODE_LOCATION_1: NORMAL
MDC_IDC_SET_LEADCHNL_RA_PACING_CAPTURE_MODE: NORMAL
MDC_IDC_SET_LEADCHNL_RA_PACING_CATHODE_ELECTRODE_1: NORMAL
MDC_IDC_SET_LEADCHNL_RA_PACING_CATHODE_LOCATION_1: NORMAL
MDC_IDC_SET_LEADCHNL_RA_PACING_POLARITY: NORMAL
MDC_IDC_SET_LEADCHNL_RA_PACING_PULSEWIDTH: 0.4 MS
MDC_IDC_SET_LEADCHNL_RA_SENSING_ANODE_ELECTRODE_1: NORMAL
MDC_IDC_SET_LEADCHNL_RA_SENSING_ANODE_LOCATION_1: NORMAL
MDC_IDC_SET_LEADCHNL_RA_SENSING_CATHODE_ELECTRODE_1: NORMAL
MDC_IDC_SET_LEADCHNL_RA_SENSING_CATHODE_LOCATION_1: NORMAL
MDC_IDC_SET_LEADCHNL_RA_SENSING_POLARITY: NORMAL
MDC_IDC_SET_LEADCHNL_RA_SENSING_SENSITIVITY: 0.45 MV
MDC_IDC_SET_LEADCHNL_RV_PACING_AMPLITUDE: 1.5 V
MDC_IDC_SET_LEADCHNL_RV_PACING_ANODE_ELECTRODE_1: NORMAL
MDC_IDC_SET_LEADCHNL_RV_PACING_ANODE_LOCATION_1: NORMAL
MDC_IDC_SET_LEADCHNL_RV_PACING_CAPTURE_MODE: NORMAL
MDC_IDC_SET_LEADCHNL_RV_PACING_CATHODE_ELECTRODE_1: NORMAL
MDC_IDC_SET_LEADCHNL_RV_PACING_CATHODE_LOCATION_1: NORMAL
MDC_IDC_SET_LEADCHNL_RV_PACING_POLARITY: NORMAL
MDC_IDC_SET_LEADCHNL_RV_PACING_PULSEWIDTH: 0.4 MS
MDC_IDC_SET_LEADCHNL_RV_SENSING_ANODE_ELECTRODE_1: NORMAL
MDC_IDC_SET_LEADCHNL_RV_SENSING_ANODE_LOCATION_1: NORMAL
MDC_IDC_SET_LEADCHNL_RV_SENSING_CATHODE_ELECTRODE_1: NORMAL
MDC_IDC_SET_LEADCHNL_RV_SENSING_CATHODE_LOCATION_1: NORMAL
MDC_IDC_SET_LEADCHNL_RV_SENSING_POLARITY: NORMAL
MDC_IDC_SET_LEADCHNL_RV_SENSING_SENSITIVITY: 0.9 MV
MDC_IDC_SET_ZONE_DETECTION_INTERVAL: 200 MS
MDC_IDC_SET_ZONE_DETECTION_INTERVAL: 350 MS
MDC_IDC_SET_ZONE_DETECTION_INTERVAL: 400 MS
MDC_IDC_SET_ZONE_STATUS: NORMAL
MDC_IDC_SET_ZONE_TYPE: NORMAL
MDC_IDC_SET_ZONE_VENDOR_TYPE: NORMAL
MDC_IDC_STAT_AT_BURDEN_PERCENT: 0 %
MDC_IDC_STAT_AT_DTM_END: NORMAL
MDC_IDC_STAT_AT_DTM_START: NORMAL
MDC_IDC_STAT_BRADY_AP_VP_PERCENT: 0.03 %
MDC_IDC_STAT_BRADY_AP_VS_PERCENT: 92.21 %
MDC_IDC_STAT_BRADY_AS_VP_PERCENT: 0.01 %
MDC_IDC_STAT_BRADY_AS_VS_PERCENT: 7.75 %
MDC_IDC_STAT_BRADY_DTM_END: NORMAL
MDC_IDC_STAT_BRADY_DTM_START: NORMAL
MDC_IDC_STAT_BRADY_RA_PERCENT_PACED: 91.38 %
MDC_IDC_STAT_BRADY_RV_PERCENT_PACED: 0.04 %
MDC_IDC_STAT_EPISODE_RECENT_COUNT: 0
MDC_IDC_STAT_EPISODE_RECENT_COUNT: 1
MDC_IDC_STAT_EPISODE_RECENT_COUNT_DTM_END: NORMAL
MDC_IDC_STAT_EPISODE_RECENT_COUNT_DTM_START: NORMAL
MDC_IDC_STAT_EPISODE_TOTAL_COUNT: 0
MDC_IDC_STAT_EPISODE_TOTAL_COUNT: 2
MDC_IDC_STAT_EPISODE_TOTAL_COUNT: 2
MDC_IDC_STAT_EPISODE_TOTAL_COUNT_DTM_END: NORMAL
MDC_IDC_STAT_EPISODE_TOTAL_COUNT_DTM_START: NORMAL
MDC_IDC_STAT_EPISODE_TYPE: NORMAL
MDC_IDC_STAT_TACHYTHERAPY_RECENT_DTM_END: NORMAL
MDC_IDC_STAT_TACHYTHERAPY_RECENT_DTM_START: NORMAL
MDC_IDC_STAT_TACHYTHERAPY_TOTAL_DTM_END: NORMAL
MDC_IDC_STAT_TACHYTHERAPY_TOTAL_DTM_START: NORMAL

## 2024-02-05 ENCOUNTER — ANCILLARY PROCEDURE (OUTPATIENT)
Dept: CARDIOLOGY | Facility: CLINIC | Age: 79
End: 2024-02-05
Attending: INTERNAL MEDICINE
Payer: COMMERCIAL

## 2024-02-05 DIAGNOSIS — Z95.0 PACEMAKER: ICD-10-CM

## 2024-02-05 DIAGNOSIS — I49.5 SICK SINUS SYNDROME (H): ICD-10-CM

## 2024-02-05 LAB
MDC_IDC_EPISODE_DTM: NORMAL
MDC_IDC_EPISODE_DURATION: 4168 S
MDC_IDC_EPISODE_DURATION: 4304 S
MDC_IDC_EPISODE_DURATION: 5656 S
MDC_IDC_EPISODE_DURATION: 619 S
MDC_IDC_EPISODE_ID: 5
MDC_IDC_EPISODE_ID: 6
MDC_IDC_EPISODE_ID: 7
MDC_IDC_EPISODE_ID: 8
MDC_IDC_EPISODE_TYPE: NORMAL
MDC_IDC_LEAD_CONNECTION_STATUS: NORMAL
MDC_IDC_LEAD_CONNECTION_STATUS: NORMAL
MDC_IDC_LEAD_IMPLANT_DT: NORMAL
MDC_IDC_LEAD_IMPLANT_DT: NORMAL
MDC_IDC_LEAD_LOCATION: NORMAL
MDC_IDC_LEAD_LOCATION: NORMAL
MDC_IDC_LEAD_LOCATION_DETAIL_1: NORMAL
MDC_IDC_LEAD_LOCATION_DETAIL_1: NORMAL
MDC_IDC_LEAD_MFG: NORMAL
MDC_IDC_LEAD_MFG: NORMAL
MDC_IDC_LEAD_MODEL: NORMAL
MDC_IDC_LEAD_MODEL: NORMAL
MDC_IDC_LEAD_POLARITY_TYPE: NORMAL
MDC_IDC_LEAD_POLARITY_TYPE: NORMAL
MDC_IDC_LEAD_SERIAL: NORMAL
MDC_IDC_LEAD_SERIAL: NORMAL
MDC_IDC_LEAD_SPECIAL_FUNCTION: NORMAL
MDC_IDC_LEAD_SPECIAL_FUNCTION: NORMAL
MDC_IDC_MSMT_BATTERY_DTM: NORMAL
MDC_IDC_MSMT_BATTERY_REMAINING_LONGEVITY: 122 MO
MDC_IDC_MSMT_BATTERY_RRT_TRIGGER: 2.62
MDC_IDC_MSMT_BATTERY_STATUS: NORMAL
MDC_IDC_MSMT_BATTERY_VOLTAGE: 3.02 V
MDC_IDC_MSMT_LEADCHNL_RA_IMPEDANCE_VALUE: 361 OHM
MDC_IDC_MSMT_LEADCHNL_RA_IMPEDANCE_VALUE: 475 OHM
MDC_IDC_MSMT_LEADCHNL_RA_PACING_THRESHOLD_AMPLITUDE: 0.5 V
MDC_IDC_MSMT_LEADCHNL_RA_PACING_THRESHOLD_PULSEWIDTH: 0.4 MS
MDC_IDC_MSMT_LEADCHNL_RA_SENSING_INTR_AMPL: 2.62 MV
MDC_IDC_MSMT_LEADCHNL_RA_SENSING_INTR_AMPL: 2.62 MV
MDC_IDC_MSMT_LEADCHNL_RV_IMPEDANCE_VALUE: 399 OHM
MDC_IDC_MSMT_LEADCHNL_RV_IMPEDANCE_VALUE: 475 OHM
MDC_IDC_MSMT_LEADCHNL_RV_PACING_THRESHOLD_AMPLITUDE: 0.62 V
MDC_IDC_MSMT_LEADCHNL_RV_PACING_THRESHOLD_PULSEWIDTH: 0.4 MS
MDC_IDC_MSMT_LEADCHNL_RV_SENSING_INTR_AMPL: 4 MV
MDC_IDC_MSMT_LEADCHNL_RV_SENSING_INTR_AMPL: 4 MV
MDC_IDC_PG_IMPLANT_DTM: NORMAL
MDC_IDC_PG_MFG: NORMAL
MDC_IDC_PG_MODEL: NORMAL
MDC_IDC_PG_SERIAL: NORMAL
MDC_IDC_PG_TYPE: NORMAL
MDC_IDC_SESS_CLINIC_NAME: NORMAL
MDC_IDC_SESS_DTM: NORMAL
MDC_IDC_SESS_TYPE: NORMAL
MDC_IDC_SET_BRADY_AT_MODE_SWITCH_RATE: 171 {BEATS}/MIN
MDC_IDC_SET_BRADY_HYSTRATE: NORMAL
MDC_IDC_SET_BRADY_LOWRATE: 70 {BEATS}/MIN
MDC_IDC_SET_BRADY_MAX_SENSOR_RATE: 130 {BEATS}/MIN
MDC_IDC_SET_BRADY_MAX_TRACKING_RATE: 130 {BEATS}/MIN
MDC_IDC_SET_BRADY_MODE: NORMAL
MDC_IDC_SET_BRADY_PAV_DELAY_LOW: 180 MS
MDC_IDC_SET_BRADY_SAV_DELAY_LOW: 150 MS
MDC_IDC_SET_LEADCHNL_RA_PACING_AMPLITUDE: 1.5 V
MDC_IDC_SET_LEADCHNL_RA_PACING_ANODE_ELECTRODE_1: NORMAL
MDC_IDC_SET_LEADCHNL_RA_PACING_ANODE_LOCATION_1: NORMAL
MDC_IDC_SET_LEADCHNL_RA_PACING_CAPTURE_MODE: NORMAL
MDC_IDC_SET_LEADCHNL_RA_PACING_CATHODE_ELECTRODE_1: NORMAL
MDC_IDC_SET_LEADCHNL_RA_PACING_CATHODE_LOCATION_1: NORMAL
MDC_IDC_SET_LEADCHNL_RA_PACING_POLARITY: NORMAL
MDC_IDC_SET_LEADCHNL_RA_PACING_PULSEWIDTH: 0.4 MS
MDC_IDC_SET_LEADCHNL_RA_SENSING_ANODE_ELECTRODE_1: NORMAL
MDC_IDC_SET_LEADCHNL_RA_SENSING_ANODE_LOCATION_1: NORMAL
MDC_IDC_SET_LEADCHNL_RA_SENSING_CATHODE_ELECTRODE_1: NORMAL
MDC_IDC_SET_LEADCHNL_RA_SENSING_CATHODE_LOCATION_1: NORMAL
MDC_IDC_SET_LEADCHNL_RA_SENSING_POLARITY: NORMAL
MDC_IDC_SET_LEADCHNL_RA_SENSING_SENSITIVITY: 0.45 MV
MDC_IDC_SET_LEADCHNL_RV_PACING_AMPLITUDE: 1.5 V
MDC_IDC_SET_LEADCHNL_RV_PACING_ANODE_ELECTRODE_1: NORMAL
MDC_IDC_SET_LEADCHNL_RV_PACING_ANODE_LOCATION_1: NORMAL
MDC_IDC_SET_LEADCHNL_RV_PACING_CAPTURE_MODE: NORMAL
MDC_IDC_SET_LEADCHNL_RV_PACING_CATHODE_ELECTRODE_1: NORMAL
MDC_IDC_SET_LEADCHNL_RV_PACING_CATHODE_LOCATION_1: NORMAL
MDC_IDC_SET_LEADCHNL_RV_PACING_POLARITY: NORMAL
MDC_IDC_SET_LEADCHNL_RV_PACING_PULSEWIDTH: 0.4 MS
MDC_IDC_SET_LEADCHNL_RV_SENSING_ANODE_ELECTRODE_1: NORMAL
MDC_IDC_SET_LEADCHNL_RV_SENSING_ANODE_LOCATION_1: NORMAL
MDC_IDC_SET_LEADCHNL_RV_SENSING_CATHODE_ELECTRODE_1: NORMAL
MDC_IDC_SET_LEADCHNL_RV_SENSING_CATHODE_LOCATION_1: NORMAL
MDC_IDC_SET_LEADCHNL_RV_SENSING_POLARITY: NORMAL
MDC_IDC_SET_LEADCHNL_RV_SENSING_SENSITIVITY: 0.9 MV
MDC_IDC_SET_ZONE_DETECTION_INTERVAL: 200 MS
MDC_IDC_SET_ZONE_DETECTION_INTERVAL: 350 MS
MDC_IDC_SET_ZONE_DETECTION_INTERVAL: 400 MS
MDC_IDC_SET_ZONE_STATUS: NORMAL
MDC_IDC_SET_ZONE_TYPE: NORMAL
MDC_IDC_SET_ZONE_VENDOR_TYPE: NORMAL
MDC_IDC_STAT_AT_BURDEN_PERCENT: 0.2 %
MDC_IDC_STAT_AT_DTM_END: NORMAL
MDC_IDC_STAT_AT_DTM_START: NORMAL
MDC_IDC_STAT_BRADY_AP_VP_PERCENT: 0.03 %
MDC_IDC_STAT_BRADY_AP_VS_PERCENT: 92.05 %
MDC_IDC_STAT_BRADY_AS_VP_PERCENT: 0 %
MDC_IDC_STAT_BRADY_AS_VS_PERCENT: 7.92 %
MDC_IDC_STAT_BRADY_DTM_END: NORMAL
MDC_IDC_STAT_BRADY_DTM_START: NORMAL
MDC_IDC_STAT_BRADY_RA_PERCENT_PACED: 91.15 %
MDC_IDC_STAT_BRADY_RV_PERCENT_PACED: 0.07 %
MDC_IDC_STAT_EPISODE_RECENT_COUNT: 0
MDC_IDC_STAT_EPISODE_RECENT_COUNT: 4
MDC_IDC_STAT_EPISODE_RECENT_COUNT_DTM_END: NORMAL
MDC_IDC_STAT_EPISODE_RECENT_COUNT_DTM_START: NORMAL
MDC_IDC_STAT_EPISODE_TOTAL_COUNT: 0
MDC_IDC_STAT_EPISODE_TOTAL_COUNT: 2
MDC_IDC_STAT_EPISODE_TOTAL_COUNT: 6
MDC_IDC_STAT_EPISODE_TOTAL_COUNT_DTM_END: NORMAL
MDC_IDC_STAT_EPISODE_TOTAL_COUNT_DTM_START: NORMAL
MDC_IDC_STAT_EPISODE_TYPE: NORMAL
MDC_IDC_STAT_TACHYTHERAPY_RECENT_DTM_END: NORMAL
MDC_IDC_STAT_TACHYTHERAPY_RECENT_DTM_START: NORMAL
MDC_IDC_STAT_TACHYTHERAPY_TOTAL_DTM_END: NORMAL
MDC_IDC_STAT_TACHYTHERAPY_TOTAL_DTM_START: NORMAL

## 2024-02-05 PROCEDURE — 93296 REM INTERROG EVL PM/IDS: CPT | Performed by: INTERNAL MEDICINE

## 2024-02-05 PROCEDURE — 93294 REM INTERROG EVL PM/LDLS PM: CPT | Performed by: INTERNAL MEDICINE

## 2024-02-06 DIAGNOSIS — E78.5 HYPERLIPIDEMIA LDL GOAL <70: ICD-10-CM

## 2024-02-06 RX ORDER — ATORVASTATIN CALCIUM 10 MG/1
10 TABLET, FILM COATED ORAL AT BEDTIME
Qty: 90 TABLET | Refills: 0 | Status: SHIPPED | OUTPATIENT
Start: 2024-02-06 | End: 2024-05-01

## 2024-03-04 DIAGNOSIS — F32.4 MAJOR DEPRESSIVE DISORDER IN PARTIAL REMISSION, UNSPECIFIED WHETHER RECURRENT (H): ICD-10-CM

## 2024-03-08 ENCOUNTER — TELEPHONE (OUTPATIENT)
Dept: NEUROLOGY | Facility: CLINIC | Age: 79
End: 2024-03-08
Payer: COMMERCIAL

## 2024-03-08 NOTE — TELEPHONE ENCOUNTER
Attempted to reach patient to remind them about appointment scheduled with Tommie Perez MD on 3/11/24 in our [place] location.  A voicemail was left with a call back number if the patient has questions or would like to reschedule.

## 2024-03-09 ENCOUNTER — TELEPHONE (OUTPATIENT)
Dept: CARDIOLOGY | Facility: CLINIC | Age: 79
End: 2024-03-09
Payer: COMMERCIAL

## 2024-03-09 NOTE — TELEPHONE ENCOUNTER
Patient called with recurrent nosebleeds over the last several days.  Has dental appointment first part of next week.  Has not been to a dentist for several years.  No recent episodes of atrial fibrillation.  Advised to skip 1 dose of Eliquis and then resume, continue local measures to control bleeding.  Encouraged follow-up evaluation to discuss whether Watchman device might be a good option for her.  Sean Sam MD

## 2024-03-11 ENCOUNTER — OFFICE VISIT (OUTPATIENT)
Dept: NEUROLOGY | Facility: CLINIC | Age: 79
End: 2024-03-11
Payer: COMMERCIAL

## 2024-03-11 VITALS — SYSTOLIC BLOOD PRESSURE: 124 MMHG | OXYGEN SATURATION: 96 % | HEART RATE: 78 BPM | DIASTOLIC BLOOD PRESSURE: 85 MMHG

## 2024-03-11 DIAGNOSIS — G25.0 BENIGN ESSENTIAL TREMOR: Primary | ICD-10-CM

## 2024-03-11 PROCEDURE — 99214 OFFICE O/P EST MOD 30 MIN: CPT | Performed by: PSYCHIATRY & NEUROLOGY

## 2024-03-11 NOTE — LETTER
3/11/2024         RE: Abiola Hallman  [address] [place] [address]        Dear Colleague,    Thank you for referring your patient, Abiola Hallman, to the [facility] NEUROLOGY [facility]. Please see a copy of my visit note below.    Department of Neurology  Movement Disorders Division   Return Patient Visit    Patient: Abiola Hallman   MRN: [id number]   : 1945   Date of Visit: 2024  PCP: RICKEY LEWIS MD   Referring provider: No ref. provider found    CC:  Return for essential tremor    Interval history:  Ms. Hallman is a 78 year old right-handed female with history significant for multiple medical issues including essential tremor who presents to movement disorder clinic for regular follow-up. Last visit was 2023, with a plan to continue propranolol since he was doing well.     Overall she continues to do well and denies any interval worsening of tremors.  She has had multiple issues regarding her life since last time she was seen here, her  had a fall and had a hip fracture she had to care for him while he underwent hip replacement, she has also had issues recently with nosebleeds in the context of being on Eliquis, and things had to be switched around.  Despite all that she denies any dose stressors actually interfering with her tremors.  She feels the propranolol is still doing a good job.    She denies any side effects from propranolol, denies any orthostasis nor any mood changes.    No new complaints to be discussed today.    ROS:  All others negative except as listed above. Pertinent movement disorders-specific ROS listed above.    Past Medical History:   Diagnosis Date     Breast cancer (H) 2007     Hypertension      Retinal detachment      Scoliosis     sx at age 53     Stroke (H)      Tremor         Past Surgical History:   Procedure Laterality Date     ABDOMEN SURGERY       BACK SURGERY       BIOPSY BREAST Right 2008,       BIOPSY BREAST Left 01/01/2007     CATARACT IOL, RT/LT Bilateral      EP PACEMAKER INSERT N/A 09/14/2020    Procedure: EP Pacemaker Insertion;  Surgeon: Cl Massey MD;  Location: [facility];  Service: Cardiology     LUMPECTOMY BREAST Left 01/01/2007     MASTECTOMY Left 01/01/2007     RETINAL REATTACHMENT Right           Current Outpatient Medications:      acetaminophen (TYLENOL) 325 MG tablet, [ACETAMINOPHEN (TYLENOL) 325 MG TABLET] Take 1 tablet (325 mg total) by mouth every 4 (four) hours as needed., Disp: , Rfl: 0     atorvastatin (LIPITOR) 10 MG tablet, TAKE 1 TABLET BY MOUTH AT BEDTIME, Disp: 90 tablet, Rfl: 0     famotidine (PEPCID) 20 MG tablet, [FAMOTIDINE (PEPCID) 20 MG TABLET] Take 1 tablet (20 mg total) by mouth daily. (for stomach acid), Disp: , Rfl: 0     lisinopril (ZESTRIL) 2.5 MG tablet, Take 1 tablet by mouth twice daily, Disp: 180 tablet, Rfl: 3     multivitamin Chew, [MULTIVITAMIN CHEW] Chew 2 tablets daily. , Disp: , Rfl:      propranolol ER (INDERAL LA) 60 MG 24 hr capsule, Take 1 capsule (60 mg) by mouth daily, Disp: 30 capsule, Rfl: 11     sertraline (ZOLOFT) 50 MG tablet, Take 1 tablet by mouth once daily, Disp: 90 tablet, Rfl: 3     sotalol (BETAPACE) 80 MG tablet, Take 1 tablet (80 mg) by mouth every 12 hours, Disp: 180 tablet, Rfl: 3     apixaban ANTICOAGULANT (ELIQUIS ANTICOAGULANT) 5 MG tablet, Take 1 tablet (5 mg) by mouth every 12 hours (Patient not taking: Reported on 3/11/2024), Disp: 180 tablet, Rfl: 3     Allergies   Allergen Reactions     Essential Oils [External Allergen Needs Reconciliation - See Comment] Shortness Of Breath, Cough and Headache     Hydralazine Nausea and Vomiting and Headache     Possibly. 12 hrs of Nausea and headache after one 10 mg dose     Adhesive Tape-Silicones [Adhesive Tape] Unknown     Bandaids     Codeine Unknown     Darvon [Propoxyphene] Unknown     Latex Unknown     Monistat 1 (Tioconazole) [Tioconazole] Unknown     Neurontin  [Gabapentin] Unknown     Oxycodone Unknown     Paxil [Paroxetine] Unknown     Vicodin [Hydrocodone-Acetaminophen] Unknown     Citalopram Anxiety     Paroxetine Anxiety        Family History   Problem Relation Age of Onset     Breast Cancer Mother 80.00        Social History:  She  reports that she has never smoked. She has never used smokeless tobacco. She reports that she does not drink alcohol and does not use drugs.     PHYSICAL EXAM:  /85   Pulse 78   SpO2 96%      NEURO:  Tremor Scale - Portions of TETRAS Performance Subscale used with permission of [employer]  Assessment Time: 1440  Medication: On  DBS - Right Brain: None  DBS - Left Brain: None  Head: 1.5  Face & Jaw: 0  none  Voice: 0  none  Outstretched - RIGHT: 2  < 3 cm  Outstretched - LEFT: 1.5  < 1 cm  Wingbeating - RIGHT: 0  none  Wingbeating - LEFT: 0  none  Kinetic - RIGHT: 1.5  < 1 cm  Kinetic - LEFT: 1.5  < 1 cm  Lower Limb - RIGHT: 0  none in either  Lower Limb - LEFT: 0  none in either  Lower Limb (Max): 0  Spiral - RIGHT: 2  mild, obvious  Spiral - LEFT: 2  mild, obvious  Handwritin  slight, untidy, tremor barely visible  Dot approx - RIGHT: 1  barely visible  Dot approx - LEFT: 1  barely visible  Trunk (Standing): 0  none  Total Right: 6.5  Total Left: 6  Axial: 1.5  TOTAL: 15    DATA REVIEWED:  Reviewed pertinent data available in [facility].    ASSESSMENT:  78-year-old female with longstanding essential tremor, on a very low-dose of propranolol but doing well, still has some residual tremors, but denies any interference of them in her activities of daily living.  No side effects to current therapy.    PLAN:  -Reviewed impression plan with patient    - Continue supportive care.    - Continue the same dose of propranolol for now.    - We will continue to monitor her for side effects moving forward.    - Will continue to follow along.  Since she is doing well from now we will set up her next appointment in about 6 months.   Sooner if needed.  She was encouraged to contact them back in the meantime should there be any questions, concerns, any others that may arise until then.    There are no Patient Instructions on file for this visit.      Tommie Gaytan MD (Leo) (he/him/his)   of Neurology  [facility]  Department of Neurology      Thank you for referring Abiola Hallman to our [facility] Movement Disorders Program, we appreciate the opportunity of being your partner in healthcare. Please feel free to reach out to us at any point if any questions or concerns about this visit.    Attending attestation: Today I spent a total 30 minutes on this case, with greater than 50% of the time spent in face-to-face, examining, obtaining history, providing education and coordination of care. Additional time was spent in chart review, ancillary data, and documentation as delineated above.      Again, thank you for allowing me to participate in the care of your patient.        Sincerely,        Tommie Perez MD

## 2024-03-11 NOTE — NURSING NOTE
"Abiola Hallman is a 78 year old female who presents for:  Chief Complaint   Patient presents with    Parkinson     Follow Up 6 Months        Initial Vitals:  /85   Pulse 78   SpO2 96%  Estimated body mass index is 30.73 kg/m  as calculated from the following:    Height as of 5/3/23: 1.626 m (5' 4\").    Weight as of 5/3/23: 81.2 kg (179 lb).. There is no height or weight on file to calculate BSA. BP completed using cuff size: regular  Data Unavailable    Nursing Comments:     Viki Torres MA   "

## 2024-03-11 NOTE — PROGRESS NOTES
Department of Neurology  Movement Disorders Division   Return Patient Visit    Patient: Abiola Hallman   MRN: [id number]   : 1945   Date of Visit: 2024  PCP: RICKEY LEWIS MD   Referring provider: No ref. provider found    CC:  Return for essential tremor    Interval history:  Ms. Hallman is a 78 year old right-handed female with history significant for multiple medical issues including essential tremor who presents to movement disorder clinic for regular follow-up. Last visit was 2023, with a plan to continue propranolol since he was doing well.     Overall she continues to do well and denies any interval worsening of tremors.  She has had multiple issues regarding her life since last time she was seen here, her  had a fall and had a hip fracture she had to care for him while he underwent hip replacement, she has also had issues recently with nosebleeds in the context of being on Eliquis, and things had to be switched around.  Despite all that she denies any dose stressors actually interfering with her tremors.  She feels the propranolol is still doing a good job.    She denies any side effects from propranolol, denies any orthostasis nor any mood changes.    No new complaints to be discussed today.    ROS:  All others negative except as listed above. Pertinent movement disorders-specific ROS listed above.    Past Medical History:   Diagnosis Date    Breast cancer (H) 2007    Hypertension     Retinal detachment     Scoliosis     sx at age 53    Stroke (H)     Tremor         Past Surgical History:   Procedure Laterality Date    ABDOMEN SURGERY      BACK SURGERY      BIOPSY BREAST Right 2008,     BIOPSY BREAST Left 2007    CATARACT IOL, RT/LT Bilateral     EP PACEMAKER INSERT N/A 2020    Procedure: EP Pacemaker Insertion;  Surgeon: Cl Massey MD;  Location: [facility] [place];  Service: Cardiology    LUMPECTOMY BREAST Left 2007     MASTECTOMY Left 01/01/2007    RETINAL REATTACHMENT Right           Current Outpatient Medications:     acetaminophen (TYLENOL) 325 MG tablet, [ACETAMINOPHEN (TYLENOL) 325 MG TABLET] Take 1 tablet (325 mg total) by mouth every 4 (four) hours as needed., Disp: , Rfl: 0    atorvastatin (LIPITOR) 10 MG tablet, TAKE 1 TABLET BY MOUTH AT BEDTIME, Disp: 90 tablet, Rfl: 0    famotidine (PEPCID) 20 MG tablet, [FAMOTIDINE (PEPCID) 20 MG TABLET] Take 1 tablet (20 mg total) by mouth daily. (for stomach acid), Disp: , Rfl: 0    lisinopril (ZESTRIL) 2.5 MG tablet, Take 1 tablet by mouth twice daily, Disp: 180 tablet, Rfl: 3    multivitamin Chew, [MULTIVITAMIN CHEW] Chew 2 tablets daily. , Disp: , Rfl:     propranolol ER (INDERAL LA) 60 MG 24 hr capsule, Take 1 capsule (60 mg) by mouth daily, Disp: 30 capsule, Rfl: 11    sertraline (ZOLOFT) 50 MG tablet, Take 1 tablet by mouth once daily, Disp: 90 tablet, Rfl: 3    sotalol (BETAPACE) 80 MG tablet, Take 1 tablet (80 mg) by mouth every 12 hours, Disp: 180 tablet, Rfl: 3    apixaban ANTICOAGULANT (ELIQUIS ANTICOAGULANT) 5 MG tablet, Take 1 tablet (5 mg) by mouth every 12 hours (Patient not taking: Reported on 3/11/2024), Disp: 180 tablet, Rfl: 3     Allergies   Allergen Reactions    Essential Oils [External Allergen Needs Reconciliation - See Comment] Shortness Of Breath, Cough and Headache    Hydralazine Nausea and Vomiting and Headache     Possibly. 12 hrs of Nausea and headache after one 10 mg dose    Adhesive Tape-Silicones [Adhesive Tape] Unknown     Bandaids    Codeine Unknown    Darvon [Propoxyphene] Unknown    Latex Unknown    Monistat 1 (Tioconazole) [Tioconazole] Unknown    Neurontin [Gabapentin] Unknown    Oxycodone Unknown    Paxil [Paroxetine] Unknown    Vicodin [Hydrocodone-Acetaminophen] Unknown    Citalopram Anxiety    Paroxetine Anxiety        Family History   Problem Relation Age of Onset    Breast Cancer Mother 80.00        Social History:  She  reports that she  has never smoked. She has never used smokeless tobacco. She reports that she does not drink alcohol and does not use drugs.     PHYSICAL EXAM:  /85   Pulse 78   SpO2 96%      NEURO:  Tremor Scale - Portions of TETRAS Performance Subscale used with permission of [employer]  Assessment Time: 1440  Medication: On  DBS - Right Brain: None  DBS - Left Brain: None  Head: 1.5  Face & Jaw: 0  none  Voice: 0  none  Outstretched - RIGHT: 2  < 3 cm  Outstretched - LEFT: 1.5  < 1 cm  Wingbeating - RIGHT: 0  none  Wingbeating - LEFT: 0  none  Kinetic - RIGHT: 1.5  < 1 cm  Kinetic - LEFT: 1.5  < 1 cm  Lower Limb - RIGHT: 0  none in either  Lower Limb - LEFT: 0  none in either  Lower Limb (Max): 0  Spiral - RIGHT: 2  mild, obvious  Spiral - LEFT: 2  mild, obvious  Handwritin  slight, untidy, tremor barely visible  Dot approx - RIGHT: 1  barely visible  Dot approx - LEFT: 1  barely visible  Trunk (Standing): 0  none  Total Right: 6.5  Total Left: 6  Axial: 1.5  TOTAL: 15    DATA REVIEWED:  Reviewed pertinent data available in [facility].    ASSESSMENT:  78-year-old female with longstanding essential tremor, on a very low-dose of propranolol but doing well, still has some residual tremors, but denies any interference of them in her activities of daily living.  No side effects to current therapy.    PLAN:  -Reviewed impression plan with patient    - Continue supportive care.    - Continue the same dose of propranolol for now.    - We will continue to monitor her for side effects moving forward.    - Will continue to follow along.  Since she is doing well from now we will set up her next appointment in about 6 months.  Sooner if needed.  She was encouraged to contact them back in the meantime should there be any questions, concerns, any others that may arise until then.    There are no Patient Instructions on file for this visit.      Tommie Gaytan MD (Leo) (he/him/his)   of  Neurology  [facility]  Department of Neurology      Thank you for referring Abiola Hallman to our [facility] Movement Disorders Program, we appreciate the opportunity of being your partner in healthcare. Please feel free to reach out to us at any point if any questions or concerns about this visit.    Attending attestation: Today I spent a total 30 minutes on this case, with greater than 50% of the time spent in face-to-face, examining, obtaining history, providing education and coordination of care. Additional time was spent in chart review, ancillary data, and documentation as delineated above.

## 2024-03-17 ENCOUNTER — HEALTH MAINTENANCE LETTER (OUTPATIENT)
Age: 79
End: 2024-03-17

## 2024-03-28 ASSESSMENT — SOCIAL DETERMINANTS OF HEALTH (SDOH): HOW OFTEN DO YOU GET TOGETHER WITH FRIENDS OR RELATIVES?: MORE THAN THREE TIMES A WEEK

## 2024-04-02 ENCOUNTER — OFFICE VISIT (OUTPATIENT)
Dept: INTERNAL MEDICINE | Facility: CLINIC | Age: 79
End: 2024-04-02
Payer: COMMERCIAL

## 2024-04-02 VITALS
OXYGEN SATURATION: 97 % | WEIGHT: 184 LBS | SYSTOLIC BLOOD PRESSURE: 118 MMHG | HEART RATE: 84 BPM | BODY MASS INDEX: 31.41 KG/M2 | TEMPERATURE: 98.1 F | DIASTOLIC BLOOD PRESSURE: 74 MMHG | RESPIRATION RATE: 16 BRPM | HEIGHT: 64 IN

## 2024-04-02 DIAGNOSIS — Z00.00 ROUTINE GENERAL MEDICAL EXAMINATION AT A HEALTH CARE FACILITY: Primary | ICD-10-CM

## 2024-04-02 DIAGNOSIS — E78.5 HYPERLIPIDEMIA LDL GOAL <70: ICD-10-CM

## 2024-04-02 DIAGNOSIS — Z95.0 CARDIAC PACEMAKER IN SITU: ICD-10-CM

## 2024-04-02 DIAGNOSIS — F32.4 MAJOR DEPRESSIVE DISORDER IN PARTIAL REMISSION, UNSPECIFIED WHETHER RECURRENT (H): ICD-10-CM

## 2024-04-02 DIAGNOSIS — Z79.01 CHRONIC ANTICOAGULATION: ICD-10-CM

## 2024-04-02 DIAGNOSIS — G25.0 ESSENTIAL TREMOR: ICD-10-CM

## 2024-04-02 LAB
ALBUMIN SERPL BCG-MCNC: 4.3 G/DL (ref 3.5–5.2)
ALP SERPL-CCNC: 73 U/L (ref 40–150)
ALT SERPL W P-5'-P-CCNC: 21 U/L (ref 0–50)
ANION GAP SERPL CALCULATED.3IONS-SCNC: 8 MMOL/L (ref 7–15)
AST SERPL W P-5'-P-CCNC: 34 U/L (ref 0–45)
BILIRUB SERPL-MCNC: 0.5 MG/DL
BUN SERPL-MCNC: 19.9 MG/DL (ref 8–23)
CALCIUM SERPL-MCNC: 9.9 MG/DL (ref 8.8–10.2)
CHLORIDE SERPL-SCNC: 103 MMOL/L (ref 98–107)
CHOLEST SERPL-MCNC: 143 MG/DL
CREAT SERPL-MCNC: 1.08 MG/DL (ref 0.51–0.95)
DEPRECATED HCO3 PLAS-SCNC: 27 MMOL/L (ref 22–29)
EGFRCR SERPLBLD CKD-EPI 2021: 52 ML/MIN/1.73M2
ERYTHROCYTE [DISTWIDTH] IN BLOOD BY AUTOMATED COUNT: 12.7 % (ref 10–15)
FASTING STATUS PATIENT QL REPORTED: YES
GLUCOSE SERPL-MCNC: 97 MG/DL (ref 70–99)
HBA1C MFR BLD: 5.4 % (ref 0–5.6)
HCT VFR BLD AUTO: 43.6 % (ref 35–47)
HDLC SERPL-MCNC: 52 MG/DL
HGB BLD-MCNC: 14 G/DL (ref 11.7–15.7)
LDLC SERPL CALC-MCNC: 64 MG/DL
MCH RBC QN AUTO: 30.4 PG (ref 26.5–33)
MCHC RBC AUTO-ENTMCNC: 32.1 G/DL (ref 31.5–36.5)
MCV RBC AUTO: 95 FL (ref 78–100)
NONHDLC SERPL-MCNC: 91 MG/DL
PLATELET # BLD AUTO: 267 10E3/UL (ref 150–450)
POTASSIUM SERPL-SCNC: 4.7 MMOL/L (ref 3.4–5.3)
PROT SERPL-MCNC: 6.9 G/DL (ref 6.4–8.3)
RBC # BLD AUTO: 4.6 10E6/UL (ref 3.8–5.2)
SODIUM SERPL-SCNC: 138 MMOL/L (ref 135–145)
TRIGL SERPL-MCNC: 134 MG/DL
TSH SERPL DL<=0.005 MIU/L-ACNC: 1.64 UIU/ML (ref 0.3–4.2)
WBC # BLD AUTO: 8.2 10E3/UL (ref 4–11)

## 2024-04-02 PROCEDURE — 99214 OFFICE O/P EST MOD 30 MIN: CPT | Mod: 25 | Performed by: INTERNAL MEDICINE

## 2024-04-02 PROCEDURE — 84443 ASSAY THYROID STIM HORMONE: CPT | Performed by: INTERNAL MEDICINE

## 2024-04-02 PROCEDURE — 36415 COLL VENOUS BLD VENIPUNCTURE: CPT | Performed by: INTERNAL MEDICINE

## 2024-04-02 PROCEDURE — 80061 LIPID PANEL: CPT | Performed by: INTERNAL MEDICINE

## 2024-04-02 PROCEDURE — 80053 COMPREHEN METABOLIC PANEL: CPT | Performed by: INTERNAL MEDICINE

## 2024-04-02 PROCEDURE — 85027 COMPLETE CBC AUTOMATED: CPT | Performed by: INTERNAL MEDICINE

## 2024-04-02 PROCEDURE — 83036 HEMOGLOBIN GLYCOSYLATED A1C: CPT | Mod: GZ | Performed by: INTERNAL MEDICINE

## 2024-04-02 PROCEDURE — G0439 PPPS, SUBSEQ VISIT: HCPCS | Performed by: INTERNAL MEDICINE

## 2024-04-02 ASSESSMENT — PATIENT HEALTH QUESTIONNAIRE - PHQ9
SUM OF ALL RESPONSES TO PHQ QUESTIONS 1-9: 0
SUM OF ALL RESPONSES TO PHQ QUESTIONS 1-9: 0
10. IF YOU CHECKED OFF ANY PROBLEMS, HOW DIFFICULT HAVE THESE PROBLEMS MADE IT FOR YOU TO DO YOUR WORK, TAKE CARE OF THINGS AT HOME, OR GET ALONG WITH OTHER PEOPLE: NOT DIFFICULT AT ALL
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
## 2024-04-08 NOTE — PROGRESS NOTES
[facility] Care  Cardiac Electrophysiology  [address]  [place] [address]   Office: [phone]  Fax: [phone]     HEART CARE ELECTROPHYSIOLOGY FOLLOW UP    Primary Care: Olvin Sarkar MD      Assessment/Recommendations     Paroxysmal atrial fibrillation: well controlled on sotalol. We discussed the pathophysiology and natural progression of atrial fibrillation, management including stroke risk reduction, rate control, antiarrhythmic drug therapy and consideration of catheter ablation. She would prefer to continue current medical therapy but is interested in ablation in the future     [id number] score of 6-age >75, gender, HTN, VTE history-TIA; HAS BLED 1 for age >65. She denies bleeding complications    QTC WNL    TBI/SND status post PPM: Device interrogation reviewed, appropriate device function    HTN: Controlled on current regimen    Plan:   -Continue current medications  -Device and arrhythmia follow-up, EKG 1 year, sooner if needed      History of Present Illness/Subjective    Abiola Hallman is a 78 year old female with past medical history significant for paroxysmal AF, TBS/SND status post right-sided PPM 2020, HTN, dyslipidemia, TIA 2019, breast CA status post left mastectomy, scoliosis with prior surgery.  She was first documented in AF in 2020, subsequently developing significant symptomatic bradycardia on beta-blocker therapy and undergoing PPM implant.  She was started on sotalol post implant with good effect. Annual device interrogation today shows atrial arrhythmia burden <1% with longest and most recent episode in January up to 2.5 hours, generally with controlled ventricular response.    She notes historically atrial fibrillation is associated with significant stress. She has no particular awareness of arrhythmia. She denies chest discomfort, palpitations, shortness of breath, lightheadedness/dizziness, pedal edema, or syncope. She and her  daughter have been taking care of her  who suffered a fall and underwent hip surgery a few months ago. She exercises on recumbent bike.      Data Review     Arrhythmia hx:   Dx/date: Paroxysmal AF 2020  Sx: asx  [id number]: 6-age >75, gender, HTN, VTE history-TIA  Rate control: Metoprolol  AAD: Sotalol  DCCV: None  Ablation: None    EKG 4/9/2024 APVS 70 bpm, QT//414 ms   Personally reviewed.     TTE 9/12/2020    Left ventricle ejection fraction is normal. The estimated left ventricular ejection fraction is 55%.    Normal right ventricular size and systolic function.    No hemodynamically significant valvular heart abnormalities.    When compared to the previous study dated 9/16/2019, No changes noted    DEVICE: MDT dual PPM 9/14/2020 (SND/TBS)  Annual interrogation 4/9/2024  Pacing mode: AAIR-DDDR 70/130  Presenting rhythm: AP VS  Underlying rhythm: SB 30s  A-paced: 91%.  V-paced <1%.  Battery: estimated longevity 10 years  Atrial and Ventricular leads: Stable with good sensing, impedance, and pacing thresholds  Arrhythmias: Atrial arrhythmia burden <1% with generally controlled ventricular response.  Most recent episode 1/27/2024 up to 2.5 hours with controlled ventricular response. Single episode NSVT 170s 10/18/2023  Histograms: Primarily   Programming changes: None    Device interrogation 2/5/2024 shows paroxysmal AF longest 1.5 hours.  91% AP    I have reviewed and updated the patient's past medical history, allergy list and medication list.          Physical Examination   Vitals: /78 (BP Location: Right arm, Patient Position: Sitting, Cuff Size: Adult Regular)   Pulse 71   LMP  (LMP Unknown)   SpO2 96%     BMI= There is no height or weight on file to calculate BMI.    Wt Readings from Last 3 Encounters:   04/02/24 83.5 kg (184 lb)   05/03/23 81.2 kg (179 lb)   04/20/23 82.1 kg (181 lb)       General   Appearance:   Alert and oriented, in no acute distress.    HEENT:   Normocephalic and atraumatic. Conjunctiva and sclera are clear. Moist oral mucosa.    Neck: No JVP, carotid bruit or obvious thyromegaly.   Lungs:   Respirations unlabored. Clear bilaterally with no rales, rhonchi, or wheezes.     Cardiovascular:   Rhythm is regular. S1 and S2 are normal. No significant murmur is present. Lower extremities demonstrate trace edema. Posterior tibial pulses are intact bilaterally.   Extremities: No cyanosis or clubbing   Skin: Skin is warm, dry, and otherwise intact.   Neurologic: Gait not assessed. Mood and affect appropriate.           Medical History  Surgical History Family History Social History   Past Medical History:   Diagnosis Date    Breast cancer (H) 01/01/2007    Hypertension     Retinal detachment     Scoliosis     sx at age 53    Stroke (H)     Tremor     Past Surgical History:   Procedure Laterality Date    ABDOMEN SURGERY      BACK SURGERY      BIOPSY BREAST Right 01/01/2008 2011, 2012    BIOPSY BREAST Left 01/01/2007    CATARACT IOL, RT/LT Bilateral     EP PACEMAKER INSERT N/A 09/14/2020    Procedure: EP Pacemaker Insertion;  Surgeon: Cl Massey MD;  Location: [facility];  Service: Cardiology    LUMPECTOMY BREAST Left 01/01/2007    MASTECTOMY Left 01/01/2007    RETINAL REATTACHMENT Right     Family History   Problem Relation Age of Onset    Breast Cancer Mother 80.00    Social History     Socioeconomic History    Marital status:      Spouse name: Not on file    Number of children: Not on file    Years of education: Not on file    Highest education level: Not on file   Occupational History    Not on file   Tobacco Use    Smoking status: Never     Passive exposure: Never    Smokeless tobacco: Never   Substance and Sexual Activity    Alcohol use: Never    Drug use: Never    Sexual activity: Not on file   Other Topics Concern    Not on file   Social History Narrative    Not on file     Social Determinants of Health     Financial Resource Strain:  Low Risk  (3/28/2024)    Financial Resource Strain     Within the past 12 months, have you or your family members you live with been unable to get utilities (heat, electricity) when it was really needed?: No   Food Insecurity: Low Risk  (3/28/2024)    Food Insecurity     Within the past 12 months, did you worry that your food would run out before you got money to buy more?: No     Within the past 12 months, did the food you bought just not last and you didn t have money to get more?: No   Transportation Needs: Low Risk  (3/28/2024)    Transportation Needs     Within the past 12 months, has lack of transportation kept you from medical appointments, getting your medicines, non-medical meetings or appointments, work, or from getting things that you need?: No   Physical Activity: Not on file   Stress: Not on file   Social Connections: Unknown (3/28/2024)    Social Connection and Isolation Panel [NHANES]     Frequency of Communication with Friends and Family: Not on file     Frequency of Social Gatherings with Friends and Family: More than three times a week     Attends Mor[employer] Services: Not on file     Active Member of Clubs or Organizations: Not on file     Attends Club or Organization Meetings: Not on file     Marital Status: Not on file   Interpersonal Safety: Low Risk  (4/2/2024)    Interpersonal Safety     Do you feel physically and emotionally safe where you currently live?: Yes     Within the past 12 months, have you been hit, slapped, kicked or otherwise physically hurt by someone?: No     Within the past 12 months, have you been humiliated or emotionally abused in other ways by your partner or ex-partner?: No   Housing Stability: Low Risk  (3/28/2024)    Housing Stability     Do you have housing? : Yes     Are you worried about losing your housing?: No          Medications  Allergies   Scheduled Meds:  Current Outpatient Medications   Medication Sig Dispense Refill    acetaminophen (TYLENOL) 325 MG tablet  [ACETAMINOPHEN (TYLENOL) 325 MG TABLET] Take 1 tablet (325 mg total) by mouth every 4 (four) hours as needed.  0    apixaban ANTICOAGULANT (ELIQUIS ANTICOAGULANT) 5 MG tablet Take 1 tablet (5 mg) by mouth every 12 hours 180 tablet 3    atorvastatin (LIPITOR) 10 MG tablet TAKE 1 TABLET BY MOUTH AT BEDTIME 90 tablet 0    famotidine (PEPCID) 20 MG tablet [FAMOTIDINE (PEPCID) 20 MG TABLET] Take 1 tablet (20 mg total) by mouth daily. (for stomach acid)  0    lisinopril (ZESTRIL) 2.5 MG tablet Take 1 tablet (2.5 mg) by mouth 2 times daily 180 tablet 3    multivitamin Chew [MULTIVITAMIN CHEW] Chew 2 tablets daily.       propranolol ER (INDERAL LA) 60 MG 24 hr capsule Take 1 capsule (60 mg) by mouth daily 30 capsule 11    sertraline (ZOLOFT) 50 MG tablet Take 1 tablet by mouth once daily 90 tablet 3    sotalol (BETAPACE) 80 MG tablet Take 1 tablet (80 mg) by mouth every 12 hours 180 tablet 3    Allergies   Allergen Reactions    Essential Oils [External Allergen Needs Reconciliation - See Comment] Shortness Of Breath, Cough and Headache    Hydralazine Nausea and Vomiting and Headache     Possibly. 12 hrs of Nausea and headache after one 10 mg dose    Adhesive Tape-Silicones [Adhesive Tape] Unknown     Bandaids    Codeine Unknown    Darvon [Propoxyphene] Unknown    Latex Unknown    Monistat 1 (Tioconazole) [Tioconazole] Unknown    Neurontin [Gabapentin] Unknown    Oxycodone Unknown    Paxil [Paroxetine] Unknown    Vicodin [Hydrocodone-Acetaminophen] Unknown    Citalopram Anxiety    Paroxetine Anxiety         Lab Results    Chemistry/lipid CBC Cardiac Enzymes/BNP/TSH/INR   Lab Results   Component Value Date    CHOL 143 04/02/2024    HDL 52 04/02/2024    TRIG 134 04/02/2024    BUN 19.9 04/02/2024     04/02/2024    CO2 27 04/02/2024    Lab Results   Component Value Date    WBC 8.2 04/02/2024    HGB 14.0 04/02/2024    HCT 43.6 04/02/2024    MCV 95 04/02/2024     04/02/2024    @RESUFAST(BMP,CBC,BNP,TSH,  INR)@      30  minutes spent reviewing prior records (including documentation, laboratory studies, cardiac testing/imaging), history and physical exam, planning, and subsequent documentation.     The longitudinal plan of care for the diagnosis(es)/condition(s) as documented were addressed during this visit. Due to the added complexity in care, I will continue to support Abiola in the subsequent management and with ongoing continuity of care.     This note has been dictated using voice recognition software. Any grammatical, typographical, or context distortions are unintentional and inherent to the software.    Adeline Gibbs, CNP  Clinical Cardiac Electrophysiology  [facility]  Clinic and [phone]  Fax: [phone]  Electrophysiology Nurses: [phone]

## 2024-04-09 ENCOUNTER — ANCILLARY PROCEDURE (OUTPATIENT)
Dept: CARDIOLOGY | Facility: CLINIC | Age: 79
End: 2024-04-09
Attending: INTERNAL MEDICINE
Payer: COMMERCIAL

## 2024-04-09 ENCOUNTER — OFFICE VISIT (OUTPATIENT)
Dept: CARDIOLOGY | Facility: CLINIC | Age: 79
End: 2024-04-09
Payer: COMMERCIAL

## 2024-04-09 VITALS — DIASTOLIC BLOOD PRESSURE: 78 MMHG | SYSTOLIC BLOOD PRESSURE: 116 MMHG | OXYGEN SATURATION: 96 % | HEART RATE: 71 BPM

## 2024-04-09 DIAGNOSIS — I48.0 PAF (PAROXYSMAL ATRIAL FIBRILLATION) (H): Primary | ICD-10-CM

## 2024-04-09 DIAGNOSIS — Z95.0 CARDIAC PACEMAKER IN SITU: ICD-10-CM

## 2024-04-09 DIAGNOSIS — I49.5 SICK SINUS SYNDROME (H): ICD-10-CM

## 2024-04-09 DIAGNOSIS — R09.89 LABILE HYPERTENSION: ICD-10-CM

## 2024-04-09 DIAGNOSIS — I10 BENIGN ESSENTIAL HYPERTENSION: Chronic | ICD-10-CM

## 2024-04-09 LAB
ATRIAL RATE - MUSE: 70 BPM
DIASTOLIC BLOOD PRESSURE - MUSE: NORMAL MMHG
INTERPRETATION ECG - MUSE: NORMAL
MDC_IDC_LEAD_CONNECTION_STATUS: NORMAL
MDC_IDC_LEAD_CONNECTION_STATUS: NORMAL
MDC_IDC_LEAD_IMPLANT_DT: NORMAL
MDC_IDC_LEAD_IMPLANT_DT: NORMAL
MDC_IDC_LEAD_LOCATION: NORMAL
MDC_IDC_LEAD_LOCATION: NORMAL
MDC_IDC_LEAD_LOCATION_DETAIL_1: NORMAL
MDC_IDC_LEAD_LOCATION_DETAIL_1: NORMAL
MDC_IDC_LEAD_MFG: NORMAL
MDC_IDC_LEAD_MFG: NORMAL
MDC_IDC_LEAD_MODEL: NORMAL
MDC_IDC_LEAD_MODEL: NORMAL
MDC_IDC_LEAD_POLARITY_TYPE: NORMAL
MDC_IDC_LEAD_POLARITY_TYPE: NORMAL
MDC_IDC_LEAD_SERIAL: NORMAL
MDC_IDC_LEAD_SERIAL: NORMAL
MDC_IDC_LEAD_SPECIAL_FUNCTION: NORMAL
MDC_IDC_LEAD_SPECIAL_FUNCTION: NORMAL
MDC_IDC_MSMT_BATTERY_DTM: NORMAL
MDC_IDC_MSMT_BATTERY_REMAINING_LONGEVITY: 122 MO
MDC_IDC_MSMT_BATTERY_RRT_TRIGGER: 2.62
MDC_IDC_MSMT_BATTERY_STATUS: NORMAL
MDC_IDC_MSMT_BATTERY_VOLTAGE: 3.02 V
MDC_IDC_MSMT_LEADCHNL_RA_IMPEDANCE_VALUE: 361 OHM
MDC_IDC_MSMT_LEADCHNL_RA_IMPEDANCE_VALUE: 551 OHM
MDC_IDC_MSMT_LEADCHNL_RA_PACING_THRESHOLD_AMPLITUDE: 0.75 V
MDC_IDC_MSMT_LEADCHNL_RA_PACING_THRESHOLD_PULSEWIDTH: 0.4 MS
MDC_IDC_MSMT_LEADCHNL_RA_SENSING_INTR_AMPL: 2.6 MV
MDC_IDC_MSMT_LEADCHNL_RV_IMPEDANCE_VALUE: 418 OHM
MDC_IDC_MSMT_LEADCHNL_RV_IMPEDANCE_VALUE: 513 OHM
MDC_IDC_MSMT_LEADCHNL_RV_PACING_THRESHOLD_AMPLITUDE: 0.5 V
MDC_IDC_MSMT_LEADCHNL_RV_PACING_THRESHOLD_PULSEWIDTH: 0.4 MS
MDC_IDC_MSMT_LEADCHNL_RV_SENSING_INTR_AMPL: 4.6 MV
MDC_IDC_PG_IMPLANT_DTM: NORMAL
MDC_IDC_PG_MFG: NORMAL
MDC_IDC_PG_MODEL: NORMAL
MDC_IDC_PG_SERIAL: NORMAL
MDC_IDC_PG_TYPE: NORMAL
MDC_IDC_SESS_CLINIC_NAME: NORMAL
MDC_IDC_SESS_DTM: NORMAL
MDC_IDC_SESS_TYPE: NORMAL
MDC_IDC_SET_BRADY_AT_MODE_SWITCH_RATE: 171 {BEATS}/MIN
MDC_IDC_SET_BRADY_HYSTRATE: NORMAL
MDC_IDC_SET_BRADY_LOWRATE: 70 {BEATS}/MIN
MDC_IDC_SET_BRADY_MAX_SENSOR_RATE: 130 {BEATS}/MIN
MDC_IDC_SET_BRADY_MAX_TRACKING_RATE: 130 {BEATS}/MIN
MDC_IDC_SET_BRADY_MODE: NORMAL
MDC_IDC_SET_BRADY_PAV_DELAY_LOW: 180 MS
MDC_IDC_SET_BRADY_SAV_DELAY_LOW: 150 MS
MDC_IDC_SET_LEADCHNL_RA_PACING_AMPLITUDE: NORMAL
MDC_IDC_SET_LEADCHNL_RA_PACING_ANODE_ELECTRODE_1: NORMAL
MDC_IDC_SET_LEADCHNL_RA_PACING_ANODE_LOCATION_1: NORMAL
MDC_IDC_SET_LEADCHNL_RA_PACING_CAPTURE_MODE: NORMAL
MDC_IDC_SET_LEADCHNL_RA_PACING_CATHODE_ELECTRODE_1: NORMAL
MDC_IDC_SET_LEADCHNL_RA_PACING_CATHODE_LOCATION_1: NORMAL
MDC_IDC_SET_LEADCHNL_RA_PACING_POLARITY: NORMAL
MDC_IDC_SET_LEADCHNL_RA_PACING_PULSEWIDTH: 0.4 MS
MDC_IDC_SET_LEADCHNL_RA_SENSING_ANODE_ELECTRODE_1: NORMAL
MDC_IDC_SET_LEADCHNL_RA_SENSING_ANODE_LOCATION_1: NORMAL
MDC_IDC_SET_LEADCHNL_RA_SENSING_CATHODE_ELECTRODE_1: NORMAL
MDC_IDC_SET_LEADCHNL_RA_SENSING_CATHODE_LOCATION_1: NORMAL
MDC_IDC_SET_LEADCHNL_RA_SENSING_POLARITY: NORMAL
MDC_IDC_SET_LEADCHNL_RA_SENSING_SENSITIVITY: 0.45 MV
MDC_IDC_SET_LEADCHNL_RV_PACING_AMPLITUDE: NORMAL
MDC_IDC_SET_LEADCHNL_RV_PACING_ANODE_ELECTRODE_1: NORMAL
MDC_IDC_SET_LEADCHNL_RV_PACING_ANODE_LOCATION_1: NORMAL
MDC_IDC_SET_LEADCHNL_RV_PACING_CAPTURE_MODE: NORMAL
MDC_IDC_SET_LEADCHNL_RV_PACING_CATHODE_ELECTRODE_1: NORMAL
MDC_IDC_SET_LEADCHNL_RV_PACING_CATHODE_LOCATION_1: NORMAL
MDC_IDC_SET_LEADCHNL_RV_PACING_POLARITY: NORMAL
MDC_IDC_SET_LEADCHNL_RV_PACING_PULSEWIDTH: 0.4 MS
MDC_IDC_SET_LEADCHNL_RV_SENSING_ANODE_ELECTRODE_1: NORMAL
MDC_IDC_SET_LEADCHNL_RV_SENSING_ANODE_LOCATION_1: NORMAL
MDC_IDC_SET_LEADCHNL_RV_SENSING_CATHODE_ELECTRODE_1: NORMAL
MDC_IDC_SET_LEADCHNL_RV_SENSING_CATHODE_LOCATION_1: NORMAL
MDC_IDC_SET_LEADCHNL_RV_SENSING_POLARITY: NORMAL
MDC_IDC_SET_LEADCHNL_RV_SENSING_SENSITIVITY: 0.9 MV
MDC_IDC_SET_ZONE_DETECTION_INTERVAL: 200 MS
MDC_IDC_SET_ZONE_DETECTION_INTERVAL: 350 MS
MDC_IDC_SET_ZONE_DETECTION_INTERVAL: 400 MS
MDC_IDC_SET_ZONE_STATUS: NORMAL
MDC_IDC_SET_ZONE_TYPE: NORMAL
MDC_IDC_SET_ZONE_VENDOR_TYPE: NORMAL
MDC_IDC_STAT_AT_BURDEN_PERCENT: 0 %
MDC_IDC_STAT_AT_DTM_END: NORMAL
MDC_IDC_STAT_AT_DTM_START: NORMAL
MDC_IDC_STAT_AT_MODE_SW_COUNT: 4
MDC_IDC_STAT_BRADY_AP_VP_PERCENT: 0.03 %
MDC_IDC_STAT_BRADY_AP_VS_PERCENT: 91.58 %
MDC_IDC_STAT_BRADY_AS_VP_PERCENT: 0.01 %
MDC_IDC_STAT_BRADY_AS_VS_PERCENT: 8.38 %
MDC_IDC_STAT_BRADY_DTM_END: NORMAL
MDC_IDC_STAT_BRADY_DTM_START: NORMAL
MDC_IDC_STAT_BRADY_RA_PERCENT_PACED: 90.69 %
MDC_IDC_STAT_BRADY_RV_PERCENT_PACED: 0.05 %
MDC_IDC_STAT_EPISODE_RECENT_COUNT: 0
MDC_IDC_STAT_EPISODE_RECENT_COUNT: 1
MDC_IDC_STAT_EPISODE_RECENT_COUNT: 4
MDC_IDC_STAT_EPISODE_RECENT_COUNT_DTM_END: NORMAL
MDC_IDC_STAT_EPISODE_RECENT_COUNT_DTM_START: NORMAL
MDC_IDC_STAT_EPISODE_TOTAL_COUNT: 0
MDC_IDC_STAT_EPISODE_TOTAL_COUNT: 2
MDC_IDC_STAT_EPISODE_TOTAL_COUNT: 6
MDC_IDC_STAT_EPISODE_TOTAL_COUNT_DTM_END: NORMAL
MDC_IDC_STAT_EPISODE_TOTAL_COUNT_DTM_START: NORMAL
MDC_IDC_STAT_EPISODE_TYPE: NORMAL
MDC_IDC_STAT_TACHYTHERAPY_RECENT_DTM_END: NORMAL
MDC_IDC_STAT_TACHYTHERAPY_RECENT_DTM_START: NORMAL
MDC_IDC_STAT_TACHYTHERAPY_TOTAL_DTM_END: NORMAL
MDC_IDC_STAT_TACHYTHERAPY_TOTAL_DTM_START: NORMAL
P AXIS - MUSE: NORMAL DEGREES
PR INTERVAL - MUSE: NORMAL MS
QRS DURATION - MUSE: 82 MS
QT - MUSE: 384 MS
QTC - MUSE: 414 MS
R AXIS - MUSE: 44 DEGREES
SYSTOLIC BLOOD PRESSURE - MUSE: NORMAL MMHG
T AXIS - MUSE: 51 DEGREES
VENTRICULAR RATE- MUSE: 70 BPM

## 2024-04-09 PROCEDURE — G2211 COMPLEX E/M VISIT ADD ON: HCPCS | Performed by: NURSE PRACTITIONER

## 2024-04-09 PROCEDURE — 93000 ELECTROCARDIOGRAM COMPLETE: CPT | Performed by: INTERNAL MEDICINE

## 2024-04-09 PROCEDURE — 99214 OFFICE O/P EST MOD 30 MIN: CPT | Performed by: NURSE PRACTITIONER

## 2024-04-09 PROCEDURE — 93280 PM DEVICE PROGR EVAL DUAL: CPT | Performed by: INTERNAL MEDICINE

## 2024-04-09 RX ORDER — LISINOPRIL 2.5 MG/1
2.5 TABLET ORAL 2 TIMES DAILY
Qty: 180 TABLET | Refills: 3 | Status: SHIPPED | OUTPATIENT
Start: 2024-04-09

## 2024-04-09 RX ORDER — SOTALOL HYDROCHLORIDE 80 MG/1
80 TABLET ORAL EVERY 12 HOURS
Qty: 180 TABLET | Refills: 3 | Status: SHIPPED | OUTPATIENT
Start: 2024-04-09

## 2024-04-09 NOTE — LETTER
4/9/2024    OLVIN LEWIS MD  [address] [place] [address]    RE: Abiola Hallman       Dear Colleague,     I had the pleasure of seeing Abiola Hallman in the [facility].                     [facility] Care  Cardiac Electrophysiology  [address]  [place] [address]   Office: [phone]  Fax: [phone]     HEART CARE ELECTROPHYSIOLOGY FOLLOW UP    Primary Care: Olvin Lewis MD      Assessment/Recommendations     Paroxysmal atrial fibrillation: well controlled on sotalol. We discussed the pathophysiology and natural progression of atrial fibrillation, management including stroke risk reduction, rate control, antiarrhythmic drug therapy and consideration of catheter ablation. She would prefer to continue current medical therapy but is interested in ablation in the future     [id number] score of 6-age >75, gender, HTN, VTE history-TIA; HAS BLED 1 for age >65. She denies bleeding complications    QTC WNL    TBI/SND status post PPM: Device interrogation reviewed, appropriate device function    HTN: Controlled on current regimen    Plan:   -Continue current medications  -Device and arrhythmia follow-up, EKG 1 year, sooner if needed      History of Present Illness/Subjective    Abiola Hallman is a 78 year old female with past medical history significant for paroxysmal AF, TBS/SND status post right-sided PPM 2020, HTN, dyslipidemia, TIA 2019, breast CA status post left mastectomy, scoliosis with prior surgery.  She was first documented in AF in 2020, subsequently developing significant symptomatic bradycardia on beta-blocker therapy and undergoing PPM implant.  She was started on sotalol post implant with good effect. Annual device interrogation today shows atrial arrhythmia burden <1% with longest and most recent episode in January up to 2.5 hours, generally with controlled ventricular response.    She notes historically atrial fibrillation is  associated with significant stress. She has no particular awareness of arrhythmia. She denies chest discomfort, palpitations, shortness of breath, lightheadedness/dizziness, pedal edema, or syncope. She and her daughter have been taking care of her  who suffered a fall and underwent hip surgery a few months ago.      Data Review     Arrhythmia hx:   Dx/date: Paroxysmal AF 2020  Sx: asx  [id number]: 6-age >75, gender, HTN, VTE history-TIA  Rate control: Metoprolol  AAD: Sotalol  DCCV: None  Ablation: None    EKG 4/9/2024 APVS 70 bpm, QT//414 ms   Personally reviewed.     TTE 9/12/2020    Left ventricle ejection fraction is normal. The estimated left ventricular ejection fraction is 55%.    Normal right ventricular size and systolic function.    No hemodynamically significant valvular heart abnormalities.    When compared to the previous study dated 9/16/2019, No changes noted    DEVICE: MDT dual PPM 9/14/2020 (SND/TBS)  Annual interrogation 4/9/2024  Pacing mode: AAIR-DDDR 70/130  Presenting rhythm: AP VS  Underlying rhythm: SB 30s  A-paced: 91%.  V-paced <1%.  Battery: estimated longevity 10 years  Atrial and Ventricular leads: Stable with good sensing, impedance, and pacing thresholds  Arrhythmias: Atrial arrhythmia burden <1% with generally controlled ventricular response.  Most recent episode 1/27/2024 up to 2.5 hours with controlled ventricular response. Single episode NSVT 170s 10/18/2023  Histograms: Primarily   Programming changes: None    Device interrogation 2/5/2024 shows paroxysmal AF longest 1.5 hours.  91% AP    I have reviewed and updated the patient's past medical history, allergy list and medication list.          Physical Examination   Vitals: /78 (BP Location: Right arm, Patient Position: Sitting, Cuff Size: Adult Regular)   Pulse 71   LMP  (LMP Unknown)   SpO2 96%     BMI= There is no height or weight on file to calculate BMI.    Wt Readings from Last 3  Encounters:   04/02/24 83.5 kg (184 lb)   05/03/23 81.2 kg (179 lb)   04/20/23 82.1 kg (181 lb)       General   Appearance:   Alert and oriented, in no acute distress.    HEENT:  Normocephalic and atraumatic. Conjunctiva and sclera are clear. Moist oral mucosa.    Neck: No JVP, carotid bruit or obvious thyromegaly.   Lungs:   Respirations unlabored. Clear bilaterally with no rales, rhonchi, or wheezes.     Cardiovascular:   Rhythm is regular. S1 and S2 are normal. No significant murmur is present. Lower extremities demonstrate trace edema. Posterior tibial pulses are intact bilaterally.   Extremities: No cyanosis or clubbing   Skin: Skin is warm, dry, and otherwise intact.   Neurologic: Gait not assessed. Mood and affect appropriate.           Medical History  Surgical History Family History Social History   Past Medical History:   Diagnosis Date    Breast cancer (H) 01/01/2007    Hypertension     Retinal detachment     Scoliosis     sx at age 53    Stroke (H)     Tremor     Past Surgical History:   Procedure Laterality Date    ABDOMEN SURGERY      BACK SURGERY      BIOPSY BREAST Right 01/01/2008 2011, 2012    BIOPSY BREAST Left 01/01/2007    CATARACT IOL, RT/LT Bilateral     EP PACEMAKER INSERT N/A 09/14/2020    Procedure: EP Pacemaker Insertion;  Surgeon: Cl Massey MD;  Location: [facility];  Service: Cardiology    LUMPECTOMY BREAST Left 01/01/2007    MASTECTOMY Left 01/01/2007    RETINAL REATTACHMENT Right     Family History   Problem Relation Age of Onset    Breast Cancer Mother 80.00    Social History     Socioeconomic History    Marital status:      Spouse name: Not on file    Number of children: Not on file    Years of education: Not on file    Highest education level: Not on file   Occupational History    Not on file   Tobacco Use    Smoking status: Never     Passive exposure: Never    Smokeless tobacco: Never   Substance and Sexual Activity    Alcohol use: Never    Drug use: Never     Sexual activity: Not on file   Other Topics Concern    Not on file   Social History Narrative    Not on file     Social Determinants of Health     Financial Resource Strain: Low Risk  (3/28/2024)    Financial Resource Strain     Within the past 12 months, have you or your family members you live with been unable to get utilities (heat, electricity) when it was really needed?: No   Food Insecurity: Low Risk  (3/28/2024)    Food Insecurity     Within the past 12 months, did you worry that your food would run out before you got money to buy more?: No     Within the past 12 months, did the food you bought just not last and you didn t have money to get more?: No   Transportation Needs: Low Risk  (3/28/2024)    Transportation Needs     Within the past 12 months, has lack of transportation kept you from medical appointments, getting your medicines, non-medical meetings or appointments, work, or from getting things that you need?: No   Physical Activity: Not on file   Stress: Not on file   Social Connections: Unknown (3/28/2024)    Social Connection and Isolation Panel [NHANES]     Frequency of Communication with Friends and Family: Not on file     Frequency of Social Gatherings with Friends and Family: More than three times a week     Attends Sabianist Services: Not on file     Active Member of Clubs or Organizations: Not on file     Attends Club or Organization Meetings: Not on file     Marital Status: Not on file   Interpersonal Safety: Low Risk  (4/2/2024)    Interpersonal Safety     Do you feel physically and emotionally safe where you currently live?: Yes     Within the past 12 months, have you been hit, slapped, kicked or otherwise physically hurt by someone?: No     Within the past 12 months, have you been humiliated or emotionally abused in other ways by your partner or ex-partner?: No   Housing Stability: Low Risk  (3/28/2024)    Housing Stability     Do you have housing? : Yes     Are you worried about losing  your housing?: No          Medications  Allergies   Scheduled Meds:  Current Outpatient Medications   Medication Sig Dispense Refill    acetaminophen (TYLENOL) 325 MG tablet [ACETAMINOPHEN (TYLENOL) 325 MG TABLET] Take 1 tablet (325 mg total) by mouth every 4 (four) hours as needed.  0    apixaban ANTICOAGULANT (ELIQUIS ANTICOAGULANT) 5 MG tablet Take 1 tablet (5 mg) by mouth every 12 hours 180 tablet 3    atorvastatin (LIPITOR) 10 MG tablet TAKE 1 TABLET BY MOUTH AT BEDTIME 90 tablet 0    famotidine (PEPCID) 20 MG tablet [FAMOTIDINE (PEPCID) 20 MG TABLET] Take 1 tablet (20 mg total) by mouth daily. (for stomach acid)  0    lisinopril (ZESTRIL) 2.5 MG tablet Take 1 tablet (2.5 mg) by mouth 2 times daily 180 tablet 3    multivitamin Chew [MULTIVITAMIN CHEW] Chew 2 tablets daily.       propranolol ER (INDERAL LA) 60 MG 24 hr capsule Take 1 capsule (60 mg) by mouth daily 30 capsule 11    sertraline (ZOLOFT) 50 MG tablet Take 1 tablet by mouth once daily 90 tablet 3    sotalol (BETAPACE) 80 MG tablet Take 1 tablet (80 mg) by mouth every 12 hours 180 tablet 3    Allergies   Allergen Reactions    Essential Oils [External Allergen Needs Reconciliation - See Comment] Shortness Of Breath, Cough and Headache    Hydralazine Nausea and Vomiting and Headache     Possibly. 12 hrs of Nausea and headache after one 10 mg dose    Adhesive Tape-Silicones [Adhesive Tape] Unknown     Bandaids    Codeine Unknown    Darvon [Propoxyphene] Unknown    Latex Unknown    Monistat 1 (Tioconazole) [Tioconazole] Unknown    Neurontin [Gabapentin] Unknown    Oxycodone Unknown    Paxil [Paroxetine] Unknown    Vicodin [Hydrocodone-Acetaminophen] Unknown    Citalopram Anxiety    Paroxetine Anxiety         Lab Results    Chemistry/lipid CBC Cardiac Enzymes/BNP/TSH/INR   Lab Results   Component Value Date    CHOL 143 04/02/2024    HDL 52 04/02/2024    TRIG 134 04/02/2024    BUN 19.9 04/02/2024     04/02/2024    CO2 27 04/02/2024    Lab Results    Component Value Date    WBC 8.2 2024    HGB 14.0 2024    HCT 43.6 2024    MCV 95 2024     2024    @RESUFAST(BMP,CBC,BNP,TSH,  INR)@      30 minutes spent reviewing prior records (including documentation, laboratory studies, cardiac testing/imaging), history and physical exam, planning, and subsequent documentation.     The longitudinal plan of care for the diagnosis(es)/condition(s) as documented were addressed during this visit. Due to the added complexity in care, I will continue to support Abiola in the subsequent management and with ongoing continuity of care.     This note has been dictated using voice recognition software. Any grammatical, typographical, or context distortions are unintentional and inherent to the software.    Adeline Gibbs CNP  Clinical Cardiac Electrophysiology  [facility]  Clinic and [phone]  Fax: [phone]  Electrophysiology Nurses: [phone]          Thank you for allowing me to participate in the care of your patient.      Sincerely,     RUDOLPH SHEIKH CNP     [facility]  cc:   RUDOLPH Menezes CNP  [address] [place] [address]

## 2024-04-09 NOTE — PATIENT INSTRUCTIONS
Abiola Hallman,    It was a pleasure to see you today at the [facility].     My recommendations after this visit include:  -Continue current medications  -Follow-up 1 year    Please do not hesitate to call with additional questions or concerns.     Adeline Gibbs, CNP  Clinical Cardiac Electrophysiology  [facility]  Clinic and [phone]  Fax: [phone]  Electrophysiology Nurses: [phone]

## 2024-04-16 DIAGNOSIS — L30.9 PERIOCULAR DERMATITIS: Primary | ICD-10-CM

## 2024-04-16 RX ORDER — NEOMYCIN SULFATE, POLYMYXIN B SULFATE AND DEXAMETHASONE 3.5; 10000; 1 MG/ML; [USP'U]/ML; MG/ML
1-2 SUSPENSION/ DROPS OPHTHALMIC DAILY PRN
Qty: 1 ML | Refills: 0 | Status: SHIPPED | OUTPATIENT
Start: 2024-04-16

## 2024-04-16 NOTE — TELEPHONE ENCOUNTER
Patient requesting a refill of Maxitrol ointment.  Will send one tube of Maxitrol for her, she is due in July of 2024 for a complete eye exam.

## 2024-04-30 DIAGNOSIS — E78.5 HYPERLIPIDEMIA LDL GOAL <70: ICD-10-CM

## 2024-05-01 RX ORDER — ATORVASTATIN CALCIUM 10 MG/1
10 TABLET, FILM COATED ORAL AT BEDTIME
Qty: 90 TABLET | Refills: 2 | Status: SHIPPED | OUTPATIENT
Start: 2024-05-01

## 2024-05-30 ENCOUNTER — PATIENT OUTREACH (OUTPATIENT)
Dept: CARE COORDINATION | Facility: CLINIC | Age: 79
End: 2024-05-30
Payer: COMMERCIAL

## 2024-06-18 DIAGNOSIS — G25.0 ESSENTIAL TREMOR: ICD-10-CM

## 2024-06-18 RX ORDER — PROPRANOLOL HCL 60 MG
60 CAPSULE, EXTENDED RELEASE 24HR ORAL DAILY
Qty: 90 CAPSULE | Refills: 2 | Status: SHIPPED | OUTPATIENT
Start: 2024-06-18 | End: 2024-09-10 | Stop reason: ALTCHOICE

## 2024-07-11 ENCOUNTER — ANCILLARY PROCEDURE (OUTPATIENT)
Dept: CARDIOLOGY | Facility: CLINIC | Age: 79
End: 2024-07-11
Attending: INTERNAL MEDICINE
Payer: COMMERCIAL

## 2024-07-11 DIAGNOSIS — I49.5 SICK SINUS SYNDROME (H): ICD-10-CM

## 2024-07-11 DIAGNOSIS — Z95.0 CARDIAC PACEMAKER IN SITU: ICD-10-CM

## 2024-07-11 LAB
MDC_IDC_LEAD_CONNECTION_STATUS: NORMAL
MDC_IDC_LEAD_CONNECTION_STATUS: NORMAL
MDC_IDC_LEAD_IMPLANT_DT: NORMAL
MDC_IDC_LEAD_IMPLANT_DT: NORMAL
MDC_IDC_LEAD_LOCATION: NORMAL
MDC_IDC_LEAD_LOCATION: NORMAL
MDC_IDC_LEAD_LOCATION_DETAIL_1: NORMAL
MDC_IDC_LEAD_LOCATION_DETAIL_1: NORMAL
MDC_IDC_LEAD_MFG: NORMAL
MDC_IDC_LEAD_MFG: NORMAL
MDC_IDC_LEAD_MODEL: NORMAL
MDC_IDC_LEAD_MODEL: NORMAL
MDC_IDC_LEAD_POLARITY_TYPE: NORMAL
MDC_IDC_LEAD_POLARITY_TYPE: NORMAL
MDC_IDC_LEAD_SERIAL: NORMAL
MDC_IDC_LEAD_SERIAL: NORMAL
MDC_IDC_LEAD_SPECIAL_FUNCTION: NORMAL
MDC_IDC_LEAD_SPECIAL_FUNCTION: NORMAL
MDC_IDC_MSMT_BATTERY_DTM: NORMAL
MDC_IDC_MSMT_BATTERY_REMAINING_LONGEVITY: 118 MO
MDC_IDC_MSMT_BATTERY_RRT_TRIGGER: 2.62
MDC_IDC_MSMT_BATTERY_STATUS: NORMAL
MDC_IDC_MSMT_BATTERY_VOLTAGE: 3.01 V
MDC_IDC_MSMT_LEADCHNL_RA_IMPEDANCE_VALUE: 323 OHM
MDC_IDC_MSMT_LEADCHNL_RA_IMPEDANCE_VALUE: 513 OHM
MDC_IDC_MSMT_LEADCHNL_RA_PACING_THRESHOLD_AMPLITUDE: 0.75 V
MDC_IDC_MSMT_LEADCHNL_RA_PACING_THRESHOLD_PULSEWIDTH: 0.4 MS
MDC_IDC_MSMT_LEADCHNL_RA_SENSING_INTR_AMPL: 2.5 MV
MDC_IDC_MSMT_LEADCHNL_RA_SENSING_INTR_AMPL: 2.5 MV
MDC_IDC_MSMT_LEADCHNL_RV_IMPEDANCE_VALUE: 361 OHM
MDC_IDC_MSMT_LEADCHNL_RV_IMPEDANCE_VALUE: 456 OHM
MDC_IDC_MSMT_LEADCHNL_RV_PACING_THRESHOLD_AMPLITUDE: 0.62 V
MDC_IDC_MSMT_LEADCHNL_RV_PACING_THRESHOLD_PULSEWIDTH: 0.4 MS
MDC_IDC_MSMT_LEADCHNL_RV_SENSING_INTR_AMPL: 4.38 MV
MDC_IDC_MSMT_LEADCHNL_RV_SENSING_INTR_AMPL: 4.38 MV
MDC_IDC_PG_IMPLANT_DTM: NORMAL
MDC_IDC_PG_MFG: NORMAL
MDC_IDC_PG_MODEL: NORMAL
MDC_IDC_PG_SERIAL: NORMAL
MDC_IDC_PG_TYPE: NORMAL
MDC_IDC_SESS_CLINIC_NAME: NORMAL
MDC_IDC_SESS_DTM: NORMAL
MDC_IDC_SESS_TYPE: NORMAL
MDC_IDC_SET_BRADY_AT_MODE_SWITCH_RATE: 171 {BEATS}/MIN
MDC_IDC_SET_BRADY_HYSTRATE: NORMAL
MDC_IDC_SET_BRADY_LOWRATE: 70 {BEATS}/MIN
MDC_IDC_SET_BRADY_MAX_SENSOR_RATE: 130 {BEATS}/MIN
MDC_IDC_SET_BRADY_MAX_TRACKING_RATE: 130 {BEATS}/MIN
MDC_IDC_SET_BRADY_MODE: NORMAL
MDC_IDC_SET_BRADY_PAV_DELAY_LOW: 180 MS
MDC_IDC_SET_BRADY_SAV_DELAY_LOW: 150 MS
MDC_IDC_SET_LEADCHNL_RA_PACING_AMPLITUDE: 1.5 V
MDC_IDC_SET_LEADCHNL_RA_PACING_ANODE_ELECTRODE_1: NORMAL
MDC_IDC_SET_LEADCHNL_RA_PACING_ANODE_LOCATION_1: NORMAL
MDC_IDC_SET_LEADCHNL_RA_PACING_CAPTURE_MODE: NORMAL
MDC_IDC_SET_LEADCHNL_RA_PACING_CATHODE_ELECTRODE_1: NORMAL
MDC_IDC_SET_LEADCHNL_RA_PACING_CATHODE_LOCATION_1: NORMAL
MDC_IDC_SET_LEADCHNL_RA_PACING_POLARITY: NORMAL
MDC_IDC_SET_LEADCHNL_RA_PACING_PULSEWIDTH: 0.4 MS
MDC_IDC_SET_LEADCHNL_RA_SENSING_ANODE_ELECTRODE_1: NORMAL
MDC_IDC_SET_LEADCHNL_RA_SENSING_ANODE_LOCATION_1: NORMAL
MDC_IDC_SET_LEADCHNL_RA_SENSING_CATHODE_ELECTRODE_1: NORMAL
MDC_IDC_SET_LEADCHNL_RA_SENSING_CATHODE_LOCATION_1: NORMAL
MDC_IDC_SET_LEADCHNL_RA_SENSING_POLARITY: NORMAL
MDC_IDC_SET_LEADCHNL_RA_SENSING_SENSITIVITY: 0.45 MV
MDC_IDC_SET_LEADCHNL_RV_PACING_AMPLITUDE: 1.5 V
MDC_IDC_SET_LEADCHNL_RV_PACING_ANODE_ELECTRODE_1: NORMAL
MDC_IDC_SET_LEADCHNL_RV_PACING_ANODE_LOCATION_1: NORMAL
MDC_IDC_SET_LEADCHNL_RV_PACING_CAPTURE_MODE: NORMAL
MDC_IDC_SET_LEADCHNL_RV_PACING_CATHODE_ELECTRODE_1: NORMAL
MDC_IDC_SET_LEADCHNL_RV_PACING_CATHODE_LOCATION_1: NORMAL
MDC_IDC_SET_LEADCHNL_RV_PACING_POLARITY: NORMAL
MDC_IDC_SET_LEADCHNL_RV_PACING_PULSEWIDTH: 0.4 MS
MDC_IDC_SET_LEADCHNL_RV_SENSING_ANODE_ELECTRODE_1: NORMAL
MDC_IDC_SET_LEADCHNL_RV_SENSING_ANODE_LOCATION_1: NORMAL
MDC_IDC_SET_LEADCHNL_RV_SENSING_CATHODE_ELECTRODE_1: NORMAL
MDC_IDC_SET_LEADCHNL_RV_SENSING_CATHODE_LOCATION_1: NORMAL
MDC_IDC_SET_LEADCHNL_RV_SENSING_POLARITY: NORMAL
MDC_IDC_SET_LEADCHNL_RV_SENSING_SENSITIVITY: 0.9 MV
MDC_IDC_SET_ZONE_DETECTION_INTERVAL: 200 MS
MDC_IDC_SET_ZONE_DETECTION_INTERVAL: 350 MS
MDC_IDC_SET_ZONE_DETECTION_INTERVAL: 400 MS
MDC_IDC_SET_ZONE_STATUS: NORMAL
MDC_IDC_SET_ZONE_TYPE: NORMAL
MDC_IDC_SET_ZONE_VENDOR_TYPE: NORMAL
MDC_IDC_STAT_AT_BURDEN_PERCENT: 0 %
MDC_IDC_STAT_AT_DTM_END: NORMAL
MDC_IDC_STAT_AT_DTM_START: NORMAL
MDC_IDC_STAT_BRADY_AP_VP_PERCENT: 0.03 %
MDC_IDC_STAT_BRADY_AP_VS_PERCENT: 89.46 %
MDC_IDC_STAT_BRADY_AS_VP_PERCENT: 0.01 %
MDC_IDC_STAT_BRADY_AS_VS_PERCENT: 10.5 %
MDC_IDC_STAT_BRADY_DTM_END: NORMAL
MDC_IDC_STAT_BRADY_DTM_START: NORMAL
MDC_IDC_STAT_BRADY_RA_PERCENT_PACED: 88.35 %
MDC_IDC_STAT_BRADY_RV_PERCENT_PACED: 0.04 %
MDC_IDC_STAT_EPISODE_RECENT_COUNT: 0
MDC_IDC_STAT_EPISODE_RECENT_COUNT_DTM_END: NORMAL
MDC_IDC_STAT_EPISODE_RECENT_COUNT_DTM_START: NORMAL
MDC_IDC_STAT_EPISODE_TOTAL_COUNT: 0
MDC_IDC_STAT_EPISODE_TOTAL_COUNT: 2
MDC_IDC_STAT_EPISODE_TOTAL_COUNT: 6
MDC_IDC_STAT_EPISODE_TOTAL_COUNT_DTM_END: NORMAL
MDC_IDC_STAT_EPISODE_TOTAL_COUNT_DTM_START: NORMAL
MDC_IDC_STAT_EPISODE_TYPE: NORMAL
MDC_IDC_STAT_TACHYTHERAPY_RECENT_DTM_END: NORMAL
MDC_IDC_STAT_TACHYTHERAPY_RECENT_DTM_START: NORMAL
MDC_IDC_STAT_TACHYTHERAPY_TOTAL_DTM_END: NORMAL
MDC_IDC_STAT_TACHYTHERAPY_TOTAL_DTM_START: NORMAL

## 2024-07-11 PROCEDURE — 93294 REM INTERROG EVL PM/LDLS PM: CPT | Performed by: INTERNAL MEDICINE

## 2024-07-11 PROCEDURE — 93296 REM INTERROG EVL PM/IDS: CPT | Performed by: INTERNAL MEDICINE

## 2024-07-18 ENCOUNTER — ANCILLARY PROCEDURE (OUTPATIENT)
Dept: MAMMOGRAPHY | Facility: CLINIC | Age: 79
End: 2024-07-18
Attending: INTERNAL MEDICINE
Payer: COMMERCIAL

## 2024-07-18 DIAGNOSIS — Z12.31 SCREENING MAMMOGRAM, ENCOUNTER FOR: ICD-10-CM

## 2024-07-18 PROCEDURE — 77063 BREAST TOMOSYNTHESIS BI: CPT | Mod: 52

## 2024-08-01 ENCOUNTER — TRANSFERRED RECORDS (OUTPATIENT)
Dept: HEALTH INFORMATION MANAGEMENT | Facility: CLINIC | Age: 79
End: 2024-08-01
Payer: COMMERCIAL

## 2024-09-09 ENCOUNTER — OFFICE VISIT (OUTPATIENT)
Dept: NEUROLOGY | Facility: CLINIC | Age: 79
End: 2024-09-09
Payer: COMMERCIAL

## 2024-09-09 VITALS — OXYGEN SATURATION: 99 % | DIASTOLIC BLOOD PRESSURE: 89 MMHG | SYSTOLIC BLOOD PRESSURE: 143 MMHG | HEART RATE: 71 BPM

## 2024-09-09 DIAGNOSIS — G25.0 BENIGN ESSENTIAL TREMOR: Primary | ICD-10-CM

## 2024-09-09 PROCEDURE — G2211 COMPLEX E/M VISIT ADD ON: HCPCS | Performed by: PSYCHIATRY & NEUROLOGY

## 2024-09-09 PROCEDURE — 99214 OFFICE O/P EST MOD 30 MIN: CPT | Performed by: PSYCHIATRY & NEUROLOGY

## 2024-09-09 NOTE — PROGRESS NOTES
Department of Neurology  Movement Disorders Division   Return Patient Visit    Patient: Abiola Hallman   MRN: [id number]   : 1945   Date of Visit: 24   PCP: RICKEY LEWIS MD   Referring provider: No ref. provider found    CC:  Return for essential tremor    Interval history:  Ms. Hallman is a 78 year old right-handed female with history significant for multiple medical issues including essential tremor who presents to movement disorder clinic for regular follow-up. Last visit was 2024, at which time no changes were made to her propranolol dose.     Ms. Hallman presents by herself.     She reports that she has an issue once in a while where her little finger on her right hand will lock. Sometimes she is able to write beautifully but sometimes the pinky finger locks up and it seems to drive the tremor. This is not painful. She wonders if her finger locking up is due to prior surgeries she had with the IV being placed in her right hand.     She reports that she and her brother have a little bit of tremor from having had a bad childhood. Her hand was always a little shaky when she was young. She has been prescribed propranolol since her 30s which helped her tremor for a long time. She reports that her tremor really kicked up after having multiple surgeries with IV placed in her hand. Her tremor has been worse since her last eye surgery in .     Since she was last seen, her tremor has stayed about the same. Once in a great while tremor will interfere with writing Jasper cards. For the most part, her tremor doesn't interfere with her function.    Her  continues to have infrequent hip pain from his prior fracture but he is doing much better since he broke it in a fall in December. They have been able to go back to dancing together. She also uses a recumbent bike in her home which she has found very helpful for her knees.     No dizziness or lightheadedness. She had orthostatic symptoms when  she was on too much lisinopril around the time that she was getting her pacemaker placed (2020).    She is still taking Eliquis - no problems.     She denies feeling down, depressed or anxious. She feels much better now because she had some dental work done recently.     ROS:  All others negative except as listed above. Pertinent movement disorders-specific ROS listed above.    Past Medical History:   Diagnosis Date    Breast cancer (H) 01/01/2007    Hypertension     Retinal detachment     Scoliosis     sx at age 53    Stroke (H)     Tremor         Past Surgical History:   Procedure Laterality Date    ABDOMEN SURGERY      BACK SURGERY      BIOPSY BREAST Right 01/01/2008 2011, 2012    BIOPSY BREAST Left 01/01/2007    CATARACT IOL, RT/LT Bilateral     EP PACEMAKER INSERT N/A 09/14/2020    Procedure: EP Pacemaker Insertion;  Surgeon: Cl Massey MD;  Location: [facility];  Service: Cardiology    LUMPECTOMY BREAST Left 01/01/2007    MASTECTOMY Left 01/01/2007    RETINAL REATTACHMENT Right           Current Outpatient Medications:     acetaminophen (TYLENOL) 325 MG tablet, [ACETAMINOPHEN (TYLENOL) 325 MG TABLET] Take 1 tablet (325 mg total) by mouth every 4 (four) hours as needed., Disp: , Rfl: 0    apixaban ANTICOAGULANT (ELIQUIS ANTICOAGULANT) 5 MG tablet, Take 1 tablet (5 mg) by mouth every 12 hours, Disp: 180 tablet, Rfl: 3    atorvastatin (LIPITOR) 10 MG tablet, TAKE 1 TABLET BY MOUTH AT BEDTIME, Disp: 90 tablet, Rfl: 2    famotidine (PEPCID) 20 MG tablet, [FAMOTIDINE (PEPCID) 20 MG TABLET] Take 1 tablet (20 mg total) by mouth daily. (for stomach acid), Disp: , Rfl: 0    lisinopril (ZESTRIL) 2.5 MG tablet, Take 1 tablet (2.5 mg) by mouth 2 times daily, Disp: 180 tablet, Rfl: 3    multivitamin Chew, [MULTIVITAMIN CHEW] Chew 2 tablets daily. , Disp: , Rfl:     sertraline (ZOLOFT) 50 MG tablet, Take 1 tablet by mouth once daily, Disp: 90 tablet, Rfl: 3    sotalol (BETAPACE) 80 MG tablet, Take 1 tablet  (80 mg) by mouth every 12 hours, Disp: 180 tablet, Rfl: 3    neomycin-polymyxin-dexAMETHasone (MAXITROL) 3.5-92556-3.1 SUSP ophthalmic susp, Place 1-2 drops into both eyes daily as needed (use sparingly to upper eyelids as needed) (Patient not taking: Reported on 9/9/2024), Disp: 1 mL, Rfl: 0    propranolol ER (INDERAL LA) 60 MG 24 hr capsule, Take 1 capsule by mouth once daily (Patient not taking: Reported on 9/9/2024), Disp: 90 capsule, Rfl: 2     Allergies   Allergen Reactions    Essential Oils [External Allergen Needs Reconciliation - See Comment] Shortness Of Breath, Cough and Headache    Hydralazine Nausea and Vomiting and Headache     Possibly. 12 hrs of Nausea and headache after one 10 mg dose    Adhesive Tape-Silicones [Adhesive Tape] Unknown     Bandaids    Codeine Unknown    Darvon [Propoxyphene] Unknown    Latex Unknown    Monistat 1 (Tioconazole) [Tioconazole] Unknown    Neurontin [Gabapentin] Unknown    Oxycodone Unknown    Paxil [Paroxetine] Unknown    Vicodin [Hydrocodone-Acetaminophen] Unknown    Citalopram Anxiety    Paroxetine Anxiety        Family History   Problem Relation Age of Onset    Breast Cancer Mother 80.00        Social History:  She  reports that she has never smoked. She has never been exposed to tobacco smoke. She has never used smokeless tobacco. She reports that she does not drink alcohol and does not use drugs.     PHYSICAL EXAM:  BP (!) 143/89   Pulse 71   SpO2 99%      NEURO:      9/9/2024    11:00 AM   Tremor Motor Scale   Assessment Time 11:57   Medication On   DBS - Right Brain None   DBS - Left Brain None   Head 0.5   Face & Jaw 2   Voice 0.5   Outstretched - RIGHT 1.5   Outstretched - LEFT 1   Wingbeating - RIGHT 1.5   Wingbeating - LEFT 1   Kinetic - RIGHT 1.5   Kinetic - LEFT 1   Lower Limb - RIGHT 0   Lower Limb - LEFT 0   Lower Limb (Max) 0   Trunk (Standing) 0   Axial 3     Gait: Normal base, arm swing, stride length and turn.     Last TETRAS was 15 on  3/11/24    DATA REVIEWED:  Reviewed pertinent data available in [facility].    ASSESSMENT:  78-year-old female with longstanding essential tremor, on a very low-dose of propranolol but doing well, still has some residual tremors, but denies any interference of them in her activities of daily living.  No side effects to current therapy. She prefers to continue with current dose of propranolol.     PLAN:  -Reviewed impression plan with patient    - Continue supportive care.    - Continue the same dose of propranolol for now.    - We will continue to monitor her for side effects moving forward.    - Will continue to follow along.  Since she is doing well from now we will set up her next appointment in about one year.  Sooner if needed.  She was encouraged to contact them back in the meantime should there be any questions, concerns, any others that may arise until then.    Patient Instructions   We're glad to hear that you continue to do well! As long as you are not bothered by your tremor, we do not need to make any changes to your medications. If your tremor becomes more bothersome or if you are having side effects to propranolol (lightheadedness, fatigue, etc), we can consider making adjustments.     Tremor tends to worsen with any physical, mental or emotional stressors. Once the underlying problem has gotten better, the tremor should go back to baseline.     Follow up in 12 months. Please reach out with any questions or concerns prior to your next appointment.     Keep dancing and staying active!    Ms. Hallman was seen and discussed with movement disorder attending, Dr. Gaytan.     Chelita Fuentes MD  Movement Disorders Fellow    Time Spent: 30 minutes spent on the date of the encounter doing chart review, history and exam, documentation and further activities as noted above

## 2024-09-09 NOTE — PATIENT INSTRUCTIONS
We're glad to hear that you continue to do well! As long as you are not bothered by your tremor, we do not need to make any changes to your medications. If your tremor becomes more bothersome or if you are having side effects to propranolol (lightheadedness, fatigue, etc), we can consider making adjustments.     Tremor tends to worsen with any physical, mental or emotional stressors. Once the underlying problem has gotten better, the tremor should go back to baseline.     Follow up in 12 months. Please reach out with any questions or concerns prior to your next appointment.     Keep dancing and staying active!

## 2024-09-09 NOTE — LETTER
2024      Abiola Hallman  [address] [place] [address]      Dear Colleague,    Thank you for referring your patient, Abiola Hallman, to the [facility]. Please see a copy of my visit note below.    Department of Neurology  Movement Disorders Division   Return Patient Visit    Patient: Abiola Hallman   MRN: [id number]   : 1945   Date of Visit: 24   PCP: RICKEY LEWIS MD   Referring provider: No ref. provider found    CC:  Return for essential tremor    Interval history:  Ms. Hallman is a 78 year old right-handed female with history significant for multiple medical issues including essential tremor who presents to movement disorder clinic for regular follow-up. Last visit was 2024, at which time no changes were made to her propranolol dose.     Ms. Hallman presents by herself.     She reports that she has an issue once in a while where her little finger on her right hand will lock. Sometimes she is able to write beautifully but sometimes the pinky finger locks up and it seems to drive the tremor. This is not painful. She wonders if her finger locking up is due to prior surgeries she had with the IV being placed in her right hand.     She reports that she and her brother have a little bit of tremor from having had a bad childhood. Her hand was always a little shaky when she was young. She has been prescribed propranolol since her 30s which helped her tremor for a long time. She reports that her tremor really kicked up after having multiple surgeries with IV placed in her hand. Her tremor has been worse since her last eye surgery in .     Since she was last seen, her tremor has stayed about the same. Once in a great while tremor will interfere with writing Melinda cards. For the most part, her tremor doesn't interfere with her function.    Her  continues to have infrequent hip pain from his prior fracture but he is doing much better since he  broke it in a fall in December. They have been able to go back to dancing together. She also uses a recumbent bike in her home which she has found very helpful for her knees.     No dizziness or lightheadedness. She had orthostatic symptoms when she was on too much lisinopril around the time that she was getting her pacemaker placed (2020).    She is still taking Eliquis - no problems.     She denies feeling down, depressed or anxious. She feels much better now because she had some dental work done recently.     ROS:  All others negative except as listed above. Pertinent movement disorders-specific ROS listed above.    Past Medical History:   Diagnosis Date     Breast cancer (H) 01/01/2007     Hypertension      Retinal detachment      Scoliosis     sx at age 53     Stroke (H)      Tremor         Past Surgical History:   Procedure Laterality Date     ABDOMEN SURGERY       BACK SURGERY       BIOPSY BREAST Right 01/01/2008 2011, 2012     BIOPSY BREAST Left 01/01/2007     CATARACT IOL, RT/LT Bilateral      EP PACEMAKER INSERT N/A 09/14/2020    Procedure: EP Pacemaker Insertion;  Surgeon: Cl Massey MD;  Location: [facility];  Service: Cardiology     LUMPECTOMY BREAST Left 01/01/2007     MASTECTOMY Left 01/01/2007     RETINAL REATTACHMENT Right           Current Outpatient Medications:      acetaminophen (TYLENOL) 325 MG tablet, [ACETAMINOPHEN (TYLENOL) 325 MG TABLET] Take 1 tablet (325 mg total) by mouth every 4 (four) hours as needed., Disp: , Rfl: 0     apixaban ANTICOAGULANT (ELIQUIS ANTICOAGULANT) 5 MG tablet, Take 1 tablet (5 mg) by mouth every 12 hours, Disp: 180 tablet, Rfl: 3     atorvastatin (LIPITOR) 10 MG tablet, TAKE 1 TABLET BY MOUTH AT BEDTIME, Disp: 90 tablet, Rfl: 2     famotidine (PEPCID) 20 MG tablet, [FAMOTIDINE (PEPCID) 20 MG TABLET] Take 1 tablet (20 mg total) by mouth daily. (for stomach acid), Disp: , Rfl: 0     lisinopril (ZESTRIL) 2.5 MG tablet, Take 1 tablet (2.5 mg) by mouth  2 times daily, Disp: 180 tablet, Rfl: 3     multivitamin Chew, [MULTIVITAMIN CHEW] Chew 2 tablets daily. , Disp: , Rfl:      sertraline (ZOLOFT) 50 MG tablet, Take 1 tablet by mouth once daily, Disp: 90 tablet, Rfl: 3     sotalol (BETAPACE) 80 MG tablet, Take 1 tablet (80 mg) by mouth every 12 hours, Disp: 180 tablet, Rfl: 3     neomycin-polymyxin-dexAMETHasone (MAXITROL) 3.5-87676-5.1 SUSP ophthalmic susp, Place 1-2 drops into both eyes daily as needed (use sparingly to upper eyelids as needed) (Patient not taking: Reported on 9/9/2024), Disp: 1 mL, Rfl: 0     propranolol ER (INDERAL LA) 60 MG 24 hr capsule, Take 1 capsule by mouth once daily (Patient not taking: Reported on 9/9/2024), Disp: 90 capsule, Rfl: 2     Allergies   Allergen Reactions     Essential Oils [External Allergen Needs Reconciliation - See Comment] Shortness Of Breath, Cough and Headache     Hydralazine Nausea and Vomiting and Headache     Possibly. 12 hrs of Nausea and headache after one 10 mg dose     Adhesive Tape-Silicones [Adhesive Tape] Unknown     Bandaids     Codeine Unknown     Darvon [Propoxyphene] Unknown     Latex Unknown     Monistat 1 (Tioconazole) [Tioconazole] Unknown     Neurontin [Gabapentin] Unknown     Oxycodone Unknown     Paxil [Paroxetine] Unknown     Vicodin [Hydrocodone-Acetaminophen] Unknown     Citalopram Anxiety     Paroxetine Anxiety        Family History   Problem Relation Age of Onset     Breast Cancer Mother 80.00        Social History:  She  reports that she has never smoked. She has never been exposed to tobacco smoke. She has never used smokeless tobacco. She reports that she does not drink alcohol and does not use drugs.     PHYSICAL EXAM:  BP (!) 143/89   Pulse 71   SpO2 99%      NEURO:      9/9/2024    11:00 AM   Tremor Motor Scale   Assessment Time 11:57   Medication On   DBS - Right Brain None   DBS - Left Brain None   Head 0.5   Face & Jaw 2   Voice 0.5   Outstretched - RIGHT 1.5   Outstretched - LEFT 1    Wingbeating - RIGHT 1.5   Wingbeating - LEFT 1   Kinetic - RIGHT 1.5   Kinetic - LEFT 1   Lower Limb - RIGHT 0   Lower Limb - LEFT 0   Lower Limb (Max) 0   Trunk (Standing) 0   Axial 3     Gait: Normal base, arm swing, stride length and turn.     Last TETRAS was 15 on 3/11/24    DATA REVIEWED:  Reviewed pertinent data available in epic.    ASSESSMENT:  78-year-old female with longstanding essential tremor, on a very low-dose of propranolol but doing well, still has some residual tremors, but denies any interference of them in her activities of daily living.  No side effects to current therapy. She prefers to continue with current dose of propranolol.     PLAN:  -Reviewed impression plan with patient    - Continue supportive care.    - Continue the same dose of propranolol for now.    - We will continue to monitor her for side effects moving forward.    - Will continue to follow along.  Since she is doing well from now we will set up her next appointment in about one year.  Sooner if needed.  She was encouraged to contact them back in the meantime should there be any questions, concerns, any others that may arise until then.    Patient Instructions   We're glad to hear that you continue to do well! As long as you are not bothered by your tremor, we do not need to make any changes to your medications. If your tremor becomes more bothersome or if you are having side effects to propranolol (lightheadedness, fatigue, etc), we can consider making adjustments.     Tremor tends to worsen with any physical, mental or emotional stressors. Once the underlying problem has gotten better, the tremor should go back to baseline.     Follow up in 12 months. Please reach out with any questions or concerns prior to your next appointment.     Keep dancing and staying active!    Ms. Hallman was seen and discussed with movement disorder attending, Dr. Gaytan.     Chelita Fuentes MD  Movement Disorders Fellow    Time Spent: 30 minutes  spent on the date of the encounter doing chart review, history and exam, documentation and further activities as noted above      I, Tommie Perez MD, personally interviewed, examined and evaluated this patient. I personally reviewed the vital signs, medications and pertinent labs/imaging. I discussed the patient with Dr. Fuentes and agree with the assessment, examination and plan of care documented, with the additions and/or exceptions delineated below:      Patient returns to clinic and continues to do well.  She continues to be on the propranolol at the same dose that she has she denies any side effects to it.  She still has some residual tremors, but at this point she believes that they are not disabling, as they do not seem to interfere with her activities of daily living.    No interval development of any other issues besides tremors.  She continues to have no other symptoms that could be attributable to nonmotor manifestations of a parkinsonian syndrome.    Her examination remains very reassuring.  Tremor scores are virtually unchanged compared to prior assessments.    Since her remaining tremors do not seem to be posing any source of disability for her we decided to continue the same dosage of propranolol moving forward.    She was encouraged to continue to maintain healthy life habits.  We also spent some time talking about the fact that, if she has any sort of either physical or emotional distress, we often see a transient tremor breakthrough, which will be different than a disease progression.  In those situations, as long as there is some resolution of the inciting factor, there is no actual need to adjust any medications, and she was in agreement with that as well.    She actually was offered the possibility to return may need a 6 to 12 months, she opted for an annual visit which I think is reasonable.  We can see her anytime sooner than that, and she was encouraged to contact us back in the  interim should there be any questions or concerns or if she should experience any change in her symptoms.      The longitudinal plan of care for Abiola was addressed during this visit. Due to the added complexity in care, I will continue to support Abiola Hallman in the subsequent management of this condition(s) and with the ongoing continuity of care of this condition(s).    Attending attestation: Today a total 30 minutes were spent on this case, in face-to-face, examining, obtaining history, providing education and coordination of care. Additional time was spent in chart review, ancillary data, and documentation as delineated above.      Again, thank you for allowing me to participate in the care of your patient.        Sincerely,        Tommie Perez MD

## 2024-09-09 NOTE — NURSING NOTE
"Abiola Hallman is a 78 year old female who presents for:  Chief Complaint   Patient presents with    Follow Up     Follow Up 6 Months  Movement Disorder        Initial Vitals:  BP (!) 143/89   Pulse 71   SpO2 99%  Estimated body mass index is 31.58 kg/m  as calculated from the following:    Height as of 4/2/24: 1.626 m (5' 4\").    Weight as of 4/2/24: 83.5 kg (184 lb).. There is no height or weight on file to calculate BSA. BP completed using cuff size: regular  Data Unavailable    Nursing Comments:     Viki Torres MA   "

## 2024-09-10 ENCOUNTER — TELEPHONE (OUTPATIENT)
Dept: INTERNAL MEDICINE | Facility: CLINIC | Age: 79
End: 2024-09-10
Payer: COMMERCIAL

## 2024-09-10 DIAGNOSIS — G25.0 BENIGN ESSENTIAL TREMOR: Primary | ICD-10-CM

## 2024-09-10 RX ORDER — PROPRANOLOL HYDROCHLORIDE 10 MG/1
10 TABLET ORAL 2 TIMES DAILY
Qty: 60 TABLET | Refills: 3 | Status: SHIPPED | OUTPATIENT
Start: 2024-09-10

## 2024-09-10 NOTE — TELEPHONE ENCOUNTER
New Medication Request        What medication are you requesting?: Propanolol lower dose    Reason for medication request: Pt is requesting a lower dose of the propanolol due to feeling like it is too strong    Have you taken this medication before?: Yes: currently takes    Controlled Substance Agreement on file:   CSA -- Patient Level:    CSA: None found at the patient level.         Patient offered an appointment? No    Preferred Pharmacy:   [facility] [id number] [facility] [address] [address] [facility] [place] [address]  Phone: [phone] Fax: [phone]      Could we send this information to you in [place] or would you prefer to receive a phone call?:   Patient would prefer a phone call   Okay to leave a detailed message?: Yes at Home number on file [phone] (home)

## 2024-09-10 NOTE — TELEPHONE ENCOUNTER
Patient confirms she wants to make that change in medication. Patient verified pharmacy to send prescription would be: [id number] - [place], [place] - [address]     Patient also wishes to tell PCP that she sees Neurology as per Dr Sarkar's recommendation. She reported that neuro told her she is doing well.

## 2024-09-10 NOTE — TELEPHONE ENCOUNTER
LOV 4/2/24  Paroxysmal atrial fibrillation  on sotalol rhythm control  Pacemaker data apparently has not detected any ongoing atrial fibrillation.  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    sotalol (BETAPACE) 80 MG tablet twice a day      Echo 9-    Left ventricle ejection fraction is normal. The estimated left ventricular ejection fraction is 55%.    Normal right ventricular size and systolic function.    No hemodynamically significant valvular heart abnormalities.    When compared to the previous study dated 9/16/2019, No changes noted

## 2024-09-10 NOTE — TELEPHONE ENCOUNTER
Please tell Abiola that propranolol extended release 60 mg is the smallest dose of the extended release capsule.    Tell her that if she wants to take a smaller dose to propranolol, that would mean switching to the immediate release tablet, which is available as 10 mg, which if taken twice a day would be 20 mg daily.    Let me know if she wants to make that change

## 2024-10-03 NOTE — PROGRESS NOTES
I, Tommie Perez MD, personally interviewed, examined and evaluated this patient. I personally reviewed the vital signs, medications and pertinent labs/imaging. I discussed the patient with Dr. Fuentes and agree with the assessment, examination and plan of care documented, with the additions and/or exceptions delineated below:      Patient returns to clinic and continues to do well.  She continues to be on the propranolol at the same dose that she has she denies any side effects to it.  She still has some residual tremors, but at this point she believes that they are not disabling, as they do not seem to interfere with her activities of daily living.    No interval development of any other issues besides tremors.  She continues to have no other symptoms that could be attributable to nonmotor manifestations of a parkinsonian syndrome.    Her examination remains very reassuring.  Tremor scores are virtually unchanged compared to prior assessments.    Since her remaining tremors do not seem to be posing any source of disability for her we decided to continue the same dosage of propranolol moving forward.    She was encouraged to continue to maintain healthy life habits.  We also spent some time talking about the fact that, if she has any sort of either physical or emotional distress, we often see a transient tremor breakthrough, which will be different than a disease progression.  In those situations, as long as there is some resolution of the inciting factor, there is no actual need to adjust any medications, and she was in agreement with that as well.    She actually was offered the possibility to return may need a 6 to 12 months, she opted for an annual visit which I think is reasonable.  We can see her anytime sooner than that, and she was encouraged to contact us back in the interim should there be any questions or concerns or if she should experience any change in her symptoms.      The longitudinal  plan of care for Abiola was addressed during this visit. Due to the added complexity in care, I will continue to support Abiola Hallman in the subsequent management of this condition(s) and with the ongoing continuity of care of this condition(s).    Attending attestation: Today a total 30 minutes were spent on this case, in face-to-face, examining, obtaining history, providing education and coordination of care. Additional time was spent in chart review, ancillary data, and documentation as delineated above.

## 2024-10-29 ENCOUNTER — OFFICE VISIT (OUTPATIENT)
Dept: INTERNAL MEDICINE | Facility: CLINIC | Age: 79
End: 2024-10-29
Payer: COMMERCIAL

## 2024-10-29 VITALS
BODY MASS INDEX: 30.56 KG/M2 | TEMPERATURE: 97.7 F | SYSTOLIC BLOOD PRESSURE: 130 MMHG | DIASTOLIC BLOOD PRESSURE: 70 MMHG | RESPIRATION RATE: 14 BRPM | WEIGHT: 179 LBS | HEART RATE: 78 BPM | OXYGEN SATURATION: 98 % | HEIGHT: 64 IN

## 2024-10-29 DIAGNOSIS — H01.001 BLEPHARITIS OF UPPER EYELIDS OF BOTH EYES, UNSPECIFIED TYPE: Primary | ICD-10-CM

## 2024-10-29 DIAGNOSIS — Z23 PNEUMOCOCCAL VACCINATION ADMINISTERED AT CURRENT VISIT: ICD-10-CM

## 2024-10-29 DIAGNOSIS — Z23 INFLUENZA VACCINATION ADMINISTERED AT CURRENT VISIT: ICD-10-CM

## 2024-10-29 DIAGNOSIS — Z95.0 CARDIAC PACEMAKER IN SITU: ICD-10-CM

## 2024-10-29 DIAGNOSIS — G25.0 BENIGN ESSENTIAL TREMOR: ICD-10-CM

## 2024-10-29 DIAGNOSIS — I48.0 PAF (PAROXYSMAL ATRIAL FIBRILLATION) (H): ICD-10-CM

## 2024-10-29 DIAGNOSIS — Z79.01 CHRONIC ANTICOAGULATION: ICD-10-CM

## 2024-10-29 DIAGNOSIS — H01.004 BLEPHARITIS OF UPPER EYELIDS OF BOTH EYES, UNSPECIFIED TYPE: Primary | ICD-10-CM

## 2024-10-29 PROCEDURE — 99213 OFFICE O/P EST LOW 20 MIN: CPT | Performed by: INTERNAL MEDICINE

## 2024-10-29 PROCEDURE — G2211 COMPLEX E/M VISIT ADD ON: HCPCS | Performed by: INTERNAL MEDICINE

## 2024-10-29 PROCEDURE — G0008 ADMIN INFLUENZA VIRUS VAC: HCPCS | Performed by: INTERNAL MEDICINE

## 2024-10-29 PROCEDURE — 90677 PCV20 VACCINE IM: CPT | Performed by: INTERNAL MEDICINE

## 2024-10-29 PROCEDURE — G0009 ADMIN PNEUMOCOCCAL VACCINE: HCPCS | Performed by: INTERNAL MEDICINE

## 2024-10-29 PROCEDURE — 90662 IIV NO PRSV INCREASED AG IM: CPT | Performed by: INTERNAL MEDICINE

## 2024-10-29 RX ORDER — NEOMYCIN SULFATE, POLYMYXIN B SULFATE, AND DEXAMETHASONE 3.5; 10000; 1 MG/G; [USP'U]/G; MG/G
OINTMENT OPHTHALMIC
Qty: 3.5 G | Refills: 0 | Status: SHIPPED | OUTPATIENT
Start: 2024-10-29

## 2024-10-29 ASSESSMENT — PATIENT HEALTH QUESTIONNAIRE - PHQ9
SUM OF ALL RESPONSES TO PHQ QUESTIONS 1-9: 0
10. IF YOU CHECKED OFF ANY PROBLEMS, HOW DIFFICULT HAVE THESE PROBLEMS MADE IT FOR YOU TO DO YOUR WORK, TAKE CARE OF THINGS AT HOME, OR GET ALONG WITH OTHER PEOPLE: NOT DIFFICULT AT ALL
SUM OF ALL RESPONSES TO PHQ QUESTIONS 1-9: 0
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
## 2024-10-30 ENCOUNTER — ANCILLARY PROCEDURE (OUTPATIENT)
Dept: CARDIOLOGY | Facility: CLINIC | Age: 79
End: 2024-10-30
Attending: INTERNAL MEDICINE
Payer: COMMERCIAL

## 2024-10-30 DIAGNOSIS — Z95.0 CARDIAC PACEMAKER IN SITU: ICD-10-CM

## 2024-10-30 DIAGNOSIS — I48.0 PAF (PAROXYSMAL ATRIAL FIBRILLATION) (H): ICD-10-CM

## 2024-10-30 DIAGNOSIS — I49.5 SICK SINUS SYNDROME (H): ICD-10-CM

## 2024-11-04 LAB
MDC_IDC_LEAD_CONNECTION_STATUS: NORMAL
MDC_IDC_LEAD_CONNECTION_STATUS: NORMAL
MDC_IDC_LEAD_IMPLANT_DT: NORMAL
MDC_IDC_LEAD_IMPLANT_DT: NORMAL
MDC_IDC_LEAD_LOCATION: NORMAL
MDC_IDC_LEAD_LOCATION: NORMAL
MDC_IDC_LEAD_LOCATION_DETAIL_1: NORMAL
MDC_IDC_LEAD_LOCATION_DETAIL_1: NORMAL
MDC_IDC_LEAD_MFG: NORMAL
MDC_IDC_LEAD_MFG: NORMAL
MDC_IDC_LEAD_MODEL: NORMAL
MDC_IDC_LEAD_MODEL: NORMAL
MDC_IDC_LEAD_POLARITY_TYPE: NORMAL
MDC_IDC_LEAD_POLARITY_TYPE: NORMAL
MDC_IDC_LEAD_SERIAL: NORMAL
MDC_IDC_LEAD_SERIAL: NORMAL
MDC_IDC_LEAD_SPECIAL_FUNCTION: NORMAL
MDC_IDC_LEAD_SPECIAL_FUNCTION: NORMAL
MDC_IDC_MSMT_BATTERY_DTM: NORMAL
MDC_IDC_MSMT_BATTERY_REMAINING_LONGEVITY: 115 MO
MDC_IDC_MSMT_BATTERY_RRT_TRIGGER: 2.62
MDC_IDC_MSMT_BATTERY_STATUS: NORMAL
MDC_IDC_MSMT_BATTERY_VOLTAGE: 3.01 V
MDC_IDC_MSMT_LEADCHNL_RA_IMPEDANCE_VALUE: 361 OHM
MDC_IDC_MSMT_LEADCHNL_RA_IMPEDANCE_VALUE: 532 OHM
MDC_IDC_MSMT_LEADCHNL_RA_PACING_THRESHOLD_AMPLITUDE: 0.62 V
MDC_IDC_MSMT_LEADCHNL_RA_PACING_THRESHOLD_PULSEWIDTH: 0.4 MS
MDC_IDC_MSMT_LEADCHNL_RA_SENSING_INTR_AMPL: 2.62 MV
MDC_IDC_MSMT_LEADCHNL_RA_SENSING_INTR_AMPL: 2.62 MV
MDC_IDC_MSMT_LEADCHNL_RV_IMPEDANCE_VALUE: 361 OHM
MDC_IDC_MSMT_LEADCHNL_RV_IMPEDANCE_VALUE: 589 OHM
MDC_IDC_MSMT_LEADCHNL_RV_PACING_THRESHOLD_AMPLITUDE: 0.62 V
MDC_IDC_MSMT_LEADCHNL_RV_PACING_THRESHOLD_PULSEWIDTH: 0.4 MS
MDC_IDC_MSMT_LEADCHNL_RV_SENSING_INTR_AMPL: 4.75 MV
MDC_IDC_MSMT_LEADCHNL_RV_SENSING_INTR_AMPL: 4.75 MV
MDC_IDC_PG_IMPLANT_DTM: NORMAL
MDC_IDC_PG_MFG: NORMAL
MDC_IDC_PG_MODEL: NORMAL
MDC_IDC_PG_SERIAL: NORMAL
MDC_IDC_PG_TYPE: NORMAL
MDC_IDC_SESS_CLINIC_NAME: NORMAL
MDC_IDC_SESS_DTM: NORMAL
MDC_IDC_SESS_TYPE: NORMAL
MDC_IDC_SET_BRADY_AT_MODE_SWITCH_RATE: 171 {BEATS}/MIN
MDC_IDC_SET_BRADY_HYSTRATE: NORMAL
MDC_IDC_SET_BRADY_LOWRATE: 70 {BEATS}/MIN
MDC_IDC_SET_BRADY_MAX_SENSOR_RATE: 130 {BEATS}/MIN
MDC_IDC_SET_BRADY_MAX_TRACKING_RATE: 130 {BEATS}/MIN
MDC_IDC_SET_BRADY_MODE: NORMAL
MDC_IDC_SET_BRADY_PAV_DELAY_LOW: 180 MS
MDC_IDC_SET_BRADY_SAV_DELAY_LOW: 150 MS
MDC_IDC_SET_LEADCHNL_RA_PACING_AMPLITUDE: 1.5 V
MDC_IDC_SET_LEADCHNL_RA_PACING_ANODE_ELECTRODE_1: NORMAL
MDC_IDC_SET_LEADCHNL_RA_PACING_ANODE_LOCATION_1: NORMAL
MDC_IDC_SET_LEADCHNL_RA_PACING_CAPTURE_MODE: NORMAL
MDC_IDC_SET_LEADCHNL_RA_PACING_CATHODE_ELECTRODE_1: NORMAL
MDC_IDC_SET_LEADCHNL_RA_PACING_CATHODE_LOCATION_1: NORMAL
MDC_IDC_SET_LEADCHNL_RA_PACING_POLARITY: NORMAL
MDC_IDC_SET_LEADCHNL_RA_PACING_PULSEWIDTH: 0.4 MS
MDC_IDC_SET_LEADCHNL_RA_SENSING_ANODE_ELECTRODE_1: NORMAL
MDC_IDC_SET_LEADCHNL_RA_SENSING_ANODE_LOCATION_1: NORMAL
MDC_IDC_SET_LEADCHNL_RA_SENSING_CATHODE_ELECTRODE_1: NORMAL
MDC_IDC_SET_LEADCHNL_RA_SENSING_CATHODE_LOCATION_1: NORMAL
MDC_IDC_SET_LEADCHNL_RA_SENSING_POLARITY: NORMAL
MDC_IDC_SET_LEADCHNL_RA_SENSING_SENSITIVITY: 0.45 MV
MDC_IDC_SET_LEADCHNL_RV_PACING_AMPLITUDE: 1.5 V
MDC_IDC_SET_LEADCHNL_RV_PACING_ANODE_ELECTRODE_1: NORMAL
MDC_IDC_SET_LEADCHNL_RV_PACING_ANODE_LOCATION_1: NORMAL
MDC_IDC_SET_LEADCHNL_RV_PACING_CAPTURE_MODE: NORMAL
MDC_IDC_SET_LEADCHNL_RV_PACING_CATHODE_ELECTRODE_1: NORMAL
MDC_IDC_SET_LEADCHNL_RV_PACING_CATHODE_LOCATION_1: NORMAL
MDC_IDC_SET_LEADCHNL_RV_PACING_POLARITY: NORMAL
MDC_IDC_SET_LEADCHNL_RV_PACING_PULSEWIDTH: 0.4 MS
MDC_IDC_SET_LEADCHNL_RV_SENSING_ANODE_ELECTRODE_1: NORMAL
MDC_IDC_SET_LEADCHNL_RV_SENSING_ANODE_LOCATION_1: NORMAL
MDC_IDC_SET_LEADCHNL_RV_SENSING_CATHODE_ELECTRODE_1: NORMAL
MDC_IDC_SET_LEADCHNL_RV_SENSING_CATHODE_LOCATION_1: NORMAL
MDC_IDC_SET_LEADCHNL_RV_SENSING_POLARITY: NORMAL
MDC_IDC_SET_LEADCHNL_RV_SENSING_SENSITIVITY: 0.9 MV
MDC_IDC_SET_ZONE_DETECTION_INTERVAL: 200 MS
MDC_IDC_SET_ZONE_DETECTION_INTERVAL: 350 MS
MDC_IDC_SET_ZONE_DETECTION_INTERVAL: 400 MS
MDC_IDC_SET_ZONE_STATUS: NORMAL
MDC_IDC_SET_ZONE_TYPE: NORMAL
MDC_IDC_SET_ZONE_VENDOR_TYPE: NORMAL
MDC_IDC_STAT_AT_BURDEN_PERCENT: 0 %
MDC_IDC_STAT_AT_DTM_END: NORMAL
MDC_IDC_STAT_AT_DTM_START: NORMAL
MDC_IDC_STAT_BRADY_AP_VP_PERCENT: 0.03 %
MDC_IDC_STAT_BRADY_AP_VS_PERCENT: 91.6 %
MDC_IDC_STAT_BRADY_AS_VP_PERCENT: 0.01 %
MDC_IDC_STAT_BRADY_AS_VS_PERCENT: 8.36 %
MDC_IDC_STAT_BRADY_DTM_END: NORMAL
MDC_IDC_STAT_BRADY_DTM_START: NORMAL
MDC_IDC_STAT_BRADY_RA_PERCENT_PACED: 90.72 %
MDC_IDC_STAT_BRADY_RV_PERCENT_PACED: 0.04 %
MDC_IDC_STAT_EPISODE_RECENT_COUNT: 0
MDC_IDC_STAT_EPISODE_RECENT_COUNT_DTM_END: NORMAL
MDC_IDC_STAT_EPISODE_RECENT_COUNT_DTM_START: NORMAL
MDC_IDC_STAT_EPISODE_TOTAL_COUNT: 0
MDC_IDC_STAT_EPISODE_TOTAL_COUNT: 2
MDC_IDC_STAT_EPISODE_TOTAL_COUNT: 6
MDC_IDC_STAT_EPISODE_TOTAL_COUNT_DTM_END: NORMAL
MDC_IDC_STAT_EPISODE_TOTAL_COUNT_DTM_START: NORMAL
MDC_IDC_STAT_EPISODE_TYPE: NORMAL
MDC_IDC_STAT_TACHYTHERAPY_RECENT_DTM_END: NORMAL
MDC_IDC_STAT_TACHYTHERAPY_RECENT_DTM_START: NORMAL
MDC_IDC_STAT_TACHYTHERAPY_TOTAL_DTM_END: NORMAL
MDC_IDC_STAT_TACHYTHERAPY_TOTAL_DTM_START: NORMAL

## 2024-11-04 PROCEDURE — 93296 REM INTERROG EVL PM/IDS: CPT | Performed by: INTERNAL MEDICINE

## 2024-11-04 PROCEDURE — 93294 REM INTERROG EVL PM/LDLS PM: CPT | Performed by: INTERNAL MEDICINE

## 2024-12-17 DIAGNOSIS — G25.0 BENIGN ESSENTIAL TREMOR: ICD-10-CM

## 2024-12-18 RX ORDER — PROPRANOLOL HYDROCHLORIDE 10 MG/1
10 TABLET ORAL 2 TIMES DAILY
Qty: 180 TABLET | Refills: 1 | Status: SHIPPED | OUTPATIENT
Start: 2024-12-18

## 2025-01-21 DIAGNOSIS — F32.4 MAJOR DEPRESSIVE DISORDER IN PARTIAL REMISSION, UNSPECIFIED WHETHER RECURRENT: ICD-10-CM

## 2025-01-22 DIAGNOSIS — F32.4 MAJOR DEPRESSIVE DISORDER IN PARTIAL REMISSION, UNSPECIFIED WHETHER RECURRENT: ICD-10-CM

## 2025-01-29 DIAGNOSIS — E78.5 HYPERLIPIDEMIA LDL GOAL <70: ICD-10-CM

## 2025-01-29 RX ORDER — ATORVASTATIN CALCIUM 10 MG/1
10 TABLET, FILM COATED ORAL AT BEDTIME
Qty: 90 TABLET | Refills: 2 | Status: SHIPPED | OUTPATIENT
Start: 2025-01-29

## 2025-02-03 DIAGNOSIS — F32.4 MAJOR DEPRESSIVE DISORDER IN PARTIAL REMISSION, UNSPECIFIED WHETHER RECURRENT: ICD-10-CM

## 2025-03-03 ENCOUNTER — PATIENT OUTREACH (OUTPATIENT)
Dept: CARE COORDINATION | Facility: CLINIC | Age: 80
End: 2025-03-03
Payer: COMMERCIAL

## 2025-03-17 ENCOUNTER — PATIENT OUTREACH (OUTPATIENT)
Dept: CARE COORDINATION | Facility: CLINIC | Age: 80
End: 2025-03-17
Payer: COMMERCIAL

## 2025-03-17 ENCOUNTER — TELEPHONE (OUTPATIENT)
Dept: INTERNAL MEDICINE | Facility: CLINIC | Age: 80
End: 2025-03-17
Payer: COMMERCIAL

## 2025-03-17 NOTE — TELEPHONE ENCOUNTER
Order/Referral Request    Who is requesting:  Patient/ [facility]    Orders being requested:  Podiatrist     Reason service is needed/diagnosis:  Patient states she needs a referral for podiatrist, because she takes eloquist which is a blood thinner and she had scoliosis surgery. she has rods from her neck to her bottom. In order for [facility] to pay for the Podiatrist  Dr. pinon would need to call [facility]. [phone]- [facility]      When are orders needed by: ASAP    Has this been discussed with Provider: No    Does patient have a preference on a Group/Provider/Facility?  [facility]    Does patient have an appointment scheduled?: No    Where to send orders: Place orders within Epic    Could we send this information to you in [facility] or would you prefer to receive a phone call?:   Patient would prefer a phone call   Okay to leave a detailed message?: Yes at Cell number on file:    Telephone Information:   Mobile [phone]

## 2025-03-18 NOTE — TELEPHONE ENCOUNTER
Called to patient. Inform her of options as PCP is out of the clinic.     She will wait until May to discuss this request. No further questions at this time.     CORA August BSN  [facility]  [phone]

## 2025-04-10 DIAGNOSIS — I49.5 SICK SINUS SYNDROME (H): ICD-10-CM

## 2025-04-10 DIAGNOSIS — Z95.0 CARDIAC PACEMAKER IN SITU: Primary | ICD-10-CM

## 2025-04-14 ENCOUNTER — HOSPITAL ENCOUNTER (EMERGENCY)
Facility: CLINIC | Age: 80
Discharge: HOME | End: 2025-04-14
Payer: COMMERCIAL

## 2025-04-14 VITALS
OXYGEN SATURATION: 99 % | WEIGHT: 170 LBS | HEIGHT: 64 IN | HEART RATE: 89 BPM | SYSTOLIC BLOOD PRESSURE: 105 MMHG | TEMPERATURE: 97.8 F | BODY MASS INDEX: 29.02 KG/M2 | DIASTOLIC BLOOD PRESSURE: 73 MMHG | RESPIRATION RATE: 18 BRPM

## 2025-04-14 DIAGNOSIS — R04.0 EPISTAXIS: ICD-10-CM

## 2025-04-14 PROCEDURE — 99282 EMERGENCY DEPT VISIT SF MDM: CPT

## 2025-04-14 ASSESSMENT — COLUMBIA-SUICIDE SEVERITY RATING SCALE - C-SSRS
6. HAVE YOU EVER DONE ANYTHING, STARTED TO DO ANYTHING, OR PREPARED TO DO ANYTHING TO END YOUR LIFE?: NO
2. HAVE YOU ACTUALLY HAD ANY THOUGHTS OF KILLING YOURSELF IN THE PAST MONTH?: NO
1. IN THE PAST MONTH, HAVE YOU WISHED YOU WERE DEAD OR WISHED YOU COULD GO TO SLEEP AND NOT WAKE UP?: NO

## 2025-04-14 ASSESSMENT — ACTIVITIES OF DAILY LIVING (ADL): ADLS_ACUITY_SCORE: 41

## 2025-04-14 NOTE — DISCHARGE INSTRUCTIONS
Your emergency department evaluation today is reassuring.  Please follow-up with your PCP for further evaluation and management of your nosebleeds.  Please continue taking your Eliquis as prescribed until you are able to follow-up with your primary care provider for further recommendations.  Please avoid blowing your nose, picking at the scab, or any heavy lifting.

## 2025-04-14 NOTE — ED PROVIDER NOTES
Emergency Department Encounter   NAME: Abiola Hallman ; AGE: 79 year old female ; YOB: 1945 ; MRN: [id number] ; PCP: Olvin Sarkar   ED PROVIDER: Carol Kim PA-C    Evaluation Date & Time:   No admission date for patient encounter.    CHIEF COMPLAINT:  Epistaxis      Impression and Plan   MDM: Abiola Hallman is a 79 year old female with a pertinent history of CVA on Eliquis, paroxysmal atrial fibrillation status post cardiac pacemaker placement who presents to the ED for evaluation of epistaxis. For the past 3 days, she has had about 5 episodes of epistaxis.  She states the bleeding lasts for about 5 minutes, and stops with pressure and placing a Kleenex in her nose.  She denies any nose or head trauma, and she denies any lightheadedness, weakness, or loss of consciousness.  She was told to go to the ED by her PCP if this bleed persists for cauterization. Stopped taking her eliquis this morning without alerting her PCP.    Vitals reviewed and are stable.  Patient is not hypertensive. On exam patient is not currently having a nosebleed, and I do not note any trauma to her external nose.  On examination of the internal left nare, I can see a small polyp on the medial side.  No scabbing or clot seen.  No notable neurological deficits such as unilateral weakness, facial droop, slurred speech.    Since I was able to visualize a polyp on physical exam and her PCP expressed possible need for cauterization, I did use silver nitrate to cauterize the area.  I discussed at length with the patient why she should continue taking her Eliquis unless otherwise told by a medical professional, as her risk for stroke or blood clot goes up if she does not remain compliant, and I am more concerned about her mild intermittent nosebleeds.  I reminded her to avoid blowing her nose or any heavy lifting for the time being and to follow-up with her primary care provider for further evaluation and management of her  symptoms.  Since she is not lightheaded and reports no significant blood loss with these minor nosebleeds and her hemoglobin was last 14 on 4/2/2024, I do not feel that checking her hemoglobin is necessary at this time.  Patient verbalizes understanding of these discussions and will follow-up with her PCP and continue taking her Eliquis.      ED COURSE:  11:48 AM I met and introduced myself to the patient. I gathered initial history and performed my physical exam. We discussed plan for initial workup.     I rechecked the patient and discussed results, discharge, follow up, and reasons to return to the ED.     At the conclusion of the encounter I discussed the results of all the tests and the disposition. The questions were answered. The patient or family acknowledged understanding and was agreeable with the care plan.    Medical Decision Making  Discharge. No recommendations on prescription strength medication(s). See documentation for any additional details.    MIPS (CTPE, Dental pain, [place], Sinusitis, Asthma/COPD, Head Trauma): Not Applicable    SEPSIS: None        FINAL IMPRESSION:    ICD-10-CM    1. Epistaxis  R04.0             MEDICATIONS GIVEN IN THE EMERGENCY DEPARTMENT:  Medications - No data to display      NEW PRESCRIPTIONS STARTED AT TODAY'S ED VISIT:  Discharge Medication List as of 4/14/2025 11:53 AM            HPI   Use of Intrepreter: N/A     Abiola TYRA Hallman is a 79 year old female with a pertinent history of CVA on Eliquis, paroxysmal atrial fibrillation status post cardiac pacemaker placement who presents to the ED for evaluation of epistaxis.  For the past few months, patient has been experiencing intermittent epistaxis episodes that last about 5 minutes.  For the past 3 days, she has had about 5 episodes of epistaxis.  She states the bleeding lasted for about 5 minutes, and stops with pressure and placing a Kleenex in her nose.  She denies any nose or head trauma, and she denies any  "lightheadedness, weakness, or loss of consciousness.  She denies any significant blood loss.  She stopped taking her Eliquis this morning for fear that it was causing her nosebleeds, but she did not consult her primary care provider prior to doing so.      REVIEW OF SYSTEMS:  Pertinent positive and negative symptoms per HPI.       Medical History     Past Medical History:   Diagnosis Date    Breast cancer (H) 01/01/2007    Hypertension     Retinal detachment     Scoliosis     Stroke (H)     Tremor        Past Surgical History:   Procedure Laterality Date    ABDOMEN SURGERY      BACK SURGERY      BIOPSY BREAST Right 01/01/2008 2011, 2012    BIOPSY BREAST Left 01/01/2007    CATARACT IOL, RT/LT Bilateral     EP PACEMAKER INSERT N/A 09/14/2020    Procedure: EP Pacemaker Insertion;  Surgeon: Cl Massey MD;  Location: [facility];  Service: Cardiology    LUMPECTOMY BREAST Left 01/01/2007    MASTECTOMY Left 01/01/2007    RETINAL REATTACHMENT Right        Family History   Problem Relation Age of Onset    Breast Cancer Mother 80.00       Social History     Tobacco Use    Smoking status: Never     Passive exposure: Never    Smokeless tobacco: Never   Substance Use Topics    Alcohol use: Never    Drug use: Never       acetaminophen (TYLENOL) 325 MG tablet  apixaban ANTICOAGULANT (ELIQUIS ANTICOAGULANT) 5 MG tablet  atorvastatin (LIPITOR) 10 MG tablet  famotidine (PEPCID) 20 MG tablet  lisinopril (ZESTRIL) 2.5 MG tablet  multivitamin Chew  neomycin-polymyxin-dexAMETHasone (MAXITROL) 3.5-98083-9.1 ophthalmic ointment  neomycin-polymyxin-dexAMETHasone (MAXITROL) 3.5-33628-7.1 SUSP ophthalmic susp  propranolol (INDERAL) 10 MG tablet  sertraline (ZOLOFT) 50 MG tablet  sotalol (BETAPACE) 80 MG tablet          Physical Exam     First Vitals:  Patient Vitals for the past 24 hrs:   BP Temp Temp src Pulse Resp SpO2 Height Weight   04/14/25 1115 105/73 97.8  F (36.6  C) Oral 89 18 99 % 1.626 m (5' 4\") 77.1 kg (170 lb) "         PHYSICAL EXAM:   Physical Exam  Constitutional:       General: She is not in acute distress.     Appearance: Normal appearance. She is not diaphoretic.   HENT:      Head: Atraumatic.      Nose:      Comments: patient is not currently having a nosebleed, and I do not note any trauma to her external nose.  On examination of the internal left nare, I can see a small polyp on the medial side.  No scabbing or clot seen.       Mouth/Throat:      Mouth: Mucous membranes are moist.   Eyes:      General: No scleral icterus.     Conjunctiva/sclera: Conjunctivae normal.   Pulmonary:      Effort: No respiratory distress.   Abdominal:      General: Abdomen is flat.   Musculoskeletal:         General: Normal range of motion.      Cervical back: Neck supple.   Skin:     General: Skin is warm.      Findings: No rash.   Neurological:      General: No focal deficit present.      Mental Status: She is alert and oriented to person, place, and time.      Cranial Nerves: No cranial nerve deficit.             Results     LAB:  All pertinent labs reviewed and interpreted  Labs Ordered and Resulted from Time of ED Arrival to Time of ED Departure - No data to display    RADIOLOGY:  No orders to display         PROCEDURES:  none       Carol Kim PA-C   Emergency Medicine   [facility] EMERGENCY ROOM        Carol Kim PA-C  04/14/25 [id number]

## 2025-04-14 NOTE — ED TRIAGE NOTES
Pt presents to the ED with c/o nose bleed over the past 3 days. Pt on thinners. Pt was told to go to ED for cauterization if it persists. Currently bleeding controlled.

## 2025-04-17 ENCOUNTER — OFFICE VISIT (OUTPATIENT)
Dept: CARDIOLOGY | Facility: CLINIC | Age: 80
End: 2025-04-17
Attending: INTERNAL MEDICINE
Payer: COMMERCIAL

## 2025-04-17 VITALS
DIASTOLIC BLOOD PRESSURE: 82 MMHG | SYSTOLIC BLOOD PRESSURE: 119 MMHG | RESPIRATION RATE: 16 BRPM | BODY MASS INDEX: 32.44 KG/M2 | HEIGHT: 64 IN | WEIGHT: 190 LBS | HEART RATE: 79 BPM | OXYGEN SATURATION: 98 %

## 2025-04-17 DIAGNOSIS — I48.0 PAF (PAROXYSMAL ATRIAL FIBRILLATION) (H): ICD-10-CM

## 2025-04-17 DIAGNOSIS — I10 BENIGN ESSENTIAL HYPERTENSION: Chronic | ICD-10-CM

## 2025-04-17 DIAGNOSIS — I48.0 PAF (PAROXYSMAL ATRIAL FIBRILLATION) (H): Primary | ICD-10-CM

## 2025-04-17 DIAGNOSIS — Z95.0 CARDIAC PACEMAKER IN SITU: ICD-10-CM

## 2025-04-17 DIAGNOSIS — I49.5 SICK SINUS SYNDROME (H): ICD-10-CM

## 2025-04-17 LAB
ATRIAL RATE - MUSE: 82 BPM
DIASTOLIC BLOOD PRESSURE - MUSE: NORMAL MMHG
INTERPRETATION ECG - MUSE: NORMAL
MDC_IDC_LEAD_CONNECTION_STATUS: NORMAL
MDC_IDC_LEAD_CONNECTION_STATUS: NORMAL
MDC_IDC_LEAD_IMPLANT_DT: NORMAL
MDC_IDC_LEAD_IMPLANT_DT: NORMAL
MDC_IDC_LEAD_LOCATION: NORMAL
MDC_IDC_LEAD_LOCATION: NORMAL
MDC_IDC_LEAD_LOCATION_DETAIL_1: NORMAL
MDC_IDC_LEAD_LOCATION_DETAIL_1: NORMAL
MDC_IDC_LEAD_MFG: NORMAL
MDC_IDC_LEAD_MFG: NORMAL
MDC_IDC_LEAD_MODEL: NORMAL
MDC_IDC_LEAD_MODEL: NORMAL
MDC_IDC_LEAD_POLARITY_TYPE: NORMAL
MDC_IDC_LEAD_POLARITY_TYPE: NORMAL
MDC_IDC_LEAD_SERIAL: NORMAL
MDC_IDC_LEAD_SERIAL: NORMAL
MDC_IDC_LEAD_SPECIAL_FUNCTION: NORMAL
MDC_IDC_LEAD_SPECIAL_FUNCTION: NORMAL
MDC_IDC_MSMT_BATTERY_DTM: NORMAL
MDC_IDC_MSMT_BATTERY_REMAINING_LONGEVITY: 110 MO
MDC_IDC_MSMT_BATTERY_RRT_TRIGGER: 2.62
MDC_IDC_MSMT_BATTERY_STATUS: NORMAL
MDC_IDC_MSMT_BATTERY_VOLTAGE: 3.01 V
MDC_IDC_MSMT_LEADCHNL_RA_IMPEDANCE_VALUE: 361 OHM
MDC_IDC_MSMT_LEADCHNL_RA_IMPEDANCE_VALUE: 532 OHM
MDC_IDC_MSMT_LEADCHNL_RA_PACING_THRESHOLD_AMPLITUDE: 0.75 V
MDC_IDC_MSMT_LEADCHNL_RA_PACING_THRESHOLD_PULSEWIDTH: 0.4 MS
MDC_IDC_MSMT_LEADCHNL_RA_SENSING_INTR_AMPL: 2.6 MV
MDC_IDC_MSMT_LEADCHNL_RV_IMPEDANCE_VALUE: 380 OHM
MDC_IDC_MSMT_LEADCHNL_RV_IMPEDANCE_VALUE: 627 OHM
MDC_IDC_MSMT_LEADCHNL_RV_PACING_THRESHOLD_AMPLITUDE: 0.75 V
MDC_IDC_MSMT_LEADCHNL_RV_PACING_THRESHOLD_PULSEWIDTH: 0.4 MS
MDC_IDC_MSMT_LEADCHNL_RV_SENSING_INTR_AMPL: 7 MV
MDC_IDC_PG_IMPLANT_DTM: NORMAL
MDC_IDC_PG_MFG: NORMAL
MDC_IDC_PG_MODEL: NORMAL
MDC_IDC_PG_SERIAL: NORMAL
MDC_IDC_PG_TYPE: NORMAL
MDC_IDC_SESS_CLINIC_NAME: NORMAL
MDC_IDC_SESS_DTM: NORMAL
MDC_IDC_SESS_TYPE: NORMAL
MDC_IDC_SET_BRADY_AT_MODE_SWITCH_RATE: 171 {BEATS}/MIN
MDC_IDC_SET_BRADY_HYSTRATE: NORMAL
MDC_IDC_SET_BRADY_LOWRATE: 70 {BEATS}/MIN
MDC_IDC_SET_BRADY_MAX_SENSOR_RATE: 130 {BEATS}/MIN
MDC_IDC_SET_BRADY_MAX_TRACKING_RATE: 130 {BEATS}/MIN
MDC_IDC_SET_BRADY_MODE: NORMAL
MDC_IDC_SET_BRADY_PAV_DELAY_LOW: 180 MS
MDC_IDC_SET_BRADY_SAV_DELAY_LOW: 150 MS
MDC_IDC_SET_LEADCHNL_RA_PACING_AMPLITUDE: NORMAL
MDC_IDC_SET_LEADCHNL_RA_PACING_ANODE_ELECTRODE_1: NORMAL
MDC_IDC_SET_LEADCHNL_RA_PACING_ANODE_LOCATION_1: NORMAL
MDC_IDC_SET_LEADCHNL_RA_PACING_CAPTURE_MODE: NORMAL
MDC_IDC_SET_LEADCHNL_RA_PACING_CATHODE_ELECTRODE_1: NORMAL
MDC_IDC_SET_LEADCHNL_RA_PACING_CATHODE_LOCATION_1: NORMAL
MDC_IDC_SET_LEADCHNL_RA_PACING_POLARITY: NORMAL
MDC_IDC_SET_LEADCHNL_RA_PACING_PULSEWIDTH: 0.4 MS
MDC_IDC_SET_LEADCHNL_RA_SENSING_ANODE_ELECTRODE_1: NORMAL
MDC_IDC_SET_LEADCHNL_RA_SENSING_ANODE_LOCATION_1: NORMAL
MDC_IDC_SET_LEADCHNL_RA_SENSING_CATHODE_ELECTRODE_1: NORMAL
MDC_IDC_SET_LEADCHNL_RA_SENSING_CATHODE_LOCATION_1: NORMAL
MDC_IDC_SET_LEADCHNL_RA_SENSING_POLARITY: NORMAL
MDC_IDC_SET_LEADCHNL_RA_SENSING_SENSITIVITY: 0.45 MV
MDC_IDC_SET_LEADCHNL_RV_PACING_AMPLITUDE: NORMAL
MDC_IDC_SET_LEADCHNL_RV_PACING_ANODE_ELECTRODE_1: NORMAL
MDC_IDC_SET_LEADCHNL_RV_PACING_ANODE_LOCATION_1: NORMAL
MDC_IDC_SET_LEADCHNL_RV_PACING_CAPTURE_MODE: NORMAL
MDC_IDC_SET_LEADCHNL_RV_PACING_CATHODE_ELECTRODE_1: NORMAL
MDC_IDC_SET_LEADCHNL_RV_PACING_CATHODE_LOCATION_1: NORMAL
MDC_IDC_SET_LEADCHNL_RV_PACING_POLARITY: NORMAL
MDC_IDC_SET_LEADCHNL_RV_PACING_PULSEWIDTH: 0.4 MS
MDC_IDC_SET_LEADCHNL_RV_SENSING_ANODE_ELECTRODE_1: NORMAL
MDC_IDC_SET_LEADCHNL_RV_SENSING_ANODE_LOCATION_1: NORMAL
MDC_IDC_SET_LEADCHNL_RV_SENSING_CATHODE_ELECTRODE_1: NORMAL
MDC_IDC_SET_LEADCHNL_RV_SENSING_CATHODE_LOCATION_1: NORMAL
MDC_IDC_SET_LEADCHNL_RV_SENSING_POLARITY: NORMAL
MDC_IDC_SET_LEADCHNL_RV_SENSING_SENSITIVITY: 0.9 MV
MDC_IDC_SET_ZONE_DETECTION_INTERVAL: 200 MS
MDC_IDC_SET_ZONE_DETECTION_INTERVAL: 350 MS
MDC_IDC_SET_ZONE_DETECTION_INTERVAL: 400 MS
MDC_IDC_SET_ZONE_STATUS: NORMAL
MDC_IDC_SET_ZONE_TYPE: NORMAL
MDC_IDC_SET_ZONE_VENDOR_TYPE: NORMAL
MDC_IDC_STAT_AT_BURDEN_PERCENT: 0 %
MDC_IDC_STAT_AT_DTM_END: NORMAL
MDC_IDC_STAT_AT_DTM_START: NORMAL
MDC_IDC_STAT_AT_MODE_SW_COUNT: 0
MDC_IDC_STAT_BRADY_AP_VP_PERCENT: 0.03 %
MDC_IDC_STAT_BRADY_AP_VS_PERCENT: 91.28 %
MDC_IDC_STAT_BRADY_AS_VP_PERCENT: 0.01 %
MDC_IDC_STAT_BRADY_AS_VS_PERCENT: 8.68 %
MDC_IDC_STAT_BRADY_DTM_END: NORMAL
MDC_IDC_STAT_BRADY_DTM_START: NORMAL
MDC_IDC_STAT_BRADY_RA_PERCENT_PACED: 90.38 %
MDC_IDC_STAT_BRADY_RV_PERCENT_PACED: 0.04 %
MDC_IDC_STAT_EPISODE_RECENT_COUNT: 0
MDC_IDC_STAT_EPISODE_RECENT_COUNT_DTM_END: NORMAL
MDC_IDC_STAT_EPISODE_RECENT_COUNT_DTM_START: NORMAL
MDC_IDC_STAT_EPISODE_TOTAL_COUNT: 0
MDC_IDC_STAT_EPISODE_TOTAL_COUNT: 2
MDC_IDC_STAT_EPISODE_TOTAL_COUNT: 6
MDC_IDC_STAT_EPISODE_TOTAL_COUNT_DTM_END: NORMAL
MDC_IDC_STAT_EPISODE_TOTAL_COUNT_DTM_START: NORMAL
MDC_IDC_STAT_EPISODE_TYPE: NORMAL
MDC_IDC_STAT_TACHYTHERAPY_RECENT_DTM_END: NORMAL
MDC_IDC_STAT_TACHYTHERAPY_RECENT_DTM_START: NORMAL
MDC_IDC_STAT_TACHYTHERAPY_TOTAL_DTM_END: NORMAL
MDC_IDC_STAT_TACHYTHERAPY_TOTAL_DTM_START: NORMAL
P AXIS - MUSE: 68 DEGREES
PR INTERVAL - MUSE: 232 MS
QRS DURATION - MUSE: 86 MS
QT - MUSE: 350 MS
QTC - MUSE: 408 MS
R AXIS - MUSE: 43 DEGREES
SYSTOLIC BLOOD PRESSURE - MUSE: NORMAL MMHG
T AXIS - MUSE: 30 DEGREES
VENTRICULAR RATE- MUSE: 82 BPM

## 2025-04-17 RX ORDER — SOTALOL HYDROCHLORIDE 80 MG/1
80 TABLET ORAL EVERY 12 HOURS
Qty: 180 TABLET | Refills: 3 | Status: SHIPPED | OUTPATIENT
Start: 2025-04-17

## 2025-04-17 NOTE — LETTER
4/17/2025    OLVIN LEWIS MD  [address] [place] [address]    RE: Abiola Hallman       Dear Colleague,     I had the pleasure of seeing Abiola Hallman in the [facility].       [facility]  Cardiac Electrophysiology  [address]  [place] [address]   Office: [phone]  Fax: [phone]     HEART CARE ELECTROPHYSIOLOGY FOLLOW UP    Primary Care: Olvin Lewis MD      Assessment/Recommendations     Paroxysmal atrial fibrillation/stage 3A: Historically mild symptoms vs asymptomatic, well-controlled on sotalol    [id number] score of 6 for age >75, gender, HTN, VTE history-TIA; HAS BLED 2 for age >65, recurrent epistaxis.  We briefly discussed percutaneous left atrial appendage closure as an alternative to chronic oral anticoagulation    Sinus node dysfunction, dual-chamber pacemaker in situ: Device interrogation reviewed, appropriate device function.  Stable lead measurements with estimated battery life of 9 years.  Historically AP 90%,  <1%    HTN: controlled    Plan:   Annual EKG today  Continue Eliquis 5 mg twice daily for stroke prophylaxis  Continue sotalol 80 mg twice daily  Follow-up 1 year     History of Present Illness/Subjective    Abiola Hallman is a 79 year old female with past medical history significant for paroxysmal AF, TBS/SND, post right-sided dual chamber pacemaker in situ 9/14/2020, HTN, dyslipidemia, TIA 2019, breast CA status post left mastectomy, scoliosis with prior surgery, essential tremor, seen today for annual device and arrhythmia follow-up    Abiola was first documented in paroxysmal AF in 2020 at ED evaluation for worsening tremors, elevated blood pressure and rapid heart rate by her home blood pressure cuff. Rates improved with IV diltiazem and she was discharged with her home propranolol with additional tablet as needed for elevated heart rate. At home she developed palpitations and heart racing sensation and  subsequently developed symptomatic bradycardia after additional beta blocker doses for which she returned to the ED and ultimately underwent dual chamber pacemaker implant. She was started on sotalol post implant and continues to have rare atrial fibrillation by device interrogations. She has felt generally well over the past year. She was seen in the ED earlier this week for recurrent epistaxis treated with cautery and has a remote history of spontaneous nosebleed on at least one other occasion years ago. She recently cleaned out her home and all of her closets without any problems. Her  recovered well from surgery and they hope to continue staying in their home. She denies chest discomfort, palpitations, shortness of breath, lightheadedness/dizziness, pedal edema, or syncope.     Data Review     Arrhythmia hx:   Dx/date: Paroxysmal AF 2020 diagnosed at ED evaluation for worsening tremors, elevated blood pressure   Sx: possibly mild tachypalpitations vs asx   [id number] 6-age >75, gender, HTN, VTE history-TIA  OAC: apixaban  Rate control: Metoprolol, propranolol (essential tremor)   AAD: Sotalol  DCCV: None  Ablation: None    EKG 4/11/2024 APVS 82 bpm, QRS 86 ms, QT/QTc 350/408 ms  4/9/2024 APVS 70 bpm, QT//414 ms   Personally reviewed.     TTE 9/12/2020    Left ventricle ejection fraction is normal. The estimated left ventricular ejection fraction is 55%.    Normal right ventricular size and systolic function.    No hemodynamically significant valvular heart abnormalities.    When compared to the previous study dated 9/16/2019, No changes noted    DEVICE: MDT (D) PPM 9/14/2020 (SND/TBS)  4/17/2025  Pacing %/Programmed: AP 90.4% and  <0.1% in AAIR<=>DDDR  bpm mode   Lead(s): Stable trends & measurements   Battery longevity: Estimated at 9.1 years remaining   Presenting rhythm: AP VS at 71 bpm   Underlying rhythm: SR/SB at 39 bpm   Heart rates:  bpm but primarily  bpm per  "histograms with adequate variability & no complaints of activity intolerance   Atrial High rates: None detected   Anticoagulant: Eliquis                  Antiarrhythmic: Sotalol   Ventricular High rates: None detected   Comments: Normal device function.  No programming changes were made.     Annual interrogation 4/9/2024  Pacing mode: AAIR-DDDR 70/130  Presenting rhythm: AP VS  Underlying rhythm: SB 30s  A-paced: 91%.  V-paced <1%.  Battery: estimated longevity 10 years  Atrial and Ventricular leads: Stable with good sensing, impedance, and pacing thresholds  Arrhythmias: Atrial arrhythmia burden <1% with generally controlled ventricular response.  Most recent episode 1/27/2024 up to 2.5 hours with controlled ventricular response. Single episode NSVT 170s 10/18/2023  Histograms: Primarily   Programming changes: None    Device interrogation 2/5/2024 shows paroxysmal AF longest 1.5 hours.  91% AP    I have reviewed and updated the patient's past medical history, allergy list and medication list.          Physical Examination   Vitals: /82 (BP Location: Right arm, Patient Position: Sitting, Cuff Size: Adult Large)   Pulse 79   Resp 16   Ht 1.626 m (5' 4\")   Wt 86.2 kg (190 lb)   LMP  (LMP Unknown)   SpO2 98%   BMI 32.61 kg/m      BMI= Body mass index is 32.61 kg/m .    Wt Readings from Last 3 Encounters:   04/17/25 86.2 kg (190 lb)   04/14/25 77.1 kg (170 lb)   10/29/24 81.2 kg (179 lb)       General   Appearance:   Alert and oriented, no acute distress   HEENT:  Normocephalic and atraumatic   Neck: No JVP, carotid bruit or obvious thyromegaly   Lungs:   Respirations unlabored   Cardiovascular:   Rhythm is regular. S1 and S2 are normal. No significant murmur is present. Lower extremities demonstrate no significant edema   Extremities: No cyanosis or clubbing   Skin: Skin is warm, dry, and otherwise intact   Neurologic: Gait not assessed. Mood and affect appropriate              Medical History  " Surgical History Family History Social History   Past Medical History:   Diagnosis Date     Breast cancer (H) 01/01/2007     Hypertension      Retinal detachment      Scoliosis     sx at age 53     Stroke (H)      Tremor     Past Surgical History:   Procedure Laterality Date     ABDOMEN SURGERY       BACK SURGERY       BIOPSY BREAST Right 01/01/2008 2011, 2012     BIOPSY BREAST Left 01/01/2007     CATARACT IOL, RT/LT Bilateral      EP PACEMAKER INSERT N/A 09/14/2020    Procedure: EP Pacemaker Insertion;  Surgeon: Cl Massey MD;  Location: [facility];  Service: Cardiology     LUMPECTOMY BREAST Left 01/01/2007     MASTECTOMY Left 01/01/2007     RETINAL REATTACHMENT Right     Family History   Problem Relation Age of Onset     Breast Cancer Mother 80.00    Social History     Socioeconomic History     Marital status:      Spouse name: Not on file     Number of children: Not on file     Years of education: Not on file     Highest education level: Not on file   Occupational History     Not on file   Tobacco Use     Smoking status: Never     Passive exposure: Never     Smokeless tobacco: Never   Substance and Sexual Activity     Alcohol use: Never     Drug use: Never     Sexual activity: Not on file   Other Topics Concern     Not on file   Social History Narrative     Not on file     Social Drivers of Health     Financial Resource Strain: Low Risk  (3/28/2024)    Financial Resource Strain      Within the past 12 months, have you or your family members you live with been unable to get utilities (heat, electricity) when it was really needed?: No   Food Insecurity: Low Risk  (3/28/2024)    Food Insecurity      Within the past 12 months, did you worry that your food would run out before you got money to buy more?: No      Within the past 12 months, did the food you bought just not last and you didn t have money to get more?: No   Transportation Needs: Low Risk  (3/28/2024)    Transportation Needs       Within the past 12 months, has lack of transportation kept you from medical appointments, getting your medicines, non-medical meetings or appointments, work, or from getting things that you need?: No   Physical Activity: Not on file   Stress: Not on file   Social Connections: Unknown (3/28/2024)    Social Connection and Isolation Panel [NHANES]      Frequency of Communication with Friends and Family: Not on file      Frequency of Social Gatherings with Friends and Family: More than three times a week      Attends Protestant Services: Not on file      Active Member of Clubs or Organizations: Not on file      Attends Club or Organization Meetings: Not on file      Marital Status: Not on file   Interpersonal Safety: Low Risk  (4/2/2024)    Interpersonal Safety      Do you feel physically and emotionally safe where you currently live?: Yes      Within the past 12 months, have you been hit, slapped, kicked or otherwise physically hurt by someone?: No      Within the past 12 months, have you been humiliated or emotionally abused in other ways by your partner or ex-partner?: No   Housing Stability: Low Risk  (3/28/2024)    Housing Stability      Do you have housing? : Yes      Are you worried about losing your housing?: No          Medications  Allergies   Scheduled Meds:  Current Outpatient Medications   Medication Sig Dispense Refill     acetaminophen (TYLENOL) 325 MG tablet [ACETAMINOPHEN (TYLENOL) 325 MG TABLET] Take 1 tablet (325 mg total) by mouth every 4 (four) hours as needed.  0     apixaban ANTICOAGULANT (ELIQUIS ANTICOAGULANT) 5 MG tablet Take 1 tablet (5 mg) by mouth every 12 hours. 180 tablet 3     atorvastatin (LIPITOR) 10 MG tablet TAKE 1 TABLET BY MOUTH AT BEDTIME 90 tablet 2     famotidine (PEPCID) 20 MG tablet [FAMOTIDINE (PEPCID) 20 MG TABLET] Take 1 tablet (20 mg total) by mouth daily. (for stomach acid)  0     lisinopril (ZESTRIL) 2.5 MG tablet Take 1 tablet (2.5 mg) by mouth 2 times daily 180 tablet 3      multivitamin Chew [MULTIVITAMIN CHEW] Chew 2 tablets daily.        neomycin-polymyxin-dexAMETHasone (MAXITROL) 3.5-87003-8.1 ophthalmic ointment Apply a tiny amount to both eyelids, twice a day, for 3 days at a time per course of treatment. 3.5 g 0     neomycin-polymyxin-dexAMETHasone (MAXITROL) 3.5-05420-5.1 SUSP ophthalmic susp Place 1-2 drops into both eyes daily as needed (use sparingly to upper eyelids as needed) 1 mL 0     propranolol (INDERAL) 10 MG tablet Take 1 tablet by mouth twice daily 180 tablet 1     sertraline (ZOLOFT) 50 MG tablet Take 1 tablet (50 mg) by mouth daily. 90 tablet 3     sotalol (BETAPACE) 80 MG tablet Take 1 tablet (80 mg) by mouth every 12 hours. 180 tablet 3    Allergies   Allergen Reactions     Essential Oils [External Allergen Needs Reconciliation - See Comment] Shortness Of Breath, Cough and Headache     Hydralazine Nausea and Vomiting and Headache     Possibly. 12 hrs of Nausea and headache after one 10 mg dose     Adhesive Tape-Silicones [Adhesive Tape] Unknown     Bandaids     Codeine Unknown     Darvon [Propoxyphene] Unknown     Latex Unknown     Monistat 1 (Tioconazole) [Tioconazole] Unknown     Neurontin [Gabapentin] Unknown     Oxycodone Unknown     Paxil [Paroxetine] Unknown     Vicodin [Hydrocodone-Acetaminophen] Unknown     Citalopram Anxiety     Paroxetine Anxiety         Lab Results    Chemistry/lipid CBC Cardiac Enzymes/BNP/TSH/INR   Lab Results   Component Value Date    CHOL 143 04/02/2024    HDL 52 04/02/2024    TRIG 134 04/02/2024    BUN 19.9 04/02/2024     04/02/2024    CO2 27 04/02/2024    Lab Results   Component Value Date    WBC 8.2 04/02/2024    HGB 14.0 04/02/2024    HCT 43.6 04/02/2024    MCV 95 04/02/2024     04/02/2024    @RESUFAST(BMP,CBC,BNP,TSH,  INR)@      35 minutes spent reviewing prior records (including documentation, laboratory studies, cardiac testing/imaging), history and physical exam, planning, and subsequent documentation.      The longitudinal plan of care for the diagnosis(es)/condition(s) as documented were addressed during this visit. Due to the added complexity in care, I will continue to support Abiola in the subsequent management and with ongoing continuity of care.     This note has been dictated using voice recognition software. Any grammatical, typographical, or context distortions are unintentional and inherent to the software.    Adeline Gibbs CNP  Clinical Cardiac Electrophysiology  [facility]  Clinic and [phone]  Fax: [phone]  Electrophysiology Nurses: [phone]          Thank you for allowing me to participate in the care of your patient.      Sincerely,     RUDOLPH Leal CNP     [facility]  cc:   Collin Benavidez MD  [address]  [place] [address]

## 2025-04-17 NOTE — PROGRESS NOTES
[facility]  Cardiac Electrophysiology  [address]  [place] [address]   Office: [phone]  Fax: [phone]     HEART CARE ELECTROPHYSIOLOGY FOLLOW UP    Primary Care: Olvin Sarkar MD      Assessment/Recommendations     Paroxysmal atrial fibrillation/stage 3A: Historically mild symptoms vs asymptomatic, well-controlled on sotalol    [id number] score of 6 for age >75, gender, HTN, VTE history-TIA; HAS BLED 2 for age >65, recurrent epistaxis.  We briefly discussed percutaneous left atrial appendage closure as an alternative to chronic oral anticoagulation    Sinus node dysfunction, dual-chamber pacemaker in situ: Device interrogation reviewed, appropriate device function.  Stable lead measurements with estimated battery life of 9 years.  Historically AP 90%,  <1%    HTN: controlled    Plan:   Annual EKG today  Continue Eliquis 5 mg twice daily for stroke prophylaxis  Continue sotalol 80 mg twice daily  Follow-up 1 year     History of Present Illness/Subjective    Abiola Hallman is a 79 year old female with past medical history significant for paroxysmal AF, TBS/SND, post right-sided dual chamber pacemaker in situ 9/14/2020, HTN, dyslipidemia, TIA 2019, breast CA status post left mastectomy, scoliosis with prior surgery, essential tremor, seen today for annual device and arrhythmia follow-up    Abiola was first documented in paroxysmal AF in 2020 at ED evaluation for worsening tremors, elevated blood pressure and rapid heart rate by her home blood pressure cuff. Rates improved with IV diltiazem and she was discharged with her home propranolol with additional tablet as needed for elevated heart rate. At home she developed palpitations and heart racing sensation and subsequently developed symptomatic bradycardia after additional beta blocker doses for which she returned to the ED and ultimately underwent dual chamber pacemaker implant. She was started on sotalol  post implant and continues to have rare atrial fibrillation by device interrogations. She has felt generally well over the past year. She was seen in the ED earlier this week for recurrent epistaxis treated with cautery and has a remote history of spontaneous nosebleed on at least one other occasion years ago. She recently cleaned out her home and all of her closets without any problems. Her  recovered well from surgery and they hope to continue staying in their home. She denies chest discomfort, palpitations, shortness of breath, lightheadedness/dizziness, pedal edema, or syncope.     Data Review     Arrhythmia hx:   Dx/date: Paroxysmal AF 2020 diagnosed at ED evaluation for worsening tremors, elevated blood pressure   Sx: possibly mild tachypalpitations vs asx   [id number] 6-age >75, gender, HTN, VTE history-TIA  OAC: apixaban  Rate control: Metoprolol, propranolol (essential tremor)   AAD: Sotalol  DCCV: None  Ablation: None    EKG 4/11/2024 APVS 82 bpm, QRS 86 ms, QT/QTc 350/408 ms  4/9/2024 APVS 70 bpm, QT//414 ms   Personally reviewed.     TTE 9/12/2020    Left ventricle ejection fraction is normal. The estimated left ventricular ejection fraction is 55%.    Normal right ventricular size and systolic function.    No hemodynamically significant valvular heart abnormalities.    When compared to the previous study dated 9/16/2019, No changes noted    DEVICE: MDT (D) PPM 9/14/2020 (SND/TBS)  4/17/2025  Pacing %/Programmed: AP 90.4% and  <0.1% in AAIR<=>DDDR  bpm mode   Lead(s): Stable trends & measurements   Battery longevity: Estimated at 9.1 years remaining   Presenting rhythm: AP VS at 71 bpm   Underlying rhythm: SR/SB at 39 bpm   Heart rates:  bpm but primarily  bpm per histograms with adequate variability & no complaints of activity intolerance   Atrial High rates: None detected   Anticoagulant: Eliquis                  Antiarrhythmic: Sotalol   Ventricular High rates: None  "detected   Comments: Normal device function.  No programming changes were made.     Annual interrogation 4/9/2024  Pacing mode: AAIR-DDDR 70/130  Presenting rhythm: AP VS  Underlying rhythm: SB 30s  A-paced: 91%.  V-paced <1%.  Battery: estimated longevity 10 years  Atrial and Ventricular leads: Stable with good sensing, impedance, and pacing thresholds  Arrhythmias: Atrial arrhythmia burden <1% with generally controlled ventricular response.  Most recent episode 1/27/2024 up to 2.5 hours with controlled ventricular response. Single episode NSVT 170s 10/18/2023  Histograms: Primarily   Programming changes: None    Device interrogation 2/5/2024 shows paroxysmal AF longest 1.5 hours.  91% AP    I have reviewed and updated the patient's past medical history, allergy list and medication list.          Physical Examination   Vitals: /82 (BP Location: Right arm, Patient Position: Sitting, Cuff Size: Adult Large)   Pulse 79   Resp 16   Ht 1.626 m (5' 4\")   Wt 86.2 kg (190 lb)   LMP  (LMP Unknown)   SpO2 98%   BMI 32.61 kg/m      BMI= Body mass index is 32.61 kg/m .    Wt Readings from Last 3 Encounters:   04/17/25 86.2 kg (190 lb)   04/14/25 77.1 kg (170 lb)   10/29/24 81.2 kg (179 lb)       General   Appearance:   Alert and oriented, no acute distress   HEENT:  Normocephalic and atraumatic   Neck: No JVP, carotid bruit or obvious thyromegaly   Lungs:   Respirations unlabored   Cardiovascular:   Rhythm is regular. S1 and S2 are normal. No significant murmur is present. Lower extremities demonstrate no significant edema   Extremities: No cyanosis or clubbing   Skin: Skin is warm, dry, and otherwise intact   Neurologic: Gait not assessed. Mood and affect appropriate              Medical History  Surgical History Family History Social History   Past Medical History:   Diagnosis Date    Breast cancer (H) 01/01/2007    Hypertension     Retinal detachment     Scoliosis     sx at age 53    Stroke (H)     Tremor "     Past Surgical History:   Procedure Laterality Date    ABDOMEN SURGERY      BACK SURGERY      BIOPSY BREAST Right 01/01/2008 2011, 2012    BIOPSY BREAST Left 01/01/2007    CATARACT IOL, RT/LT Bilateral     EP PACEMAKER INSERT N/A 09/14/2020    Procedure: EP Pacemaker Insertion;  Surgeon: Cl Massey MD;  Location: [facility];  Service: Cardiology    LUMPECTOMY BREAST Left 01/01/2007    MASTECTOMY Left 01/01/2007    RETINAL REATTACHMENT Right     Family History   Problem Relation Age of Onset    Breast Cancer Mother 80.00    Social History     Socioeconomic History    Marital status:      Spouse name: Not on file    Number of children: Not on file    Years of education: Not on file    Highest education level: Not on file   Occupational History    Not on file   Tobacco Use    Smoking status: Never     Passive exposure: Never    Smokeless tobacco: Never   Substance and Sexual Activity    Alcohol use: Never    Drug use: Never    Sexual activity: Not on file   Other Topics Concern    Not on file   Social History Narrative    Not on file     Social Drivers of Health     Financial Resource Strain: Low Risk  (3/28/2024)    Financial Resource Strain     Within the past 12 months, have you or your family members you live with been unable to get utilities (heat, electricity) when it was really needed?: No   Food Insecurity: Low Risk  (3/28/2024)    Food Insecurity     Within the past 12 months, did you worry that your food would run out before you got money to buy more?: No     Within the past 12 months, did the food you bought just not last and you didn t have money to get more?: No   Transportation Needs: Low Risk  (3/28/2024)    Transportation Needs     Within the past 12 months, has lack of transportation kept you from medical appointments, getting your medicines, non-medical meetings or appointments, work, or from getting things that you need?: No   Physical Activity: Not on file   Stress: Not  on file   Social Connections: Unknown (3/28/2024)    Social Connection and Isolation Panel [NHANES]     Frequency of Communication with Friends and Family: Not on file     Frequency of Social Gatherings with Friends and Family: More than three times a week     Attends Taoist Services: Not on file     Active Member of Clubs or Organizations: Not on file     Attends Club or Organization Meetings: Not on file     Marital Status: Not on file   Interpersonal Safety: Low Risk  (4/2/2024)    Interpersonal Safety     Do you feel physically and emotionally safe where you currently live?: Yes     Within the past 12 months, have you been hit, slapped, kicked or otherwise physically hurt by someone?: No     Within the past 12 months, have you been humiliated or emotionally abused in other ways by your partner or ex-partner?: No   Housing Stability: Low Risk  (3/28/2024)    Housing Stability     Do you have housing? : Yes     Are you worried about losing your housing?: No          Medications  Allergies   Scheduled Meds:  Current Outpatient Medications   Medication Sig Dispense Refill    acetaminophen (TYLENOL) 325 MG tablet [ACETAMINOPHEN (TYLENOL) 325 MG TABLET] Take 1 tablet (325 mg total) by mouth every 4 (four) hours as needed.  0    apixaban ANTICOAGULANT (ELIQUIS ANTICOAGULANT) 5 MG tablet Take 1 tablet (5 mg) by mouth every 12 hours. 180 tablet 3    atorvastatin (LIPITOR) 10 MG tablet TAKE 1 TABLET BY MOUTH AT BEDTIME 90 tablet 2    famotidine (PEPCID) 20 MG tablet [FAMOTIDINE (PEPCID) 20 MG TABLET] Take 1 tablet (20 mg total) by mouth daily. (for stomach acid)  0    lisinopril (ZESTRIL) 2.5 MG tablet Take 1 tablet (2.5 mg) by mouth 2 times daily 180 tablet 3    multivitamin Chew [MULTIVITAMIN CHEW] Chew 2 tablets daily.       neomycin-polymyxin-dexAMETHasone (MAXITROL) 3.5-89672-3.1 ophthalmic ointment Apply a tiny amount to both eyelids, twice a day, for 3 days at a time per course of treatment. 3.5 g 0     neomycin-polymyxin-dexAMETHasone (MAXITROL) 3.5-54261-7.1 SUSP ophthalmic susp Place 1-2 drops into both eyes daily as needed (use sparingly to upper eyelids as needed) 1 mL 0    propranolol (INDERAL) 10 MG tablet Take 1 tablet by mouth twice daily 180 tablet 1    sertraline (ZOLOFT) 50 MG tablet Take 1 tablet (50 mg) by mouth daily. 90 tablet 3    sotalol (BETAPACE) 80 MG tablet Take 1 tablet (80 mg) by mouth every 12 hours. 180 tablet 3    Allergies   Allergen Reactions    Essential Oils [External Allergen Needs Reconciliation - See Comment] Shortness Of Breath, Cough and Headache    Hydralazine Nausea and Vomiting and Headache     Possibly. 12 hrs of Nausea and headache after one 10 mg dose    Adhesive Tape-Silicones [Adhesive Tape] Unknown     Bandaids    Codeine Unknown    Darvon [Propoxyphene] Unknown    Latex Unknown    Monistat 1 (Tioconazole) [Tioconazole] Unknown    Neurontin [Gabapentin] Unknown    Oxycodone Unknown    Paxil [Paroxetine] Unknown    Vicodin [Hydrocodone-Acetaminophen] Unknown    Citalopram Anxiety    Paroxetine Anxiety         Lab Results    Chemistry/lipid CBC Cardiac Enzymes/BNP/TSH/INR   Lab Results   Component Value Date    CHOL 143 04/02/2024    HDL 52 04/02/2024    TRIG 134 04/02/2024    BUN 19.9 04/02/2024     04/02/2024    CO2 27 04/02/2024    Lab Results   Component Value Date    WBC 8.2 04/02/2024    HGB 14.0 04/02/2024    HCT 43.6 04/02/2024    MCV 95 04/02/2024     04/02/2024    @RESUFAST(BMP,CBC,BNP,TSH,  INR)@      35 minutes spent reviewing prior records (including documentation, laboratory studies, cardiac testing/imaging), history and physical exam, planning, and subsequent documentation.     The longitudinal plan of care for the diagnosis(es)/condition(s) as documented were addressed during this visit. Due to the added complexity in care, I will continue to support Abiola in the subsequent management and with ongoing continuity of care.     This note has been  dictated using voice recognition software. Any grammatical, typographical, or context distortions are unintentional and inherent to the software.    Adeline Gibbs CNP  Clinical Cardiac Electrophysiology  [facility]  Clinic and [phone]  Fax: [phone]  Electrophysiology Nurses: [phone]

## 2025-04-18 ENCOUNTER — TELEPHONE (OUTPATIENT)
Dept: CARDIOLOGY | Facility: CLINIC | Age: 80
End: 2025-04-18

## 2025-04-18 ENCOUNTER — HOSPITAL ENCOUNTER (EMERGENCY)
Facility: CLINIC | Age: 80
Discharge: HOME | End: 2025-04-18
Payer: COMMERCIAL

## 2025-04-18 VITALS
HEART RATE: 79 BPM | BODY MASS INDEX: 29.88 KG/M2 | WEIGHT: 175 LBS | HEIGHT: 64 IN | RESPIRATION RATE: 16 BRPM | OXYGEN SATURATION: 95 % | DIASTOLIC BLOOD PRESSURE: 70 MMHG | TEMPERATURE: 98 F | SYSTOLIC BLOOD PRESSURE: 132 MMHG

## 2025-04-18 DIAGNOSIS — R04.0 EPISTAXIS: ICD-10-CM

## 2025-04-18 PROCEDURE — 30901 CONTROL OF NOSEBLEED: CPT | Mod: LT

## 2025-04-18 PROCEDURE — 99284 EMERGENCY DEPT VISIT MOD MDM: CPT

## 2025-04-18 RX ORDER — OXYMETAZOLINE HYDROCHLORIDE 0.05 G/100ML
2 SPRAY NASAL PRN
Qty: 22 ML | Refills: 0 | Status: SHIPPED | OUTPATIENT
Start: 2025-04-18

## 2025-04-18 ASSESSMENT — COLUMBIA-SUICIDE SEVERITY RATING SCALE - C-SSRS
2. HAVE YOU ACTUALLY HAD ANY THOUGHTS OF KILLING YOURSELF IN THE PAST MONTH?: NO
1. IN THE PAST MONTH, HAVE YOU WISHED YOU WERE DEAD OR WISHED YOU COULD GO TO SLEEP AND NOT WAKE UP?: NO
6. HAVE YOU EVER DONE ANYTHING, STARTED TO DO ANYTHING, OR PREPARED TO DO ANYTHING TO END YOUR LIFE?: NO

## 2025-04-18 ASSESSMENT — ACTIVITIES OF DAILY LIVING (ADL): ADLS_ACUITY_SCORE: 41

## 2025-04-18 NOTE — TELEPHONE ENCOUNTER
Patient called wanting to inform FELIPA Hawley that patient is currently on her way to urgent care due to a nose bleed that is going to require cauterizing. Patient confirms that she is taking Eliquis 5mg BID and would like to know if this should be changed. Routed to FELIPA Lr for further recommendations.     Krystyna Hong RN   Device Nurse

## 2025-04-18 NOTE — ED TRIAGE NOTES
Patient presents to ED with L sided nosebleed that began @1500 today, patient gets frequent nosebleeds and had L sided cauterization on Monday, pt is on Eliquis, bleeding is controlled at this time with clamp.  Majo Shea RN.......4/18/2025 3:54 PM     Triage Assessment (Adult)       Row Name 04/18/25 1552          Triage Assessment    Airway WDL WDL        Respiratory WDL    Respiratory WDL WDL        Skin Circulation/Temperature WDL    Skin Circulation/Temperature WDL WDL        Cardiac WDL    Cardiac WDL WDL        Peripheral/Neurovascular WDL    Peripheral Neurovascular WDL WDL        Cognitive/Neuro/Behavioral WDL    Cognitive/Neuro/Behavioral WDL WDL

## 2025-04-18 NOTE — ED NOTES
Primary assessment done by provider. RN discussed discharge with pt - pt will use Afrin as needed and nose clamp. Pt will follow up with PMD.

## 2025-04-18 NOTE — DISCHARGE INSTRUCTIONS
You were seen in the emergency department for nosebleeds.  Your left nostril does have an area that looked erythematous and irritated that likely was the source of the bleeding.  I cauterized this which seems to have controlled the bleeding.  Follow-up with your primary doctor for discussion regarding your Eliquis and frequent nosebleeds.  Try not to blow your nose aggressively because this can cause a nosebleed.  It can be helpful to run a humidifier in your home because the nose can become really dry which can provoke nosebleeds.  If you do get a nosebleed at home, there is a medication known as Afrin which is a nasal spray which can help with nosebleeds.  You can do 2 puffs in the nostril that seems to be bleeding and then put the nasal clamp on.  Leave this in place for 15 to 20 minutes without touching it.  If it still bleeding, put the clamp back on and leave in place for another 15 to 20 minutes.  If you are unable to get the bleeding under control or you have blood down the back of your throat or lots of clots, get evaluated in the emergency department.  I also placed a referral for ENT.  It can be helpful to see them for recurrent nosebleeds.  They will call you to make the appointment.

## 2025-04-18 NOTE — ED PROVIDER NOTES
EMERGENCY DEPARTMENT ENCOUNTER      NAME: Abiola Hallman  AGE: 79 year old female  YOB: 1945  MRN: [id number]  EVALUATION DATE & TIME: No admission date for patient encounter.    PCP: Olvin Sarkar    ED PROVIDER: Savanah Enrique PA-C    CHIEF COMPLAINT  Epistaxis      FINAL IMPRESSION:      ICD-10-CM    1. Epistaxis  R04.0 Adult ENT  Referral          MEDICAL DECISION MAKING AND ED COURSE:  Pertinent Labs & Imaging studies reviewed (See chart for details)     Abiola Hallman is a 79 year old female with a pertinent history of CVA on eliquis who presents to this ED by walk in for evaluation of epistaxis which started at 1500 when she bent over to do some cleaning in her closet.  Vitals reviewed and unremarkable.  No hypotension, tachycardia.  On exam, patient is very well-appearing in no distress.  [place] in the left naris with some dried blood and a clamp in place.  When this was removed, patient has a small polyp about midway into the left naris and there is a very small area of erythema which likely I think was the source of the bleeding.  She has no blood in her oropharynx.  There is no blood clots.  No septal hematoma.  She is well-appearing.  Breathing unlabored.    I did discuss doing nothing and just watching the patient but she did want to have cautery done again as this worked well for her when she was seen here earlier this week.  She had been having nosebleed every single day prior to this and after the cauterization, had almost a week without one.  Cauterization was performed and patient tolerated well.  She was monitored in the department for 20 minutes and an ambulation challenge was performed without any rebleeding.  I did discuss Afrin, humidifier, ENT follow-up as well as following up with her PCP for discussion of her Eliquis and her frequent nosebleeds.  I did offer blood work here including INR, CBC to assess hemoglobin and platelets and patient declined.  She said  "she has no history of thrombocytopenia or anemia and is not feeling symptomatically anemic.  I think is reasonable.  She was discharged in stable condition with a prescription for Afrin and an ENT referral.  All questions answered.    MEDICATIONS GIVEN IN THE EMERGENCY:  Medications - No data to display    NEW PRESCRIPTIONS STARTED AT TODAY'S ER VISIT  Discharge Medication List as of 4/18/2025  5:47 PM        START taking these medications    Details   oxymetazoline (AFRIN) 0.05 % nasal spray Spray 0.2 mLs (2 sprays) into both nostrils as needed (nose bleed management)., Disp-22 mL, R-0, E-Prescribe           Discharge Medication List as of 4/18/2025  5:47 PM          =================================================================    HPI    Patient information was obtained from: patient   Use of : N/A     Abiola Hallman is a 79 year old female with a pertinent history of CVA on eliquis who presents to this ED by walk in for evaluation of epistaxis which started at 1500 when she bent over to do some cleaning in her closet.  Was unable to get the bleeding controlled at home so she presented to the emergency department.  She has been getting frequent nosebleeds over the past week.  Was actually seen in the ER on Monday and had cauterization of her left naris and had not had a nosebleed since until today.  Denies any blood down the back of her throat or large clots.  She is on Eliquis twice daily for history of a CVA.  Actually called her primary doctor to discuss adjustments with her Eliquis dosing and had not gotten a response yet.    Chart review from 4/14/2025 patient was seen in the ER for epistaxis.  At the time the bleeding had stopped. Had been holding her Eliquis and was instructed to continue taking that.  And was told to follow back up with primary.    PHYSICAL EXAM    /70   Pulse 79   Temp 98  F (36.7  C) (Temporal)   Resp 16   Ht 1.626 m (5' 4\")   Wt 79.4 kg (175 lb)   LMP  (LMP " Unknown)   SpO2 95%   BMI 30.04 kg/m    Physical Exam  Vitals and nursing note reviewed.   Constitutional:       General: She is not in acute distress.     Appearance: Normal appearance. She is not ill-appearing or toxic-appearing.   HENT:      Head: Normocephalic and atraumatic.      Nose: No septal deviation.      Left Nostril: Epistaxis present. No foreign body or septal hematoma.      Comments: Small polyp noted to the left nares about mid way into the canal. Small area just medial to this near the septum that is erythematous/irritated that seems to be dripping blood every few seconds.Patient otherwise well appearing. No septal hematoma.      Mouth/Throat:      Mouth: Mucous membranes are moist.      Comments: No blood in the oropharynx.   Cardiovascular:      Rate and Rhythm: Normal rate.   Pulmonary:      Effort: Pulmonary effort is normal.   Neurological:      General: No focal deficit present.      Mental Status: She is alert and oriented to person, place, and time. Mental status is at baseline.   Psychiatric:         Mood and Affect: Mood normal.            LAB:  All pertinent labs reviewed and interpreted.       RADIOLOGY:  Reviewed all pertinent imaging. Please see official radiology report.  No orders to display       Medical Decision Making  I reviewed the EMR: Outpatient Record: ER visit from 4/14/25  Discharge. I prescribed additional prescription strength medication(s) as charted. See documentation for any additional details.    MIPS (CTPE, Dental pain, [place], Sinusitis, Asthma/COPD, Head Trauma):     SEPSIS: None    PROCEDURE:  PROCEDURE: Epistaxis Management   INDICATIONS: Failure of epistaxis control with non-invasive management techniques.   PROCEDURE PROVIDER: Savanah Enrique PA-C   SITE: Left, Anterior   MEDICATION: None    NOTE: Anterior Source:  The area was evaluated and cleared with nasal suction to locate source of bleeding. The bleeding location was managed with limited chemical  cautery.  Following treatment the patient was observed and no significant bleeding was noted to recur.     COMPLICATIONS: Patient tolerated procedure well, without complication         Savanah Enrique PA-C  [facility] EMERGENCY ROOM  [address] [place] [id number]  [phone]        Savanah Enrique PA-C  04/20/25 0905

## 2025-04-23 ENCOUNTER — PATIENT OUTREACH (OUTPATIENT)
Dept: CARE COORDINATION | Facility: CLINIC | Age: 80
End: 2025-04-23
Payer: COMMERCIAL

## 2025-04-30 NOTE — TELEPHONE ENCOUNTER
Adeline Gibbs APRN CNP Yang, Youa N, RN  In the setting of rare breakthrough on sotalol per device interrogations and elevated [id number] score with prior TIA, recommend remaining on anticoagulation unless ongoing concerns outside of further ENT evaluation and management. Thank you

## 2025-05-03 ENCOUNTER — HEALTH MAINTENANCE LETTER (OUTPATIENT)
Age: 80
End: 2025-05-03

## 2025-05-08 ENCOUNTER — OFFICE VISIT (OUTPATIENT)
Dept: INTERNAL MEDICINE | Facility: CLINIC | Age: 80
End: 2025-05-08
Payer: COMMERCIAL

## 2025-05-08 VITALS
OXYGEN SATURATION: 98 % | SYSTOLIC BLOOD PRESSURE: 112 MMHG | HEIGHT: 64 IN | DIASTOLIC BLOOD PRESSURE: 68 MMHG | RESPIRATION RATE: 16 BRPM | BODY MASS INDEX: 31.92 KG/M2 | WEIGHT: 187 LBS | TEMPERATURE: 97.8 F | HEART RATE: 83 BPM

## 2025-05-08 DIAGNOSIS — R09.89 LABILE HYPERTENSION: Primary | ICD-10-CM

## 2025-05-08 DIAGNOSIS — E78.5 HYPERLIPIDEMIA LDL GOAL <70: ICD-10-CM

## 2025-05-08 DIAGNOSIS — Z79.01 CHRONIC ANTICOAGULATION: ICD-10-CM

## 2025-05-08 DIAGNOSIS — I48.0 PAF (PAROXYSMAL ATRIAL FIBRILLATION) (H): ICD-10-CM

## 2025-05-08 RX ORDER — LISINOPRIL 2.5 MG/1
2.5 TABLET ORAL 2 TIMES DAILY
Qty: 180 TABLET | Refills: 3 | Status: SHIPPED | OUTPATIENT
Start: 2025-05-08
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
## 2025-05-13 ENCOUNTER — TELEPHONE (OUTPATIENT)
Dept: INTERNAL MEDICINE | Facility: CLINIC | Age: 80
End: 2025-05-13
Payer: COMMERCIAL

## 2025-05-13 NOTE — LETTER
May 13, 2025    Abiola Hallman  [address] [place] [address]    Hello,     Your care team at [facility] values your health and well-being. After reviewing your chart, we have identified recommendation(s) to help you better manage your health.    It's time for your Medicare AWV. During your visit, we'll discuss your health, well-being, and any questions you may have related to preventive care. We'll also review any recommended tests, exams, or screenings you might need. To schedule please call [phone] ([phone]) or use your [employer] account.     If you recently had or are having any of these services soon, please contact the clinic via phone or [employer] so that your care team can update your records.    We look forward to seeing you at your upcoming visit.    If you have any questions or concerns, please contact our clinic. Thank you for continuing to trust us with your care.    Sincerely,    Your [facility] Care Team

## 2025-05-13 NOTE — TELEPHONE ENCOUNTER
Patient Quality Outreach    Patient is due for the following:   Physical Annual Wellness Visit    Action(s) Taken:   Patient to call back and schedule an AWV    Type of outreach:    Sent [employer] message.    Questions for provider review:    None         Joy C Steinert, [facility]  Chart routed to None.

## 2025-05-16 ENCOUNTER — TELEPHONE (OUTPATIENT)
Dept: CARDIOLOGY | Facility: CLINIC | Age: 80
End: 2025-05-16
Payer: COMMERCIAL

## 2025-05-16 ENCOUNTER — TELEPHONE (OUTPATIENT)
Dept: INTERNAL MEDICINE | Facility: CLINIC | Age: 80
End: 2025-05-16
Payer: COMMERCIAL

## 2025-05-16 DIAGNOSIS — M41.9 SCOLIOSIS OF LUMBOSACRAL SPINE, UNSPECIFIED SCOLIOSIS TYPE: Primary | ICD-10-CM

## 2025-05-16 DIAGNOSIS — M21.70 LEG LENGTH DIFFERENCE, ACQUIRED: ICD-10-CM

## 2025-05-16 NOTE — TELEPHONE ENCOUNTER
Order/Referral Request    Who is requesting: pt    Orders being requested: podiatry and physical therapy    Reason service is needed/diagnosis: scoliosis and gait     When are orders needed by: asap    Has this been discussed with Provider: Yes    Does patient have a preference on a Group/Provider/Facility? [facility]    Does patient have an appointment scheduled?: No    Where to send orders: Place orders within Epic    Could we send this information to you in Health system or would you prefer to receive a phone call?:   Patient would prefer a phone call   Okay to leave a detailed message?: Yes at Cell number on file:    Telephone Information:   Mobile [phone]

## 2025-05-16 NOTE — TELEPHONE ENCOUNTER
"LOV 5/8/25:  \"Scoliosis surgery age 53, Spine reconstruction, with rods in place, 9/1998   Her mobility seems pretty good.  I would encourage her to do core muscle strengthening, to help with gait stability.     Altered leg and foot mechanics since her scoliosis surgery,  She had a very productive visit with Dr. Alfaro on January 18, 2023 and received updated orthotics\"  "

## 2025-05-16 NOTE — TELEPHONE ENCOUNTER
Please tell Abiola that    Because of her history of scoliosis surgery, I think the [facility] multidisciplinary spine clinic would be best for her, and that includes physical therapy.    I also sent consultation request to orthopedic foot (podiatry)

## 2025-05-16 NOTE — TELEPHONE ENCOUNTER
5/16/25: Patient called because she wanted to tell SUSANNA Hawley FELIPA that she really doesn't want the Watchman procedure done, even though she has had nosebleeds with Eliquis that have required cauterization.    Will forward to SUSANNA Hawley FELIPA.  Ketty Garcia RN on 5/16/2025 at 3:02 PM

## 2025-05-19 ENCOUNTER — PATIENT OUTREACH (OUTPATIENT)
Dept: CARE COORDINATION | Facility: CLINIC | Age: 80
End: 2025-05-19
Payer: COMMERCIAL

## 2025-05-19 NOTE — TELEPHONE ENCOUNTER
Outreach #1. Call to patient with no answer. LMTCB.     TC: Please relay provider message.     Spine: If you don't hear from a representative within 2 business days, please call [phone].     Podiatry: [phone]

## 2025-05-20 NOTE — TELEPHONE ENCOUNTER
Outgoing call #2 to patient, No answer. LVMTCB.     When patient calls back, anyone can relay Dr. Sarkar's message about the referrals.      Adeline WILHELM RN

## 2025-05-21 ENCOUNTER — PATIENT OUTREACH (OUTPATIENT)
Dept: CARE COORDINATION | Facility: CLINIC | Age: 80
End: 2025-05-21
Payer: COMMERCIAL

## 2025-05-21 NOTE — TELEPHONE ENCOUNTER
Outgoing call to patient. Relayed PCP's detailed message. Pt states she already got toenail taken care of and has a nurse through her insurance coming out to her house to trim her toes. Pt states she's scheduled with spine clinic. No further questions/concerns at this time. Thank you.

## 2025-06-16 ENCOUNTER — THERAPY VISIT (OUTPATIENT)
Dept: PHYSICAL THERAPY | Facility: REHABILITATION | Age: 80
End: 2025-06-16
Payer: COMMERCIAL

## 2025-06-16 DIAGNOSIS — R26.9 ABNORMAL GAIT: Primary | ICD-10-CM

## 2025-06-16 DIAGNOSIS — R26.89 BALANCE PROBLEM: ICD-10-CM

## 2025-06-16 PROCEDURE — 97110 THERAPEUTIC EXERCISES: CPT | Mod: GP

## 2025-06-16 PROCEDURE — 97162 PT EVAL MOD COMPLEX 30 MIN: CPT | Mod: GP
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
## 2025-06-23 ENCOUNTER — THERAPY VISIT (OUTPATIENT)
Dept: PHYSICAL THERAPY | Facility: REHABILITATION | Age: 80
End: 2025-06-23
Payer: COMMERCIAL

## 2025-06-23 DIAGNOSIS — R26.9 ABNORMAL GAIT: Primary | ICD-10-CM

## 2025-06-23 DIAGNOSIS — R26.89 BALANCE PROBLEM: ICD-10-CM

## 2025-06-23 PROCEDURE — 97112 NEUROMUSCULAR REEDUCATION: CPT | Mod: GP | Performed by: PHYSICAL THERAPIST

## 2025-06-23 PROCEDURE — 97750 PHYSICAL PERFORMANCE TEST: CPT | Mod: GP | Performed by: PHYSICAL THERAPIST

## 2025-06-23 PROCEDURE — 97110 THERAPEUTIC EXERCISES: CPT | Mod: GP | Performed by: PHYSICAL THERAPIST

## 2025-06-25 DIAGNOSIS — G25.0 BENIGN ESSENTIAL TREMOR: ICD-10-CM

## 2025-06-25 RX ORDER — PROPRANOLOL HYDROCHLORIDE 10 MG/1
10 TABLET ORAL 2 TIMES DAILY
Qty: 180 TABLET | Refills: 0 | Status: SHIPPED | OUTPATIENT
Start: 2025-06-25

## 2025-07-08 ENCOUNTER — THERAPY VISIT (OUTPATIENT)
Dept: PHYSICAL THERAPY | Facility: REHABILITATION | Age: 80
End: 2025-07-08
Payer: COMMERCIAL

## 2025-07-08 DIAGNOSIS — R26.89 BALANCE PROBLEM: ICD-10-CM

## 2025-07-08 DIAGNOSIS — R26.9 ABNORMAL GAIT: Primary | ICD-10-CM

## 2025-07-08 PROCEDURE — 97110 THERAPEUTIC EXERCISES: CPT | Mod: GP | Performed by: PHYSICAL THERAPIST

## 2025-07-08 PROCEDURE — 97116 GAIT TRAINING THERAPY: CPT | Mod: GP | Performed by: PHYSICAL THERAPIST

## 2025-07-08 PROCEDURE — 97112 NEUROMUSCULAR REEDUCATION: CPT | Mod: GP | Performed by: PHYSICAL THERAPIST

## 2025-07-17 ENCOUNTER — THERAPY VISIT (OUTPATIENT)
Dept: PHYSICAL THERAPY | Facility: REHABILITATION | Age: 80
End: 2025-07-17
Payer: COMMERCIAL

## 2025-07-17 DIAGNOSIS — R26.9 ABNORMAL GAIT: Primary | ICD-10-CM

## 2025-07-17 DIAGNOSIS — R26.89 BALANCE PROBLEM: ICD-10-CM

## 2025-07-24 ENCOUNTER — ANCILLARY PROCEDURE (OUTPATIENT)
Dept: MAMMOGRAPHY | Facility: CLINIC | Age: 80
End: 2025-07-24
Attending: INTERNAL MEDICINE
Payer: COMMERCIAL

## 2025-07-24 DIAGNOSIS — Z12.31 VISIT FOR SCREENING MAMMOGRAM: ICD-10-CM

## 2025-07-24 PROCEDURE — 77063 BREAST TOMOSYNTHESIS BI: CPT | Mod: 52

## 2025-07-25 ENCOUNTER — ANCILLARY PROCEDURE (OUTPATIENT)
Dept: CARDIOLOGY | Facility: CLINIC | Age: 80
End: 2025-07-25
Attending: INTERNAL MEDICINE
Payer: COMMERCIAL

## 2025-07-25 DIAGNOSIS — Z95.0 CARDIAC PACEMAKER IN SITU: ICD-10-CM

## 2025-07-25 DIAGNOSIS — I49.5 SICK SINUS SYNDROME (H): ICD-10-CM

## 2025-07-25 LAB
MDC_IDC_LEAD_CONNECTION_STATUS: NORMAL
MDC_IDC_LEAD_CONNECTION_STATUS: NORMAL
MDC_IDC_LEAD_IMPLANT_DT: NORMAL
MDC_IDC_LEAD_IMPLANT_DT: NORMAL
MDC_IDC_LEAD_LOCATION: NORMAL
MDC_IDC_LEAD_LOCATION: NORMAL
MDC_IDC_LEAD_LOCATION_DETAIL_1: NORMAL
MDC_IDC_LEAD_LOCATION_DETAIL_1: NORMAL
MDC_IDC_LEAD_MFG: NORMAL
MDC_IDC_LEAD_MFG: NORMAL
MDC_IDC_LEAD_MODEL: NORMAL
MDC_IDC_LEAD_MODEL: NORMAL
MDC_IDC_LEAD_POLARITY_TYPE: NORMAL
MDC_IDC_LEAD_POLARITY_TYPE: NORMAL
MDC_IDC_LEAD_SERIAL: NORMAL
MDC_IDC_LEAD_SERIAL: NORMAL
MDC_IDC_LEAD_SPECIAL_FUNCTION: NORMAL
MDC_IDC_LEAD_SPECIAL_FUNCTION: NORMAL
MDC_IDC_MSMT_BATTERY_DTM: NORMAL
MDC_IDC_MSMT_BATTERY_REMAINING_LONGEVITY: 106 MO
MDC_IDC_MSMT_BATTERY_RRT_TRIGGER: 2.62
MDC_IDC_MSMT_BATTERY_STATUS: NORMAL
MDC_IDC_MSMT_BATTERY_VOLTAGE: 3 V
MDC_IDC_MSMT_LEADCHNL_RA_IMPEDANCE_VALUE: 361 OHM
MDC_IDC_MSMT_LEADCHNL_RA_IMPEDANCE_VALUE: 513 OHM
MDC_IDC_MSMT_LEADCHNL_RA_PACING_THRESHOLD_AMPLITUDE: 0.62 V
MDC_IDC_MSMT_LEADCHNL_RA_PACING_THRESHOLD_PULSEWIDTH: 0.4 MS
MDC_IDC_MSMT_LEADCHNL_RA_SENSING_INTR_AMPL: 2.62 MV
MDC_IDC_MSMT_LEADCHNL_RA_SENSING_INTR_AMPL: 2.62 MV
MDC_IDC_MSMT_LEADCHNL_RV_IMPEDANCE_VALUE: 361 OHM
MDC_IDC_MSMT_LEADCHNL_RV_IMPEDANCE_VALUE: 570 OHM
MDC_IDC_MSMT_LEADCHNL_RV_PACING_THRESHOLD_AMPLITUDE: 0.88 V
MDC_IDC_MSMT_LEADCHNL_RV_PACING_THRESHOLD_PULSEWIDTH: 0.4 MS
MDC_IDC_MSMT_LEADCHNL_RV_SENSING_INTR_AMPL: 9.38 MV
MDC_IDC_MSMT_LEADCHNL_RV_SENSING_INTR_AMPL: 9.38 MV
MDC_IDC_PG_IMPLANT_DTM: NORMAL
MDC_IDC_PG_MFG: NORMAL
MDC_IDC_PG_MODEL: NORMAL
MDC_IDC_PG_SERIAL: NORMAL
MDC_IDC_PG_TYPE: NORMAL
MDC_IDC_SESS_CLINIC_NAME: NORMAL
MDC_IDC_SESS_DTM: NORMAL
MDC_IDC_SESS_TYPE: NORMAL
MDC_IDC_SET_BRADY_AT_MODE_SWITCH_RATE: 171 {BEATS}/MIN
MDC_IDC_SET_BRADY_HYSTRATE: NORMAL
MDC_IDC_SET_BRADY_LOWRATE: 70 {BEATS}/MIN
MDC_IDC_SET_BRADY_MAX_SENSOR_RATE: 130 {BEATS}/MIN
MDC_IDC_SET_BRADY_MAX_TRACKING_RATE: 130 {BEATS}/MIN
MDC_IDC_SET_BRADY_MODE: NORMAL
MDC_IDC_SET_BRADY_PAV_DELAY_LOW: 180 MS
MDC_IDC_SET_BRADY_SAV_DELAY_LOW: 150 MS
MDC_IDC_SET_LEADCHNL_RA_PACING_AMPLITUDE: 1.5 V
MDC_IDC_SET_LEADCHNL_RA_PACING_ANODE_ELECTRODE_1: NORMAL
MDC_IDC_SET_LEADCHNL_RA_PACING_ANODE_LOCATION_1: NORMAL
MDC_IDC_SET_LEADCHNL_RA_PACING_CAPTURE_MODE: NORMAL
MDC_IDC_SET_LEADCHNL_RA_PACING_CATHODE_ELECTRODE_1: NORMAL
MDC_IDC_SET_LEADCHNL_RA_PACING_CATHODE_LOCATION_1: NORMAL
MDC_IDC_SET_LEADCHNL_RA_PACING_POLARITY: NORMAL
MDC_IDC_SET_LEADCHNL_RA_PACING_PULSEWIDTH: 0.4 MS
MDC_IDC_SET_LEADCHNL_RA_SENSING_ANODE_ELECTRODE_1: NORMAL
MDC_IDC_SET_LEADCHNL_RA_SENSING_ANODE_LOCATION_1: NORMAL
MDC_IDC_SET_LEADCHNL_RA_SENSING_CATHODE_ELECTRODE_1: NORMAL
MDC_IDC_SET_LEADCHNL_RA_SENSING_CATHODE_LOCATION_1: NORMAL
MDC_IDC_SET_LEADCHNL_RA_SENSING_POLARITY: NORMAL
MDC_IDC_SET_LEADCHNL_RA_SENSING_SENSITIVITY: 0.45 MV
MDC_IDC_SET_LEADCHNL_RV_PACING_AMPLITUDE: 1.5 V
MDC_IDC_SET_LEADCHNL_RV_PACING_ANODE_ELECTRODE_1: NORMAL
MDC_IDC_SET_LEADCHNL_RV_PACING_ANODE_LOCATION_1: NORMAL
MDC_IDC_SET_LEADCHNL_RV_PACING_CAPTURE_MODE: NORMAL
MDC_IDC_SET_LEADCHNL_RV_PACING_CATHODE_ELECTRODE_1: NORMAL
MDC_IDC_SET_LEADCHNL_RV_PACING_CATHODE_LOCATION_1: NORMAL
MDC_IDC_SET_LEADCHNL_RV_PACING_POLARITY: NORMAL
MDC_IDC_SET_LEADCHNL_RV_PACING_PULSEWIDTH: 0.4 MS
MDC_IDC_SET_LEADCHNL_RV_SENSING_ANODE_ELECTRODE_1: NORMAL
MDC_IDC_SET_LEADCHNL_RV_SENSING_ANODE_LOCATION_1: NORMAL
MDC_IDC_SET_LEADCHNL_RV_SENSING_CATHODE_ELECTRODE_1: NORMAL
MDC_IDC_SET_LEADCHNL_RV_SENSING_CATHODE_LOCATION_1: NORMAL
MDC_IDC_SET_LEADCHNL_RV_SENSING_POLARITY: NORMAL
MDC_IDC_SET_LEADCHNL_RV_SENSING_SENSITIVITY: 0.9 MV
MDC_IDC_SET_ZONE_DETECTION_INTERVAL: 200 MS
MDC_IDC_SET_ZONE_DETECTION_INTERVAL: 350 MS
MDC_IDC_SET_ZONE_DETECTION_INTERVAL: 400 MS
MDC_IDC_SET_ZONE_STATUS: NORMAL
MDC_IDC_SET_ZONE_TYPE: NORMAL
MDC_IDC_SET_ZONE_VENDOR_TYPE: NORMAL
MDC_IDC_STAT_AT_BURDEN_PERCENT: 0 %
MDC_IDC_STAT_AT_DTM_END: NORMAL
MDC_IDC_STAT_AT_DTM_START: NORMAL
MDC_IDC_STAT_BRADY_AP_VP_PERCENT: 0.03 %
MDC_IDC_STAT_BRADY_AP_VS_PERCENT: 95.25 %
MDC_IDC_STAT_BRADY_AS_VP_PERCENT: 0 %
MDC_IDC_STAT_BRADY_AS_VS_PERCENT: 4.71 %
MDC_IDC_STAT_BRADY_DTM_END: NORMAL
MDC_IDC_STAT_BRADY_DTM_START: NORMAL
MDC_IDC_STAT_BRADY_RA_PERCENT_PACED: 94.8 %
MDC_IDC_STAT_BRADY_RV_PERCENT_PACED: 0.04 %
MDC_IDC_STAT_EPISODE_RECENT_COUNT: 0
MDC_IDC_STAT_EPISODE_RECENT_COUNT_DTM_END: NORMAL
MDC_IDC_STAT_EPISODE_RECENT_COUNT_DTM_START: NORMAL
MDC_IDC_STAT_EPISODE_TOTAL_COUNT: 0
MDC_IDC_STAT_EPISODE_TOTAL_COUNT: 2
MDC_IDC_STAT_EPISODE_TOTAL_COUNT: 6
MDC_IDC_STAT_EPISODE_TOTAL_COUNT_DTM_END: NORMAL
MDC_IDC_STAT_EPISODE_TOTAL_COUNT_DTM_START: NORMAL
MDC_IDC_STAT_EPISODE_TYPE: NORMAL
MDC_IDC_STAT_TACHYTHERAPY_RECENT_DTM_END: NORMAL
MDC_IDC_STAT_TACHYTHERAPY_RECENT_DTM_START: NORMAL
MDC_IDC_STAT_TACHYTHERAPY_TOTAL_DTM_END: NORMAL
MDC_IDC_STAT_TACHYTHERAPY_TOTAL_DTM_START: NORMAL

## 2025-07-25 PROCEDURE — 93294 REM INTERROG EVL PM/LDLS PM: CPT | Performed by: INTERNAL MEDICINE

## 2025-07-25 PROCEDURE — 93296 REM INTERROG EVL PM/IDS: CPT | Performed by: INTERNAL MEDICINE

## 2025-08-14 ENCOUNTER — TRANSFERRED RECORDS (OUTPATIENT)
Dept: HEALTH INFORMATION MANAGEMENT | Facility: CLINIC | Age: 80
End: 2025-08-14
Payer: COMMERCIAL